# Patient Record
Sex: FEMALE | Race: WHITE | NOT HISPANIC OR LATINO | Employment: OTHER | URBAN - METROPOLITAN AREA
[De-identification: names, ages, dates, MRNs, and addresses within clinical notes are randomized per-mention and may not be internally consistent; named-entity substitution may affect disease eponyms.]

---

## 2023-10-09 ENCOUNTER — TELEPHONE (OUTPATIENT)
Dept: UROLOGY | Facility: AMBULATORY SURGERY CENTER | Age: 73
End: 2023-10-09

## 2023-10-09 NOTE — TELEPHONE ENCOUNTER
Complaint/Diagnosis:Recurrent UTI    Insurance:[facility] #[id number] [employer] [phone] Dayanara [place] spouse    History of cancer:bladder    Previous urologist:Dr.Leonard COLLINS [address] [address] d#[phone]    Outside Testing/where: [facility]    If yes,what kind:All/Any    Records requested/where:Pt will have transferred/faxed     Preferred location:[place]

## 2023-10-14 ENCOUNTER — APPOINTMENT (EMERGENCY)
Dept: CT IMAGING | Facility: HOSPITAL | Age: 73
End: 2023-10-14
Payer: MEDICARE

## 2023-10-14 ENCOUNTER — HOSPITAL ENCOUNTER (EMERGENCY)
Facility: HOSPITAL | Age: 73
Discharge: HOME | End: 2023-10-14
Attending: STUDENT IN AN ORGANIZED HEALTH CARE EDUCATION/TRAINING PROGRAM
Payer: MEDICARE

## 2023-10-14 ENCOUNTER — APPOINTMENT (EMERGENCY)
Dept: ULTRASOUND IMAGING | Facility: HOSPITAL | Age: 73
End: 2023-10-14
Payer: MEDICARE

## 2023-10-14 VITALS
SYSTOLIC BLOOD PRESSURE: 141 MMHG | HEART RATE: 71 BPM | BODY MASS INDEX: 34.31 KG/M2 | WEIGHT: 201 LBS | OXYGEN SATURATION: 98 % | HEIGHT: 64 IN | DIASTOLIC BLOOD PRESSURE: 87 MMHG | RESPIRATION RATE: 18 BRPM | TEMPERATURE: 97.6 F

## 2023-10-14 DIAGNOSIS — N39.0 UTI (URINARY TRACT INFECTION): Primary | ICD-10-CM

## 2023-10-14 DIAGNOSIS — R93.89 ENDOMETRIAL THICKENING ON ULTRASOUND: ICD-10-CM

## 2023-10-14 LAB
ALBUMIN SERPL BCP-MCNC: 4.4 G/DL (ref 3.5–5)
ALP SERPL-CCNC: 66 U/L (ref 34–104)
ALT SERPL W P-5'-P-CCNC: 15 U/L (ref 7–52)
ANION GAP SERPL CALCULATED.3IONS-SCNC: 8 MMOL/L
AST SERPL W P-5'-P-CCNC: 22 U/L (ref 13–39)
BACTERIA UR QL AUTO: ABNORMAL /HPF
BASOPHILS # BLD AUTO: 0.07 THOUSANDS/ÂΜL (ref 0–0.1)
BASOPHILS NFR BLD AUTO: 1 % (ref 0–1)
BILIRUB SERPL-MCNC: 0.83 MG/DL (ref 0.2–1)
BILIRUB UR QL STRIP: ABNORMAL
BUN SERPL-MCNC: 19 MG/DL (ref 5–25)
CALCIUM SERPL-MCNC: 9.2 MG/DL (ref 8.4–10.2)
CHLORIDE SERPL-SCNC: 102 MMOL/L (ref 96–108)
CLARITY UR: ABNORMAL
CO2 SERPL-SCNC: 27 MMOL/L (ref 21–32)
COLOR UR: YELLOW
CREAT SERPL-MCNC: 1.01 MG/DL (ref 0.6–1.3)
EOSINOPHIL # BLD AUTO: 0.11 THOUSAND/ÂΜL (ref 0–0.61)
EOSINOPHIL NFR BLD AUTO: 1 % (ref 0–6)
ERYTHROCYTE [DISTWIDTH] IN BLOOD BY AUTOMATED COUNT: 13.4 % (ref 11.6–15.1)
GFR SERPL CREATININE-BSD FRML MDRD: 56 ML/MIN/1.73SQ M
GLUCOSE SERPL-MCNC: 100 MG/DL (ref 65–140)
GLUCOSE UR STRIP-MCNC: NEGATIVE MG/DL
HCT VFR BLD AUTO: 43.8 % (ref 34.8–46.1)
HGB BLD-MCNC: 13.9 G/DL (ref 11.5–15.4)
HGB UR QL STRIP.AUTO: ABNORMAL
IMM GRANULOCYTES # BLD AUTO: 0.04 THOUSAND/UL (ref 0–0.2)
IMM GRANULOCYTES NFR BLD AUTO: 0 % (ref 0–2)
KETONES UR STRIP-MCNC: NEGATIVE MG/DL
LACTATE SERPL-SCNC: 1.5 MMOL/L (ref 0.5–2)
LEUKOCYTE ESTERASE UR QL STRIP: ABNORMAL
LYMPHOCYTES # BLD AUTO: 1.84 THOUSANDS/ÂΜL (ref 0.6–4.47)
LYMPHOCYTES NFR BLD AUTO: 21 % (ref 14–44)
MCH RBC QN AUTO: 32.3 PG (ref 26.8–34.3)
MCHC RBC AUTO-ENTMCNC: 31.7 G/DL (ref 31.4–37.4)
MCV RBC AUTO: 102 FL (ref 82–98)
MONOCYTES # BLD AUTO: 0.65 THOUSAND/ÂΜL (ref 0.17–1.22)
MONOCYTES NFR BLD AUTO: 7 % (ref 4–12)
NEUTROPHILS # BLD AUTO: 6.23 THOUSANDS/ÂΜL (ref 1.85–7.62)
NEUTS SEG NFR BLD AUTO: 70 % (ref 43–75)
NITRITE UR QL STRIP: NEGATIVE
NON-SQ EPI CELLS URNS QL MICRO: ABNORMAL /HPF
NRBC BLD AUTO-RTO: 0 /100 WBCS
PH UR STRIP.AUTO: 5.5 [PH]
PLATELET # BLD AUTO: 227 THOUSANDS/UL (ref 149–390)
PMV BLD AUTO: 8.7 FL (ref 8.9–12.7)
POTASSIUM SERPL-SCNC: 4.8 MMOL/L (ref 3.5–5.3)
PROT SERPL-MCNC: 7 G/DL (ref 6.4–8.4)
PROT UR STRIP-MCNC: ABNORMAL MG/DL
RBC # BLD AUTO: 4.3 MILLION/UL (ref 3.81–5.12)
RBC #/AREA URNS AUTO: ABNORMAL /HPF
SODIUM SERPL-SCNC: 137 MMOL/L (ref 135–147)
SP GR UR STRIP.AUTO: >=1.03 (ref 1–1.03)
UROBILINOGEN UR QL STRIP.AUTO: 0.2 E.U./DL
WBC # BLD AUTO: 8.94 THOUSAND/UL (ref 4.31–10.16)
WBC #/AREA URNS AUTO: ABNORMAL /HPF

## 2023-10-14 PROCEDURE — 99283 EMERGENCY DEPT VISIT LOW MDM: CPT

## 2023-10-14 PROCEDURE — 96374 THER/PROPH/DIAG INJ IV PUSH: CPT

## 2023-10-14 PROCEDURE — 87086 URINE CULTURE/COLONY COUNT: CPT | Performed by: PHYSICIAN ASSISTANT

## 2023-10-14 PROCEDURE — 83605 ASSAY OF LACTIC ACID: CPT | Performed by: PHYSICIAN ASSISTANT

## 2023-10-14 PROCEDURE — 74176 CT ABD & PELVIS W/O CONTRAST: CPT

## 2023-10-14 PROCEDURE — 76856 US EXAM PELVIC COMPLETE: CPT

## 2023-10-14 PROCEDURE — 85025 COMPLETE CBC W/AUTO DIFF WBC: CPT | Performed by: PHYSICIAN ASSISTANT

## 2023-10-14 PROCEDURE — 36415 COLL VENOUS BLD VENIPUNCTURE: CPT | Performed by: PHYSICIAN ASSISTANT

## 2023-10-14 PROCEDURE — 80053 COMPREHEN METABOLIC PANEL: CPT | Performed by: PHYSICIAN ASSISTANT

## 2023-10-14 PROCEDURE — 76830 TRANSVAGINAL US NON-OB: CPT

## 2023-10-14 PROCEDURE — 99285 EMERGENCY DEPT VISIT HI MDM: CPT | Performed by: PHYSICIAN ASSISTANT

## 2023-10-14 PROCEDURE — G1004 CDSM NDSC: HCPCS

## 2023-10-14 PROCEDURE — 81001 URINALYSIS AUTO W/SCOPE: CPT | Performed by: PHYSICIAN ASSISTANT

## 2023-10-14 RX ORDER — CIPROFLOXACIN 500 MG/1
500 TABLET, FILM COATED ORAL ONCE
Status: COMPLETED | OUTPATIENT
Start: 2023-10-14 | End: 2023-10-14

## 2023-10-14 RX ORDER — PHENAZOPYRIDINE HYDROCHLORIDE 100 MG/1
100 TABLET, FILM COATED ORAL ONCE
Status: COMPLETED | OUTPATIENT
Start: 2023-10-14 | End: 2023-10-14

## 2023-10-14 RX ORDER — CIPROFLOXACIN 500 MG/1
500 TABLET, FILM COATED ORAL 2 TIMES DAILY
Qty: 14 TABLET | Refills: 0 | Status: SHIPPED | OUTPATIENT
Start: 2023-10-14 | End: 2023-10-14 | Stop reason: SDUPTHER

## 2023-10-14 RX ORDER — CIPROFLOXACIN 500 MG/1
500 TABLET, FILM COATED ORAL 2 TIMES DAILY
Qty: 14 TABLET | Refills: 0 | Status: SHIPPED | OUTPATIENT
Start: 2023-10-14 | End: 2023-10-21

## 2023-10-14 RX ORDER — KETOROLAC TROMETHAMINE 30 MG/ML
15 INJECTION, SOLUTION INTRAMUSCULAR; INTRAVENOUS ONCE
Status: COMPLETED | OUTPATIENT
Start: 2023-10-14 | End: 2023-10-14

## 2023-10-14 RX ADMIN — PHENAZOPYRIDINE 100 MG: 100 TABLET ORAL at 12:27

## 2023-10-14 RX ADMIN — CIPROFLOXACIN HYDROCHLORIDE 500 MG: 500 TABLET, FILM COATED ORAL at 15:58

## 2023-10-14 RX ADMIN — KETOROLAC TROMETHAMINE 15 MG: 30 INJECTION, SOLUTION INTRAMUSCULAR at 13:40

## 2023-10-14 NOTE — DISCHARGE INSTRUCTIONS
These follow-up with gynecology for the ultrasound results which were concerning for endometrial thickening and possibly endometrial cancer. Please take new antibiotic as prescribed and follow-up with our urology department, a referral has been placed for you. Please return with any new or worsening symptoms  US pelvis complete w transvaginal   Final Result      Markedly irregular thickened endometrium likely representing endometrial carcinoma. Workstation performed: [id number]         CT renal stone study abdomen pelvis wo contrast   Final Result      No nephrolithiasis or hydronephroureter. Imaging findings concerning for acute cystitis. Apparent 4.3 cm low-attenuation lesion replacing the uterine body, which can represent endometrial or uterine neoplasm. Pelvic ultrasound is recommended for further assessment. Right basilar pulmonary groundglass attenuation, which can represent atelectasis or less likely, consolidation. Clinical correlation is recommended.          I personally discussed this study with Guillaume Baptiste on 10/14/2023 2:19 PM.                              Workstation performed: [id number]

## 2023-10-14 NOTE — ED PROVIDER NOTES
History  Chief Complaint   Patient presents with    Possible UTI     Patient reports UTI "forever". States she has been following with urology for over a year for recurrent UTIs. Discontinued antibiotics today. Sent to ED due to blood in urine. 72year old female presents to the ED for evaluation of UTI symptoms. Follows with Dr. Jorge See from [facility] due to recurrent UTI for the past year. States she typically on abx for about 10 days and symptoms clear and then in a couple weeks later symptoms return. Reports she is unhappy with care and has upcoming apt with Dr. Lisandro Holcomb in November. Patient also states she saw OBGYN yesterday for new patient visit. Currently on Keflex started on Tuesday without improvement of symptoms. This was prescribed by PCP who recommended stopping it today as she had had no improvement. She reports pressure, urge incontence, bilateral flank pain, dysuria. Reports microscopic hematuria but no grooss hematuria. Denies hx of kidney stones. No fevers or chills. Reports history of bladder cancer. None       Past Medical History:   Diagnosis Date    Recurrent UTI        History reviewed. No pertinent surgical history. History reviewed. No pertinent family history. I have reviewed and agree with the history as documented. E-Cigarette/Vaping    E-Cigarette Use Never User      E-Cigarette/Vaping Substances     Social History     Tobacco Use    Smoking status: Every Day     Packs/day: 1.00     Types: Cigarettes    Smokeless tobacco: Never   Vaping Use    Vaping Use: Never used   Substance Use Topics    Alcohol use: Not Currently       Review of Systems   Constitutional:  Negative for appetite change, fatigue and fever. Respiratory: Negative. Cardiovascular: Negative. Gastrointestinal:  Negative for abdominal pain. Genitourinary:  Positive for dysuria, frequency and urgency. Urge incontinence   Musculoskeletal:  Positive for back pain. Skin: Negative. Neurological: Negative. All other systems reviewed and are negative. Physical Exam  Physical Exam  Vitals and nursing note reviewed. Constitutional:       General: She is not in acute distress. Appearance: Normal appearance. She is not ill-appearing, toxic-appearing or diaphoretic. HENT:      Head: Normocephalic. Nose: Nose normal.   Eyes:      Conjunctiva/sclera: Conjunctivae normal.   Cardiovascular:      Rate and Rhythm: Normal rate and regular rhythm. Pulmonary:      Effort: Pulmonary effort is normal.      Breath sounds: Normal breath sounds. No stridor. No wheezing, rhonchi or rales. Abdominal:      General: Abdomen is flat. Bowel sounds are normal. There is no distension. Palpations: Abdomen is soft. Tenderness: There is no abdominal tenderness. There is no guarding. Musculoskeletal:         General: Normal range of motion. Skin:     General: Skin is warm and dry. Capillary Refill: Capillary refill takes less than 2 seconds. Findings: No erythema or rash. Neurological:      General: No focal deficit present. Mental Status: She is alert.    Psychiatric:         Mood and Affect: Mood normal.         Vital Signs  ED Triage Vitals [10/14/23 1157]   Temperature Pulse Respirations Blood Pressure SpO2   97.6 °F (36.4 °C) 71 18 141/87 98 %      Temp Source Heart Rate Source Patient Position - Orthostatic VS BP Location FiO2 (%)   Temporal Monitor Sitting Left arm --      Pain Score       4           Vitals:    10/14/23 1157   BP: 141/87   Pulse: 71   Patient Position - Orthostatic VS: Sitting         Visual Acuity      ED Medications  Medications   phenazopyridine (PYRIDIUM) tablet 100 mg (100 mg Oral Given 10/14/23 1227)   ketorolac (TORADOL) injection 15 mg (15 mg Intravenous Given 10/14/23 1340)   ciprofloxacin (CIPRO) tablet 500 mg (500 mg Oral Given 10/14/23 1558)       Diagnostic Studies  Results Reviewed       Procedure Component Value Units Date/Time Comprehensive metabolic panel [[id number]] Collected: 10/14/23 1227    Lab Status: Final result Specimen: Blood from Arm, Left Updated: 10/14/23 1310     Sodium 137 mmol/L      Potassium 4.8 mmol/L      Chloride 102 mmol/L      CO2 27 mmol/L      ANION GAP 8 mmol/L      BUN 19 mg/dL      Creatinine 1.01 mg/dL      Glucose 100 mg/dL      Calcium 9.2 mg/dL      AST 22 U/L      ALT 15 U/L      Alkaline Phosphatase 66 U/L      Total Protein 7.0 g/dL      Albumin 4.4 g/dL      Total Bilirubin 0.83 mg/dL      eGFR 56 ml/min/1.73sq m     Narrative:      [place] guidelines for Chronic Kidney Disease (CKD):     Stage 1 with normal or high GFR (GFR > 90 mL/min/1.73 square meters)    Stage 2 Mild CKD (GFR = 60-89 mL/min/1.73 square meters)    Stage 3A Moderate CKD (GFR = 45-59 mL/min/1.73 square meters)    Stage 3B Moderate CKD (GFR = 30-44 mL/min/1.73 square meters)    Stage 4 Severe CKD (GFR = 15-29 mL/min/1.73 square meters)    Stage 5 End Stage CKD (GFR <15 mL/min/1.73 square meters)  Note: GFR calculation is accurate only with a steady state creatinine    Lactic acid, plasma (w/reflex if result > 2.0) [[id number]]  (Normal) Collected: 10/14/23 1227    Lab Status: Final result Specimen: Blood from Arm, Left Updated: 10/14/23 1310     LACTIC ACID 1.5 mmol/L     Narrative:      Result may be elevated if tourniquet was used during collection. Urine Microscopic [[id number]]  (Abnormal) Collected: 10/14/23 1226    Lab Status: Final result Specimen: Urine, Clean Catch Updated: 10/14/23 1302     RBC, UA 4-10 /hpf      WBC, UA Innumerable /hpf      Epithelial Cells Moderate /hpf      Bacteria, UA Innumerable /hpf     Urine culture [[id number]] Collected: 10/14/23 1226    Lab Status:  In process Specimen: Urine, Clean Catch Updated: 10/14/23 1302    UA w Reflex to Microscopic w Reflex to Culture [[id number]]  (Abnormal) Collected: 10/14/23 1226    Lab Status: Final result Specimen: Urine, Clean Catch Updated: 10/14/23 1249     Color, UA Yellow     Clarity, UA Cloudy     Specific Gravity, UA >=1.030     pH, UA 5.5     Leukocytes, UA Large     Nitrite, UA Negative     Protein,  (2+) mg/dl      Glucose, UA Negative mg/dl      Ketones, UA Negative mg/dl      Urobilinogen, UA 0.2 E.U./dl      Bilirubin, UA Small     Occult Blood, UA Moderate    CBC and differential [[id number]]  (Abnormal) Collected: 10/14/23 1227    Lab Status: Final result Specimen: Blood from Arm, Left Updated: 10/14/23 1246     WBC 8.94 Thousand/uL      RBC 4.30 Million/uL      Hemoglobin 13.9 g/dL      Hematocrit 43.8 %       fL      MCH 32.3 pg      MCHC 31.7 g/dL      RDW 13.4 %      MPV 8.7 fL      Platelets 287 Thousands/uL      nRBC 0 /100 WBCs      Neutrophils Relative 70 %      Immat GRANS % 0 %      Lymphocytes Relative 21 %      Monocytes Relative 7 %      Eosinophils Relative 1 %      Basophils Relative 1 %      Neutrophils Absolute 6.23 Thousands/µL      Immature Grans Absolute 0.04 Thousand/uL      Lymphocytes Absolute 1.84 Thousands/µL      Monocytes Absolute 0.65 Thousand/µL      Eosinophils Absolute 0.11 Thousand/µL      Basophils Absolute 0.07 Thousands/µL                    US pelvis complete w transvaginal   Final Result by Lazara Le MD (10/14 1331)      Markedly irregular thickened endometrium likely representing endometrial carcinoma. Workstation performed: [id number]         CT renal stone study abdomen pelvis wo contrast   Final Result by Marlene Reynoso MD (10/14 1421)      No nephrolithiasis or hydronephroureter. Imaging findings concerning for acute cystitis. Apparent 4.3 cm low-attenuation lesion replacing the uterine body, which can represent endometrial or uterine neoplasm. Pelvic ultrasound is recommended for further assessment. Right basilar pulmonary groundglass attenuation, which can represent atelectasis or less likely, consolidation.  Clinical correlation is recommended. I personally discussed this study with Frankiliamindy Hardy on 10/14/2023 2:19 PM.                              Workstation performed: [id number]                    Procedures  Procedures         ED Course  ED Course as of 10/14/23 1847   Sat Oct 14, 2023   1259 WBC: 8.94   1259 Leukocytes, UA(!): Large   1259 Blood, UA(!): Moderate   1303 WBC, UA(!): Innumerable   1303 UA consistent with UTI   1318 Creatinine: 1.01   1319 LACTIC ACID: 1.5   1432 CT renal: No nephrolithiasis or hydronephroureter. Imaging findings concerning for acute cystitis. Apparent 4.3 cm low-attenuation lesion replacing the uterine body, which can represent endometrial or uterine neoplasm. Pelvic ultrasound is recommended for further assessment. Right basilar pulmonary groundglass attenuation, which can represent atelectasis or less likely, consolidation. Clinical correlation is recommended. 1435 I discussed results and findings with the patient thus far. Patient resting comfortably. Agreeable to pelvic ultrasound. [address]: Markedly irregular thickened endometrium likely representing endometrial carcinoma. 1555 In-depth discussion regarding results and findings with the patient and spouse. She was provided with copy of test results. She will follow-up with her gynecologist whom she saw yesterday. She will also follow-up with our urology department. Discussed return precautions and follow-up and patient verbalized understanding. She was clinically and hemodynamically stable for discharge                               SBIRT 22yo+      Flowsheet Row Most Recent Value   Initial Alcohol Screen: US AUDIT-C     1. How often do you have a drink containing alcohol? 0 Filed at: 10/14/2023 1202   2. How many drinks containing alcohol do you have on a typical day you are drinking? 0 Filed at: 10/14/2023 1202   3b. FEMALE Any Age, or MALE 65+:  How often do you have 4 or more drinks on one occassion? 0 Filed at: 10/14/2023 1202   Audit-C Score 0 Filed at: 10/14/2023 1202   COURTNEY: How many times in the past year have you. .. Used an illegal drug or used a prescription medication for non-medical reasons? Never Filed at: 10/14/2023 1202                      Medical Decision Making  82-year-old female presented to the emergency department for evaluation of UTI symptoms. Vitals and medical record reviewed. Patient at risk for the following but not limited to: UTI, pyelonephritis, renal stone. Patient was found to have UTI, CAT scan was consistent with cystitis also noted thickened endometrium and recommended dedicated ultrasound. Ultrasound concerning for irregularity in the endometrium likely for endometrial carcinoma. Discussed this with the patient. She will follow-up with her gynecologist.  Also have patient follow-up with urology. She was started on Cipro. We discussed tricked return precautions and patient verbalized understanding. She was clinically and hemodynamically stable for discharge    Amount and/or Complexity of Data Reviewed  Labs: ordered. Decision-making details documented in ED Course. Radiology: ordered. Risk  Prescription drug management. Disposition  Final diagnoses:   UTI (urinary tract infection)   Endometrial thickening on ultrasound     Time reflects when diagnosis was documented in both MDM as applicable and the Disposition within this note       Time User Action Codes Description Comment    10/14/2023  3:55 PM [email or web address] Purchase Add [N39.0] UTI (urinary tract infection)     10/14/2023  3:57 PM Sigmund Purchase Add [R93.89] Endometrial thickening on ultrasound           ED Disposition       ED Disposition   Discharge    Condition   Stable    Date/Time   Sat Oct 14, 2023 [address] discharge to home/self care.                    Follow-up Information       Follow up With Specialties Details Why [address], DO Internal Medicine [place] [id number]  [phone]              Discharge Medication List as of 10/14/2023  3:57 PM        START taking these medications    Details   ciprofloxacin (CIPRO) 500 mg tablet Take 1 tablet (500 mg total) by mouth 2 (two) times a day for 7 days, Starting Sat 10/14/2023, Until Sat 10/21/2023, Normal                 PDMP Review       None            ED Provider  Electronically Signed by             Dallin Cohn PA-C  10/14/23 9130

## 2023-10-15 LAB — BACTERIA UR CULT: NORMAL

## 2023-10-16 NOTE — TELEPHONE ENCOUNTER
Spoke with Dr. Sofie Justice and patient can be seen sooner then 11/1/23 due to being seen in the ER. She should be seen after her antibiotics are finished on 10/21/23. Please see if there is a sooner appointment with our providers in the office.

## 2023-10-16 NOTE — TELEPHONE ENCOUNTER
Npt called stating she presented [facility] ED 10/14/23 please review records/results for sooner than previously scheduled 11/01/23 appointment and contact her directly.

## 2023-10-23 RX ORDER — METOPROLOL SUCCINATE 25 MG/1
1 TABLET, EXTENDED RELEASE ORAL DAILY
COMMUNITY
Start: 2023-08-14

## 2023-10-23 RX ORDER — TELMISARTAN AND HYDROCHLORTHIAZIDE 80; 25 MG/1; MG/1
1 TABLET ORAL DAILY
COMMUNITY

## 2023-10-23 RX ORDER — OMEPRAZOLE 20 MG/1
20 CAPSULE, DELAYED RELEASE ORAL DAILY
COMMUNITY
Start: 2023-08-15

## 2023-10-23 RX ORDER — POTASSIUM CHLORIDE 750 MG/1
10 TABLET, EXTENDED RELEASE ORAL DAILY
COMMUNITY

## 2023-10-23 RX ORDER — SULFAMETHOXAZOLE AND TRIMETHOPRIM 800; 160 MG/1; MG/1
1 TABLET ORAL 2 TIMES DAILY
COMMUNITY

## 2023-10-23 RX ORDER — OMEPRAZOLE 20 MG/1
20 TABLET, DELAYED RELEASE ORAL DAILY
COMMUNITY

## 2023-10-23 RX ORDER — ROSUVASTATIN CALCIUM 5 MG/1
5 TABLET, COATED ORAL DAILY
COMMUNITY
Start: 2023-05-15

## 2023-10-23 RX ORDER — PRAMIPEXOLE DIHYDROCHLORIDE 0.5 MG/1
1 TABLET ORAL 2 TIMES DAILY
COMMUNITY
Start: 2023-08-21

## 2023-10-23 RX ORDER — SERTRALINE HYDROCHLORIDE 100 MG/1
100 TABLET, FILM COATED ORAL DAILY
COMMUNITY

## 2023-10-23 NOTE — PROGRESS NOTES
UROLOGY PROGRESS NOTE         NAME: Cayla Terrazas  AGE: 72 y.o. SEX: female  : 1950   MRN: [id number]    DATE: 10/25/2023  TIME: 10:34 AM    Assessment and Plan     Bladder catheterization    Date/Time: 10/25/2023 10:00 AM    Performed by: Aicha Puga MD  Authorized by: Aicha Puga MD    Comments:      Patient was placed in dorsal thumb position and prepped draped usual sterile fashion. Patient was straight cathed for a volume of 10 cc. Grossly clear.  Impression:   1. Urinary frequency  -     POCT urine dip auto non-scope    2. Urinary urgency    3. Dysuria    4. UTI (urinary tract infection)  -     Ambulatory Referral to Urology  -     POCT urine dip auto non-scope    5. History of bladder cancer         Plan: Our plan set patient up for cystoscopy possible bladder biopsy bilateral retrograde pyelograms. 4 weeks of Gemtesa to see if it settles her overactive bladder symptoms. Send her urine for culture and cytology and treat if positive. She understands and agrees  Also going to get a prescription for Pyridium for the burning. Chief Complaint   No chief complaint on file. History of Present Illness     HPI: Cayla Terrazas is a 72y.o. year old female who presents with evaluation of recurrent UTIs. Patient was last seen by [facility] urology  On 2023 Dr. Farrah Toro. According to his note she had underwent a cystoscopy revealing erythemic changes but no other abnormality. She was treated with a 10-day course of Cipro. Urine culture grew out Serratia sensitive to Cipro. She was then supposed to start low-dose antibiotic Bactrim DS 1/2 tablet daily but she forgot to take it according to 's note. He was to begin on 2023 a cephalosporin 200 mg twice a day for 10 days pending cultures and then taking the Bactrim DS half tablet daily possibly indefinitely or for 6 months to 1 year the plan was to recheck her urine in 3 months.     Patient was in the ER 10/14/2023 CT stone protocol that showed normal upper tracts no hydro no stones. The bladder was thick-walled suggestive of acute cystitis. She also had an abnormal CT showing thickening endometrium and the ultrasound color irregular likely representing a uterine carcinoma. Which of course will require gynecological follow-up. Urine culture 10/14/2023 no growth. Ever she had lots of pyuria. Please note in the ER she was complaining of pressure urge incontinence flank pain dysuria. ? There was a history of bladder cancer. Patient states she wants to transfer care because she is having lots of dysuria urgency frequency urge incontinence. He states Dr. Judith Hooper diagnosed her with bladder cancer in 2017 she received BCG. My concern was she had a recent cystoscopy at [facility] and it was noted in the report that she had erythema but a biopsy was not recommended.       The following portions of the patient's history were reviewed and updated as appropriate: allergies, current medications, past family history, past medical history, past social history, past surgical history and problem list.  Past Medical History:   Diagnosis Date    Acute UTI 2014    Arthritis 2016    Bladder cancer ([address])     COPD (chronic obstructive pulmonary disease) ([address]) 2016    Dependence on nicotine from cigarettes 2016    Depression 2016    Dupuytren's disease of palm 2018    Dyspnea on exertion     GERD (gastroesophageal reflux disease)     Heart murmur     Hyperlipidemia     Hypertension     Hypokalemia 2016    Left ventricular hypertrophy     Mixed stress and urge urinary incontinence     Non-rheumatic mitral regurgitation 2023    Osteoarthritis 2016    left knee    Osteopenia     Panlobular emphysema ([address])     Recurrent UTI     RLS (restless legs syndrome) 2016    Sleep apnea     Sleep apnea 2023     Past Surgical History:   Procedure Laterality Date    CATARACT EXTRACTION Bilateral     CYSTOSCOPY      ILEOSTOMY      REPLACEMENT TOTAL KNEE BILATERAL TONSILLECTOMY       shoulder  Review of Systems     Const: Denies chills, fever and weight loss. CV: Denies chest pain. Resp: Denies SOB. GI: Denies abdominal pain, nausea and vomiting. : Denies symptoms other than stated above. Musculo: Denies back pain. Objective   /90 (BP Location: Left arm, Patient Position: Sitting, Cuff Size: Standard)   Pulse 81   Temp (!) 97.2 °F (36.2 °C)   Ht 5' 4" (1.626 m)   Wt 92.1 kg (203 lb)   SpO2 94%   BMI 34.84 kg/m²     Physical Exam  Const: Appears healthy and well developed. No signs of acute distress present. Resp: Respirations are regular and unlabored. CV: Rate is regular. Rhythm is regular. Abdomen: Abdomen is soft, nontender, and nondistended. Kidneys are not palpable. : She had a normal external genitalia exam.  Psych: Patient's attitude is cooperative.  Mood is normal. Affect is normal.    Current Medications     Current Outpatient Medications:     aspirin (ECOTRIN LOW STRENGTH) 81 mg EC tablet, Take 81 mg by mouth daily, Disp: , Rfl:     celecoxib (CeleBREX) 200 mg capsule, Take 1 capsule by mouth daily, Disp: , Rfl:     Fluticasone-Umeclidin-Vilant (TRELEGY ELLIPTA IN), Inhale, Disp: , Rfl:     Fructooligosaccharides (FOS PO), Take 1,600 mg by mouth, Disp: , Rfl:     metoprolol succinate (TOPROL-XL) 25 mg 24 hr tablet, Take 1 tablet by mouth daily, Disp: , Rfl:     omeprazole (PriLOSEC OTC) 20 MG tablet, Take 20 mg by mouth daily, Disp: , Rfl:     potassium chloride (K-DUR,KLOR-CON) 10 mEq tablet, Take 10 mEq by mouth daily, Disp: , Rfl:     pramipexole (MIRAPEX) 0.5 mg tablet, Take 1 tablet by mouth 2 (two) times a day, Disp: , Rfl:     rosuvastatin (CRESTOR) 5 mg tablet, Take 5 mg by mouth daily, Disp: , Rfl:     sertraline (ZOLOFT) 100 mg tablet, Take 100 mg by mouth daily, Disp: , Rfl:     telmisartan-hydrochlorothiazide (MICARDIS HCT) 80-25 MG per tablet, Take 1 tablet by mouth daily, Disp: , Rfl:     Souleymane Ellipta 200-62.5-25 MCG/ACT AEPB inhaler, Inhale 1 puff daily, Disp: , Rfl:     cefpodoxime (VANTIN) 200 mg tablet, Take 200 mg by mouth, Disp: , Rfl:     ciprofloxacin (CIPRO) 500 mg tablet, TAKE 1 TABLET BY MOUTH TWICE A DAY FOR 7 DAYS (Patient not taking: Reported on 10/25/2023), Disp: , Rfl:     CRANBERRY PO, Take 1,600 mg by mouth (Patient not taking: Reported on 10/25/2023), Disp: , Rfl:     ketorolac (ACULAR) 0.5 % ophthalmic solution, Start 3 days preop INSTILL ONE DROP INTO OPERATIVE EYE(S) FOUR TIMES DAILY. CONTINUE POSTOP FOR 4 WEEKS (Patient not taking: Reported on 10/25/2023), Disp: , Rfl:     omeprazole (PriLOSEC) 20 mg delayed release capsule, Take 20 mg by mouth daily (Patient not taking: Reported on 10/25/2023), Disp: , Rfl:     phenazopyridine (PYRIDIUM) 200 mg tablet, TAKE ONE TABLET BY MOUTH THREE TIMES DAILY AS NEEDED FOR BLADDER SPASMS.  (Patient not taking: Reported on 10/25/2023), Disp: , Rfl:     sulfamethoxazole-trimethoprim (BACTRIM DS) 800-160 mg per tablet, Take 1 tablet by mouth 2 (two) times a day (Patient not taking: Reported on 10/25/2023), Disp: , Rfl:         Jaycee Chavarria MD

## 2023-10-23 NOTE — H&P (VIEW-ONLY)
UROLOGY PROGRESS NOTE         NAME: Mary Greco  AGE: 72 y.o. SEX: female  : 1950   MRN: [id number]    DATE: 10/25/2023  TIME: 10:34 AM    Assessment and Plan     Bladder catheterization    Date/Time: 10/25/2023 10:00 AM    Performed by: Benoit Delacruz MD  Authorized by: Benoit Delacruz MD    Comments:      Patient was placed in dorsal thumb position and prepped draped usual sterile fashion. Patient was straight cathed for a volume of 10 cc. Grossly clear.  Impression:   1. Urinary frequency  -     POCT urine dip auto non-scope    2. Urinary urgency    3. Dysuria    4. UTI (urinary tract infection)  -     Ambulatory Referral to Urology  -     POCT urine dip auto non-scope    5. History of bladder cancer         Plan: Our plan set patient up for cystoscopy possible bladder biopsy bilateral retrograde pyelograms. 4 weeks of Gemtesa to see if it settles her overactive bladder symptoms. Send her urine for culture and cytology and treat if positive. She understands and agrees  Also going to get a prescription for Pyridium for the burning. Chief Complaint   No chief complaint on file. History of Present Illness     HPI: Mary Greco is a 72y.o. year old female who presents with evaluation of recurrent UTIs. Patient was last seen by [facility] urology  On 2023 Dr. Michael Garrett. According to his note she had underwent a cystoscopy revealing erythemic changes but no other abnormality. She was treated with a 10-day course of Cipro. Urine culture grew out Serratia sensitive to Cipro. She was then supposed to start low-dose antibiotic Bactrim DS 1/2 tablet daily but she forgot to take it according to 's note. He was to begin on 2023 a cephalosporin 200 mg twice a day for 10 days pending cultures and then taking the Bactrim DS half tablet daily possibly indefinitely or for 6 months to 1 year the plan was to recheck her urine in 3 months.     Patient was in the ER 10/14/2023 CT stone protocol that showed normal upper tracts no hydro no stones. The bladder was thick-walled suggestive of acute cystitis. She also had an abnormal CT showing thickening endometrium and the ultrasound color irregular likely representing a uterine carcinoma. Which of course will require gynecological follow-up. Urine culture 10/14/2023 no growth. Ever she had lots of pyuria. Please note in the ER she was complaining of pressure urge incontinence flank pain dysuria. ? There was a history of bladder cancer. Patient states she wants to transfer care because she is having lots of dysuria urgency frequency urge incontinence. He states Dr. Tomi Walsh diagnosed her with bladder cancer in 2017 she received BCG. My concern was she had a recent cystoscopy at [facility] and it was noted in the report that she had erythema but a biopsy was not recommended.       The following portions of the patient's history were reviewed and updated as appropriate: allergies, current medications, past family history, past medical history, past social history, past surgical history and problem list.  Past Medical History:   Diagnosis Date    Acute UTI 2014    Arthritis 2016    Bladder cancer ([address])     COPD (chronic obstructive pulmonary disease) ([address]) 2016    Dependence on nicotine from cigarettes 2016    Depression 2016    Dupuytren's disease of palm 2018    Dyspnea on exertion     GERD (gastroesophageal reflux disease)     Heart murmur     Hyperlipidemia     Hypertension     Hypokalemia 2016    Left ventricular hypertrophy     Mixed stress and urge urinary incontinence     Non-rheumatic mitral regurgitation 2023    Osteoarthritis 2016    left knee    Osteopenia     Panlobular emphysema ([address])     Recurrent UTI     RLS (restless legs syndrome) 2016    Sleep apnea     Sleep apnea 2023     Past Surgical History:   Procedure Laterality Date    CATARACT EXTRACTION Bilateral     CYSTOSCOPY      ILEOSTOMY      REPLACEMENT TOTAL KNEE BILATERAL TONSILLECTOMY       shoulder  Review of Systems     Const: Denies chills, fever and weight loss. CV: Denies chest pain. Resp: Denies SOB. GI: Denies abdominal pain, nausea and vomiting. : Denies symptoms other than stated above. Musculo: Denies back pain. Objective   /90 (BP Location: Left arm, Patient Position: Sitting, Cuff Size: Standard)   Pulse 81   Temp (!) 97.2 °F (36.2 °C)   Ht 5' 4" (1.626 m)   Wt 92.1 kg (203 lb)   SpO2 94%   BMI 34.84 kg/m²     Physical Exam  Const: Appears healthy and well developed. No signs of acute distress present. Resp: Respirations are regular and unlabored. CV: Rate is regular. Rhythm is regular. Abdomen: Abdomen is soft, nontender, and nondistended. Kidneys are not palpable. : She had a normal external genitalia exam.  Psych: Patient's attitude is cooperative.  Mood is normal. Affect is normal.    Current Medications     Current Outpatient Medications:     aspirin (ECOTRIN LOW STRENGTH) 81 mg EC tablet, Take 81 mg by mouth daily, Disp: , Rfl:     celecoxib (CeleBREX) 200 mg capsule, Take 1 capsule by mouth daily, Disp: , Rfl:     Fluticasone-Umeclidin-Vilant (TRELEGY ELLIPTA IN), Inhale, Disp: , Rfl:     Fructooligosaccharides (FOS PO), Take 1,600 mg by mouth, Disp: , Rfl:     metoprolol succinate (TOPROL-XL) 25 mg 24 hr tablet, Take 1 tablet by mouth daily, Disp: , Rfl:     omeprazole (PriLOSEC OTC) 20 MG tablet, Take 20 mg by mouth daily, Disp: , Rfl:     potassium chloride (K-DUR,KLOR-CON) 10 mEq tablet, Take 10 mEq by mouth daily, Disp: , Rfl:     pramipexole (MIRAPEX) 0.5 mg tablet, Take 1 tablet by mouth 2 (two) times a day, Disp: , Rfl:     rosuvastatin (CRESTOR) 5 mg tablet, Take 5 mg by mouth daily, Disp: , Rfl:     sertraline (ZOLOFT) 100 mg tablet, Take 100 mg by mouth daily, Disp: , Rfl:     telmisartan-hydrochlorothiazide (MICARDIS HCT) 80-25 MG per tablet, Take 1 tablet by mouth daily, Disp: , Rfl:     Souleymane Ellipta 200-62.5-25 MCG/ACT AEPB inhaler, Inhale 1 puff daily, Disp: , Rfl:     cefpodoxime (VANTIN) 200 mg tablet, Take 200 mg by mouth, Disp: , Rfl:     ciprofloxacin (CIPRO) 500 mg tablet, TAKE 1 TABLET BY MOUTH TWICE A DAY FOR 7 DAYS (Patient not taking: Reported on 10/25/2023), Disp: , Rfl:     CRANBERRY PO, Take 1,600 mg by mouth (Patient not taking: Reported on 10/25/2023), Disp: , Rfl:     ketorolac (ACULAR) 0.5 % ophthalmic solution, Start 3 days preop INSTILL ONE DROP INTO OPERATIVE EYE(S) FOUR TIMES DAILY. CONTINUE POSTOP FOR 4 WEEKS (Patient not taking: Reported on 10/25/2023), Disp: , Rfl:     omeprazole (PriLOSEC) 20 mg delayed release capsule, Take 20 mg by mouth daily (Patient not taking: Reported on 10/25/2023), Disp: , Rfl:     phenazopyridine (PYRIDIUM) 200 mg tablet, TAKE ONE TABLET BY MOUTH THREE TIMES DAILY AS NEEDED FOR BLADDER SPASMS.  (Patient not taking: Reported on 10/25/2023), Disp: , Rfl:     sulfamethoxazole-trimethoprim (BACTRIM DS) 800-160 mg per tablet, Take 1 tablet by mouth 2 (two) times a day (Patient not taking: Reported on 10/25/2023), Disp: , Rfl:         Benoit Delacruz MD

## 2023-10-25 ENCOUNTER — PREP FOR PROCEDURE (OUTPATIENT)
Dept: UROLOGY | Facility: CLINIC | Age: 73
End: 2023-10-25

## 2023-10-25 ENCOUNTER — OFFICE VISIT (OUTPATIENT)
Dept: UROLOGY | Facility: CLINIC | Age: 73
End: 2023-10-25
Payer: MEDICARE

## 2023-10-25 VITALS
HEART RATE: 81 BPM | HEIGHT: 64 IN | DIASTOLIC BLOOD PRESSURE: 90 MMHG | BODY MASS INDEX: 34.66 KG/M2 | OXYGEN SATURATION: 94 % | TEMPERATURE: 97.2 F | WEIGHT: 203 LBS | SYSTOLIC BLOOD PRESSURE: 158 MMHG

## 2023-10-25 DIAGNOSIS — Z85.51 HISTORY OF BLADDER CANCER: ICD-10-CM

## 2023-10-25 DIAGNOSIS — R30.0 DYSURIA: ICD-10-CM

## 2023-10-25 DIAGNOSIS — Z01.818 ENCOUNTER FOR PREADMISSION TESTING: Primary | ICD-10-CM

## 2023-10-25 DIAGNOSIS — R35.0 URINARY FREQUENCY: Primary | ICD-10-CM

## 2023-10-25 DIAGNOSIS — C67.9 MALIGNANT NEOPLASM OF URINARY BLADDER, UNSPECIFIED SITE (HCC): ICD-10-CM

## 2023-10-25 DIAGNOSIS — R39.15 URINARY URGENCY: ICD-10-CM

## 2023-10-25 DIAGNOSIS — N39.0 UTI (URINARY TRACT INFECTION): ICD-10-CM

## 2023-10-25 LAB
SL AMB  POCT GLUCOSE, UA: ABNORMAL
SL AMB LEUKOCYTE ESTERASE,UA: ABNORMAL
SL AMB POCT BILIRUBIN,UA: ABNORMAL
SL AMB POCT BLOOD,UA: ABNORMAL
SL AMB POCT CLARITY,UA: CLEAR
SL AMB POCT COLOR,UA: YELLOW
SL AMB POCT KETONES,UA: ABNORMAL
SL AMB POCT NITRITE,UA: ABNORMAL
SL AMB POCT PH,UA: 6
SL AMB POCT SPECIFIC GRAVITY,UA: 1.02
SL AMB POCT URINE PROTEIN: ABNORMAL
SL AMB POCT UROBILINOGEN: 0.2

## 2023-10-25 PROCEDURE — 88112 CYTOPATH CELL ENHANCE TECH: CPT | Performed by: PATHOLOGY

## 2023-10-25 PROCEDURE — 81003 URINALYSIS AUTO W/O SCOPE: CPT | Performed by: UROLOGY

## 2023-10-25 PROCEDURE — 87086 URINE CULTURE/COLONY COUNT: CPT | Performed by: UROLOGY

## 2023-10-25 PROCEDURE — 99204 OFFICE O/P NEW MOD 45 MIN: CPT | Performed by: UROLOGY

## 2023-10-25 RX ORDER — CIPROFLOXACIN 500 MG/1
TABLET, FILM COATED ORAL
COMMUNITY
Start: 2023-10-14

## 2023-10-25 RX ORDER — PHENAZOPYRIDINE HYDROCHLORIDE 200 MG/1
TABLET, FILM COATED ORAL
COMMUNITY
Start: 2023-10-10

## 2023-10-25 RX ORDER — PHENAZOPYRIDINE HYDROCHLORIDE 200 MG/1
200 TABLET, FILM COATED ORAL
Qty: 30 TABLET | Refills: 0 | Status: SHIPPED | OUTPATIENT
Start: 2023-10-25

## 2023-10-26 LAB — BACTERIA UR CULT: NORMAL

## 2023-10-27 ENCOUNTER — TELEPHONE (OUTPATIENT)
Dept: UROLOGY | Facility: CLINIC | Age: 73
End: 2023-10-27

## 2023-10-27 PROCEDURE — 88112 CYTOPATH CELL ENHANCE TECH: CPT | Performed by: PATHOLOGY

## 2023-10-28 DIAGNOSIS — N39.0 URINARY TRACT INFECTION WITHOUT HEMATURIA, SITE UNSPECIFIED: Primary | ICD-10-CM

## 2023-10-28 RX ORDER — NITROFURANTOIN 25; 75 MG/1; MG/1
100 CAPSULE ORAL DAILY
Qty: 90 CAPSULE | Refills: 0 | Status: SHIPPED | OUTPATIENT
Start: 2023-10-28

## 2023-10-30 NOTE — TELEPHONE ENCOUNTER
VM left for pt and , asking to call back to let us know the message was received regarding needing to start macrobid.

## 2023-10-30 NOTE — TELEPHONE ENCOUNTER
Patient returning call    Patient informed of message from Dr. Kareen Manrique about needing to start macrobid and keeping appt to look in the bladder.

## 2023-11-03 ENCOUNTER — OFFICE VISIT (OUTPATIENT)
Dept: LAB | Facility: HOSPITAL | Age: 73
End: 2023-11-03
Payer: MEDICARE

## 2023-11-03 DIAGNOSIS — Z01.818 ENCOUNTER FOR PREADMISSION TESTING: ICD-10-CM

## 2023-11-03 LAB
ATRIAL RATE: 79 BPM
P AXIS: 40 DEGREES
PR INTERVAL: 170 MS
QRS AXIS: 38 DEGREES
QRSD INTERVAL: 96 MS
QT INTERVAL: 378 MS
QTC INTERVAL: 433 MS
T WAVE AXIS: 51 DEGREES
VENTRICULAR RATE: 79 BPM

## 2023-11-03 PROCEDURE — 93005 ELECTROCARDIOGRAM TRACING: CPT

## 2023-11-16 NOTE — PRE-PROCEDURE INSTRUCTIONS
Pre-Surgery Instructions:   Medication Instructions    aspirin (ECOTRIN LOW STRENGTH) 81 mg EC tablet Patient stopped it Wednesday    celecoxib (CeleBREX) 200 mg capsule Hold day of surgery. Fluticasone-Umeclidin-Vilant (TRELEGY ELLIPTA IN) Take day of surgery. metoprolol succinate (TOPROL-XL) 25 mg 24 hr tablet Take day of surgery. nitrofurantoin (MACROBID) 100 mg capsule Take day of surgery. omeprazole (PriLOSEC OTC) 20 MG tablet Take day of surgery. phenazopyridine (PYRIDIUM) 200 mg tablet Uses PRN- DO NOT take day of surgery    pramipexole (MIRAPEX) 0.5 mg tablet Take day of surgery. rosuvastatin (CRESTOR) 5 mg tablet Take night before surgery    sertraline (ZOLOFT) 100 mg tablet Take day of surgery. telmisartan-hydrochlorothiazide (MICARDIS HCT) 80-25 MG per tablet Hold day of surgery. Medication instructions for day surgery reviewed. Please use only a sip of water to take your instructed medications. Avoid all over the counter vitamins, supplements and NSAIDS for one week prior to surgery per anesthesia guidelines. Tylenol is ok to take as needed. You will receive a call one business day prior to surgery with an arrival time and hospital directions. If your surgery is scheduled on a Monday, the hospital will be calling you on the Friday prior to your surgery. If you have not heard from anyone by 8pm, please call the hospital supervisor through the hospital  at [phone]. [facility] [phone]). Do not eat or drink anything after midnight the night before your surgery, including candy, mints, lifesavers, or chewing gum. Do not drink alcohol 24hrs before your surgery. Try not to smoke at least 24hrs before your surgery. Follow the pre surgery showering instructions as listed in the [facility] Surgical Experience Booklet” or otherwise provided by your surgeon's office. Do not use a blade to shave the surgical area 1 week before surgery.  It is okay to use a clean electric clippers up to 24 hours before surgery. Do not apply any lotions, creams, including makeup, cologne, deodorant, or perfumes after showering on the day of your surgery. Do not use dry shampoo, hair spray, hair gel, or any type of hair products. No contact lenses, eye make-up, or artificial eyelashes. Remove nail polish, including gel polish, and any artificial, gel, or acrylic nails if possible. Remove all jewelry including rings and body piercing jewelry. Wear causal clothing that is easy to take on and off. Consider your type of surgery. Keep any valuables, jewelry, piercings at home. Please bring any specially ordered equipment (sling, braces) if indicated. Arrange for a responsible person to drive you to and from the hospital on the day of your surgery. Visitor Guidelines discussed. Call the surgeon's office with any new illnesses, exposures, or additional questions prior to surgery. Please reference your [facility] Surgical Experience Booklet” for additional information to prepare for your upcoming surgery.

## 2023-11-17 ENCOUNTER — APPOINTMENT (OUTPATIENT)
Dept: RADIOLOGY | Facility: HOSPITAL | Age: 73
End: 2023-11-17
Payer: MEDICARE

## 2023-11-17 ENCOUNTER — TELEPHONE (OUTPATIENT)
Dept: UROLOGY | Facility: CLINIC | Age: 73
End: 2023-11-17

## 2023-11-17 ENCOUNTER — ANESTHESIA EVENT (OUTPATIENT)
Dept: PERIOP | Facility: HOSPITAL | Age: 73
End: 2023-11-17
Payer: MEDICARE

## 2023-11-17 ENCOUNTER — HOSPITAL ENCOUNTER (OUTPATIENT)
Facility: HOSPITAL | Age: 73
Setting detail: OUTPATIENT SURGERY
Discharge: HOME | End: 2023-11-17
Attending: UROLOGY | Admitting: UROLOGY
Payer: MEDICARE

## 2023-11-17 ENCOUNTER — ANESTHESIA (OUTPATIENT)
Dept: PERIOP | Facility: HOSPITAL | Age: 73
End: 2023-11-17
Payer: MEDICARE

## 2023-11-17 VITALS
OXYGEN SATURATION: 92 % | TEMPERATURE: 97.8 F | RESPIRATION RATE: 23 BRPM | DIASTOLIC BLOOD PRESSURE: 75 MMHG | HEIGHT: 64 IN | BODY MASS INDEX: 34.15 KG/M2 | WEIGHT: 200 LBS | HEART RATE: 70 BPM | SYSTOLIC BLOOD PRESSURE: 153 MMHG

## 2023-11-17 DIAGNOSIS — Z85.51 HISTORY OF BLADDER CANCER: ICD-10-CM

## 2023-11-17 PROBLEM — Z99.89 CPAP (CONTINUOUS POSITIVE AIRWAY PRESSURE) DEPENDENCE: Status: ACTIVE | Noted: 2023-11-17

## 2023-11-17 PROBLEM — E78.5 HYPERLIPIDEMIA: Status: ACTIVE | Noted: 2023-11-17

## 2023-11-17 PROBLEM — I10 HYPERTENSION: Status: ACTIVE | Noted: 2023-11-17

## 2023-11-17 PROBLEM — K21.9 GERD (GASTROESOPHAGEAL REFLUX DISEASE): Status: ACTIVE | Noted: 2023-11-17

## 2023-11-17 PROCEDURE — C1769 GUIDE WIRE: HCPCS | Performed by: UROLOGY

## 2023-11-17 PROCEDURE — 74420 UROGRAPHY RTRGR +-KUB: CPT

## 2023-11-17 PROCEDURE — C1758 CATHETER, URETERAL: HCPCS | Performed by: UROLOGY

## 2023-11-17 PROCEDURE — 87086 URINE CULTURE/COLONY COUNT: CPT | Performed by: UROLOGY

## 2023-11-17 PROCEDURE — 99024 POSTOP FOLLOW-UP VISIT: CPT | Performed by: UROLOGY

## 2023-11-17 PROCEDURE — 52005 CYSTO W/URTRL CATHJ: CPT | Performed by: UROLOGY

## 2023-11-17 RX ORDER — SODIUM CHLORIDE, SODIUM LACTATE, POTASSIUM CHLORIDE, CALCIUM CHLORIDE 600; 310; 30; 20 MG/100ML; MG/100ML; MG/100ML; MG/100ML
125 INJECTION, SOLUTION INTRAVENOUS CONTINUOUS
Status: DISCONTINUED | OUTPATIENT
Start: 2023-11-17 | End: 2023-11-17 | Stop reason: HOSPADM

## 2023-11-17 RX ORDER — CEFAZOLIN SODIUM 2 G/50ML
2000 SOLUTION INTRAVENOUS ONCE
Status: COMPLETED | OUTPATIENT
Start: 2023-11-17 | End: 2023-11-17

## 2023-11-17 RX ORDER — FENTANYL CITRATE/PF 50 MCG/ML
25 SYRINGE (ML) INJECTION
Status: DISCONTINUED | OUTPATIENT
Start: 2023-11-17 | End: 2023-11-17 | Stop reason: HOSPADM

## 2023-11-17 RX ORDER — ALBUTEROL SULFATE 90 UG/1
AEROSOL, METERED RESPIRATORY (INHALATION) AS NEEDED
Status: DISCONTINUED | OUTPATIENT
Start: 2023-11-17 | End: 2023-11-17

## 2023-11-17 RX ORDER — SODIUM CHLORIDE, SODIUM LACTATE, POTASSIUM CHLORIDE, CALCIUM CHLORIDE 600; 310; 30; 20 MG/100ML; MG/100ML; MG/100ML; MG/100ML
INJECTION, SOLUTION INTRAVENOUS CONTINUOUS PRN
Status: DISCONTINUED | OUTPATIENT
Start: 2023-11-17 | End: 2023-11-17

## 2023-11-17 RX ORDER — EPHEDRINE SULFATE 50 MG/ML
INJECTION INTRAVENOUS AS NEEDED
Status: DISCONTINUED | OUTPATIENT
Start: 2023-11-17 | End: 2023-11-17

## 2023-11-17 RX ORDER — PROPOFOL 10 MG/ML
INJECTION, EMULSION INTRAVENOUS AS NEEDED
Status: DISCONTINUED | OUTPATIENT
Start: 2023-11-17 | End: 2023-11-17

## 2023-11-17 RX ORDER — ONDANSETRON 2 MG/ML
4 INJECTION INTRAMUSCULAR; INTRAVENOUS ONCE AS NEEDED
Status: DISCONTINUED | OUTPATIENT
Start: 2023-11-17 | End: 2023-11-17 | Stop reason: HOSPADM

## 2023-11-17 RX ORDER — GLYCOPYRROLATE 0.2 MG/ML
INJECTION INTRAMUSCULAR; INTRAVENOUS AS NEEDED
Status: DISCONTINUED | OUTPATIENT
Start: 2023-11-17 | End: 2023-11-17

## 2023-11-17 RX ORDER — MAGNESIUM HYDROXIDE 1200 MG/15ML
LIQUID ORAL AS NEEDED
Status: DISCONTINUED | OUTPATIENT
Start: 2023-11-17 | End: 2023-11-17 | Stop reason: HOSPADM

## 2023-11-17 RX ORDER — FENTANYL CITRATE 50 UG/ML
INJECTION, SOLUTION INTRAMUSCULAR; INTRAVENOUS AS NEEDED
Status: DISCONTINUED | OUTPATIENT
Start: 2023-11-17 | End: 2023-11-17

## 2023-11-17 RX ORDER — PHENAZOPYRIDINE HYDROCHLORIDE 100 MG/1
200 TABLET, FILM COATED ORAL ONCE
Status: COMPLETED | OUTPATIENT
Start: 2023-11-17 | End: 2023-11-17

## 2023-11-17 RX ORDER — DEXAMETHASONE SODIUM PHOSPHATE 10 MG/ML
INJECTION, SOLUTION INTRAMUSCULAR; INTRAVENOUS AS NEEDED
Status: DISCONTINUED | OUTPATIENT
Start: 2023-11-17 | End: 2023-11-17

## 2023-11-17 RX ORDER — ONDANSETRON 2 MG/ML
INJECTION INTRAMUSCULAR; INTRAVENOUS AS NEEDED
Status: DISCONTINUED | OUTPATIENT
Start: 2023-11-17 | End: 2023-11-17

## 2023-11-17 RX ADMIN — FENTANYL CITRATE 25 MCG: 0.05 INJECTION, SOLUTION INTRAMUSCULAR; INTRAVENOUS at 09:53

## 2023-11-17 RX ADMIN — DEXAMETHASONE SODIUM PHOSPHATE 8 MG: 10 INJECTION, SOLUTION INTRAMUSCULAR; INTRAVENOUS at 09:55

## 2023-11-17 RX ADMIN — EPHEDRINE SULFATE 5 MG: 50 INJECTION, SOLUTION INTRAVENOUS at 09:59

## 2023-11-17 RX ADMIN — FENTANYL CITRATE 25 MCG: 0.05 INJECTION, SOLUTION INTRAMUSCULAR; INTRAVENOUS at 09:43

## 2023-11-17 RX ADMIN — PHENAZOPYRIDINE 200 MG: 100 TABLET ORAL at 11:06

## 2023-11-17 RX ADMIN — SODIUM CHLORIDE, SODIUM LACTATE, POTASSIUM CHLORIDE, AND CALCIUM CHLORIDE: .6; .31; .03; .02 INJECTION, SOLUTION INTRAVENOUS at 09:35

## 2023-11-17 RX ADMIN — CEFAZOLIN SODIUM 2000 MG: 2 SOLUTION INTRAVENOUS at 09:43

## 2023-11-17 RX ADMIN — FENTANYL CITRATE 25 MCG: 0.05 INJECTION, SOLUTION INTRAMUSCULAR; INTRAVENOUS at 09:57

## 2023-11-17 RX ADMIN — PROPOFOL 160 MG: 10 INJECTION, EMULSION INTRAVENOUS at 09:48

## 2023-11-17 RX ADMIN — GLYCOPYRROLATE 0.2 MG: 0.2 INJECTION, SOLUTION INTRAMUSCULAR; INTRAVENOUS at 09:55

## 2023-11-17 RX ADMIN — ONDANSETRON 4 MG: 2 INJECTION INTRAMUSCULAR; INTRAVENOUS at 09:58

## 2023-11-17 RX ADMIN — FENTANYL CITRATE 25 MCG: 0.05 INJECTION, SOLUTION INTRAMUSCULAR; INTRAVENOUS at 10:02

## 2023-11-17 RX ADMIN — ALBUTEROL SULFATE 2 PUFF: 90 AEROSOL, METERED RESPIRATORY (INHALATION) at 09:46

## 2023-11-17 NOTE — ANESTHESIA POSTPROCEDURE EVALUATION
Post-Op Assessment Note    CV Status:  Stable    Pain management: adequate       Mental Status:  Alert and awake   Hydration Status:  Euvolemic   PONV Controlled:  Controlled   Airway Patency:  Patent     Post Op Vitals Reviewed: Yes    No anethesia notable event occurred.     Staff: CRNA               BP   113/79   Temp   97.8   Pulse  92   Resp   18   SpO2   98%

## 2023-11-17 NOTE — OP NOTE
OPERATIVE REPORT  PATIENT NAME: Theo Xavier    :  1950  MRN: [id number]  Pt Location: OW OR ROOM 02    SURGERY DATE: 2023    Surgeon(s) and Role:     * Yehuda Smiley MD - Primary    Preop Diagnosis:  History of bladder cancer [Z85.51]    Post-Op Diagnosis Codes:     * History of bladder cancer [Z85.51]    Procedure(s):  CYSTOSCOPY  bilateral retrograde pyelograms    Specimen(s):  ID Type Source Tests Collected by Time Destination   A :  Urine Urine, Straight Cath URINE CULTURE Yehuda Smiley MD 2023  9:57 AM        Estimated Blood Loss:   Minimal    Drains:  * No LDAs found *    Anesthesia Type:   General    Operative Indications:  History of bladder cancer [Z85.51]      Operative Findings:  Normal anterior and posterior urethra the bladder had some old scarring from her previous resection for bladder cancer at the dome. But there is no suspicious lesion present there was some mild trabeculation. She did have a bladder capacity of only 200 cc. Both retrograde pyelograms showed normal upper tracts. No filling defects no strictures no stones    Complications:   None    Procedure and Technique:  Patient was placed in dorsolithotomy position perineum and external tag were prepped draped you sterile fashion a urine culture was sent and then cystoscopy was carried out with a 30 and 70 degree lens with above finding noted. Retrograde pyelogram was carried out first on the right than the left using a tiger tail catheter with the above finding noted the bladder was emptied patient had procedure well   I was present for the entire procedure.     Patient Disposition:  hemodynamically stable        SIGNATURE: Yehuda Smiley MD  DATE: 2023  TIME: 10:05 AM

## 2023-11-17 NOTE — PROGRESS NOTES
Patient underwent a cystoscopy that showed no evidence of recurrent bladder cancer. Her urine looked clear culture was sent. Her upper tracts with bilateral retrogrades were normal.    The plan was to continue the 90-day course of Macrobid, she was complaining of preoperative burning so I recommended staying on Pyridium and watching her diet and take Prelief. We will see patient back in 4 months to reevaluate and would need a cystoscopy in 1 year for follow-up history of bladder cancer or sooner if she develops gross hematuria. She stopped taking the Gemtesa she said it helped her urgency frequency and did not request a refill.

## 2023-11-17 NOTE — DISCHARGE INSTR - AVS FIRST PAGE
Call the office fever greater than 102 increased bleeding or pain. Report to the ER for chest pain shortness of breath leg swelling or medical concerns. Recommend over-the-counter Prelief which helps neutralize acidic foods that cause burning in the bladder and urethral area. Also try to avoid spicy foods, caffeine products, and alcohol related products as well. Please take the Prelief twice a day. You can find this medication over-the-counter at a pharmacy or on [employer]. Follow-up with Dr. Jhoana Tucker to be arranged in about 4 months after you complete the 90 days of Macrobid. You could also take your Pyridium turns urine orange for some postop burning. May have some blood in the urine for the next day or 2 that should clear up.

## 2023-11-17 NOTE — ANESTHESIA PREPROCEDURE EVALUATION
Procedure:  CYSTOSCOPY with bladder biopsies and bilateral retrograde pyelograms (Bladder)  CYSTOSCOPY WITH RETROGRADE PYELOGRAM (Ureter)    Relevant Problems   CARDIO   (+) Hyperlipidemia   (+) Hypertension      GI/HEPATIC   (+) GERD (gastroesophageal reflux disease)      MUSCULOSKELETAL   (+) Arthritis      NEURO/PSYCH   (+) Depression      PULMONARY   (+) COPD (chronic obstructive pulmonary disease) (HCC)      Genitourinary   (+) Malignant neoplasm of urinary bladder, unspecified site (HCC)      Other   (+) History of bladder cancer     Normal sinus rhythm  Normal ECG  No previous ECGs available  Confirmed by Negro Tamez ([id number]) on 11/3/2023 1:30:00 PM     CAROTID DUPLEX (2020):  Right:   There is mild homogeneous plaquing noted throughout the right carotid artery. Doppler flow velocities are normal in the right carotid artery. There is no   evidence of a hemodynamically significant stenosis in the right internal   carotid artery. Flow in the right vertebral artery is antegrade. Left:   There is mild homogeneous plaquing noted throughout the left carotid artery. Doppler flow velocities are normal in the left carotid artery. There is no   evidence of a hemodynamically significant stenosis in the left internal   carotid artery. Physical Exam    Airway    Mallampati score:  I  TM Distance: <3 FB  Neck ROM: full     Dental   No notable dental hx     Cardiovascular  Rhythm: regular, Rate: normal    Pulmonary   Breath sounds clear to auscultation    Other Findings  post-pubertal. with the CRNA. Discussed and agreed on the Anesthesia Plan with the CRNA. Krishan Lacey

## 2023-11-17 NOTE — INTERVAL H&P NOTE
H&P reviewed. After examining the patient I find no changes in the patients condition since the H&P had been written.     Vitals:    11/17/23 0921   BP: 151/71   Pulse: 71   Resp: 18   Temp: 98.5 °F (36.9 °C)   SpO2: 95%

## 2023-11-17 NOTE — TELEPHONE ENCOUNTER
----- Message from Dewey Hernandez MD sent at 11/17/2023 10:21 AM EST -----  Follow-up in 4 months, for recurrent UTIs, overactive bladder, chronic dysuria

## 2023-11-18 LAB — BACTERIA UR CULT: NORMAL

## 2023-12-29 ENCOUNTER — NURSE TRIAGE (OUTPATIENT)
Age: 73
End: 2023-12-29

## 2023-12-29 DIAGNOSIS — R39.9 UTI SYMPTOMS: Primary | ICD-10-CM

## 2023-12-29 DIAGNOSIS — N39.0 URINARY TRACT INFECTION ASSOCIATED WITH CATHETERIZATION OF URINARY TRACT, UNSPECIFIED INDWELLING URINARY CATHETER TYPE, SEQUELA: Primary | ICD-10-CM

## 2023-12-29 DIAGNOSIS — T83.511S URINARY TRACT INFECTION ASSOCIATED WITH CATHETERIZATION OF URINARY TRACT, UNSPECIFIED INDWELLING URINARY CATHETER TYPE, SEQUELA: Primary | ICD-10-CM

## 2023-12-29 RX ORDER — CIPROFLOXACIN 500 MG/1
500 TABLET, FILM COATED ORAL EVERY 12 HOURS SCHEDULED
Qty: 14 TABLET | Refills: 0 | Status: SHIPPED | OUTPATIENT
Start: 2023-12-29 | End: 2024-01-05

## 2023-12-29 NOTE — TELEPHONE ENCOUNTER
"Pt of Dr. Saab in [place]. Last seen 11/17/23 had cystoscopy bilateral retorgrade pyelograms    Pt reports having incontinence, pressure and pain when she urinates for the past 1.5 weeks. Also some discharge. Denies fever or chills. Pt reports she reached out to GYN and they advised contact the office. Pt thinks she has an UTI. Advised patient I can place orders for urine testing. Pt reports she will go tot PCP office to get urine testing.   Reason for Disposition   Urinating more frequently than usual (i.e., frequency)    Answer Assessment - Initial Assessment Questions  1. SYMPTOM: \"What's the main symptom you're concerned about?\" (e.g., frequency, incontinence)      incontinence  2. ONSET: \"When did the  symptoms  start?\"      1.5 weeks ago  3. PAIN: \"Is there any pain?\" If Yes, ask: \"How bad is it?\" (Scale: 1-10; mild, moderate, severe)      mild  4. CAUSE: \"What do you think is causing the symptoms?\"      UTI  5. OTHER SYMPTOMS: \"Do you have any other symptoms?\" (e.g., fever, flank pain, blood in urine, pain with urination)      Pressure and discharge    Protocols used: Urinary Symptoms-ADULT-[place]    "

## 2023-12-29 NOTE — TELEPHONE ENCOUNTER
I phoned in a prescription for Cipro for a week 500 twice a day if the patient's symptoms do not improve tell her to call the office for an appointment

## 2024-03-01 ENCOUNTER — APPOINTMENT (INPATIENT)
Dept: ULTRASOUND IMAGING | Facility: HOSPITAL | Age: 74
DRG: 682 | End: 2024-03-01
Payer: MEDICARE

## 2024-03-01 ENCOUNTER — APPOINTMENT (EMERGENCY)
Dept: RADIOLOGY | Facility: HOSPITAL | Age: 74
DRG: 682 | End: 2024-03-01
Payer: MEDICARE

## 2024-03-01 ENCOUNTER — HOSPITAL ENCOUNTER (INPATIENT)
Facility: HOSPITAL | Age: 74
LOS: 5 days | Discharge: HOME | DRG: 682 | End: 2024-03-06
Attending: EMERGENCY MEDICINE | Admitting: FAMILY MEDICINE
Payer: MEDICARE

## 2024-03-01 DIAGNOSIS — J18.9 PNEUMONIA OF LEFT LOWER LOBE DUE TO INFECTIOUS ORGANISM: Primary | ICD-10-CM

## 2024-03-01 DIAGNOSIS — R09.02 HYPOXIA: ICD-10-CM

## 2024-03-01 DIAGNOSIS — J84.10 PULMONARY FIBROSIS (HCC): ICD-10-CM

## 2024-03-01 DIAGNOSIS — J44.1 COPD EXACERBATION (HCC): ICD-10-CM

## 2024-03-01 PROBLEM — J96.01 ACUTE RESPIRATORY FAILURE WITH HYPOXIA (HCC): Status: ACTIVE | Noted: 2024-03-01

## 2024-03-01 PROBLEM — N17.9 AKI (ACUTE KIDNEY INJURY) (HCC): Status: ACTIVE | Noted: 2024-03-01

## 2024-03-01 PROBLEM — Z85.51 HISTORY OF BLADDER CANCER: Status: RESOLVED | Noted: 2023-10-25 | Resolved: 2024-03-01

## 2024-03-01 LAB
2HR DELTA HS TROPONIN: -6 NG/L
ALBUMIN SERPL BCP-MCNC: 4.2 G/DL (ref 3.5–5)
ALP SERPL-CCNC: 58 U/L (ref 34–104)
ALT SERPL W P-5'-P-CCNC: 20 U/L (ref 7–52)
ANION GAP SERPL CALCULATED.3IONS-SCNC: 12 MMOL/L
AST SERPL W P-5'-P-CCNC: 28 U/L (ref 13–39)
ATRIAL RATE: 71 BPM
BACTERIA UR QL AUTO: ABNORMAL /HPF
BASE EX.OXY STD BLDV CALC-SCNC: 70.6 % (ref 60–80)
BASE EXCESS BLDV CALC-SCNC: 1.9 MMOL/L
BASOPHILS # BLD AUTO: 0.04 THOUSANDS/ÂΜL (ref 0–0.1)
BASOPHILS NFR BLD AUTO: 1 % (ref 0–1)
BILIRUB SERPL-MCNC: 0.55 MG/DL (ref 0.2–1)
BILIRUB UR QL STRIP: ABNORMAL
BNP SERPL-MCNC: 38 PG/ML (ref 0–100)
BUN SERPL-MCNC: 23 MG/DL (ref 5–25)
CALCIUM SERPL-MCNC: 9 MG/DL (ref 8.4–10.2)
CARDIAC TROPONIN I PNL SERPL HS: 21 NG/L
CARDIAC TROPONIN I PNL SERPL HS: 27 NG/L
CHLORIDE SERPL-SCNC: 97 MMOL/L (ref 96–108)
CLARITY UR: ABNORMAL
CO2 SERPL-SCNC: 27 MMOL/L (ref 21–32)
COLOR UR: ABNORMAL
CREAT SERPL-MCNC: 1.87 MG/DL (ref 0.6–1.3)
D DIMER PPP FEU-MCNC: 0.73 UG/ML FEU
EOSINOPHIL # BLD AUTO: 0.14 THOUSAND/ÂΜL (ref 0–0.61)
EOSINOPHIL NFR BLD AUTO: 2 % (ref 0–6)
ERYTHROCYTE [DISTWIDTH] IN BLOOD BY AUTOMATED COUNT: 13.4 % (ref 11.6–15.1)
FLUAV RNA RESP QL NAA+PROBE: NEGATIVE
FLUBV RNA RESP QL NAA+PROBE: NEGATIVE
GFR SERPL CREATININE-BSD FRML MDRD: 26 ML/MIN/1.73SQ M
GLUCOSE SERPL-MCNC: 109 MG/DL (ref 65–140)
GLUCOSE UR STRIP-MCNC: ABNORMAL MG/DL
HCO3 BLDV-SCNC: 28 MMOL/L (ref 24–30)
HCT VFR BLD AUTO: 45.5 % (ref 34.8–46.1)
HGB BLD-MCNC: 15 G/DL (ref 11.5–15.4)
HGB UR QL STRIP.AUTO: ABNORMAL
IMM GRANULOCYTES # BLD AUTO: 0.03 THOUSAND/UL (ref 0–0.2)
IMM GRANULOCYTES NFR BLD AUTO: 1 % (ref 0–2)
KETONES UR STRIP-MCNC: NEGATIVE MG/DL
LACTATE SERPL-SCNC: 1.1 MMOL/L (ref 0.5–2)
LEUKOCYTE ESTERASE UR QL STRIP: ABNORMAL
LYMPHOCYTES # BLD AUTO: 1.32 THOUSANDS/ÂΜL (ref 0.6–4.47)
LYMPHOCYTES NFR BLD AUTO: 21 % (ref 14–44)
MCH RBC QN AUTO: 30.9 PG (ref 26.8–34.3)
MCHC RBC AUTO-ENTMCNC: 33 G/DL (ref 31.4–37.4)
MCV RBC AUTO: 94 FL (ref 82–98)
MONOCYTES # BLD AUTO: 0.8 THOUSAND/ÂΜL (ref 0.17–1.22)
MONOCYTES NFR BLD AUTO: 13 % (ref 4–12)
NEUTROPHILS # BLD AUTO: 4.01 THOUSANDS/ÂΜL (ref 1.85–7.62)
NEUTS SEG NFR BLD AUTO: 62 % (ref 43–75)
NITRITE UR QL STRIP: POSITIVE
NON-SQ EPI CELLS URNS QL MICRO: ABNORMAL /HPF
NRBC BLD AUTO-RTO: 0 /100 WBCS
O2 CT BLDV-SCNC: 15.5 ML/DL
P AXIS: -21 DEGREES
PCO2 BLDV: 48.8 MM HG (ref 42–50)
PH BLDV: 7.38 [PH] (ref 7.3–7.4)
PH UR STRIP.AUTO: 5.5 [PH]
PLATELET # BLD AUTO: 161 THOUSANDS/UL (ref 149–390)
PMV BLD AUTO: 9 FL (ref 8.9–12.7)
PO2 BLDV: 40.5 MM HG (ref 35–45)
POTASSIUM SERPL-SCNC: 3.6 MMOL/L (ref 3.5–5.3)
PR INTERVAL: 152 MS
PROCALCITONIN SERPL-MCNC: 0.16 NG/ML
PROT SERPL-MCNC: 7.4 G/DL (ref 6.4–8.4)
PROT UR STRIP-MCNC: >=300 MG/DL
QRS AXIS: 1 DEGREES
QRSD INTERVAL: 88 MS
QT INTERVAL: 400 MS
QTC INTERVAL: 434 MS
RBC # BLD AUTO: 4.85 MILLION/UL (ref 3.81–5.12)
RBC #/AREA URNS AUTO: ABNORMAL /HPF
RSV RNA RESP QL NAA+PROBE: NEGATIVE
SARS-COV-2 RNA RESP QL NAA+PROBE: NEGATIVE
SODIUM SERPL-SCNC: 136 MMOL/L (ref 135–147)
SP GR UR STRIP.AUTO: 1.02 (ref 1–1.03)
T WAVE AXIS: 32 DEGREES
UROBILINOGEN UR QL STRIP.AUTO: 1 E.U./DL
VENTRICULAR RATE: 71 BPM
WBC # BLD AUTO: 6.34 THOUSAND/UL (ref 4.31–10.16)
WBC #/AREA URNS AUTO: ABNORMAL /HPF
WBC CLUMPS # UR AUTO: PRESENT /UL

## 2024-03-01 PROCEDURE — 80053 COMPREHEN METABOLIC PANEL: CPT | Performed by: EMERGENCY MEDICINE

## 2024-03-01 PROCEDURE — 99285 EMERGENCY DEPT VISIT HI MDM: CPT | Performed by: EMERGENCY MEDICINE

## 2024-03-01 PROCEDURE — 81001 URINALYSIS AUTO W/SCOPE: CPT | Performed by: FAMILY MEDICINE

## 2024-03-01 PROCEDURE — 93005 ELECTROCARDIOGRAM TRACING: CPT

## 2024-03-01 PROCEDURE — 76775 US EXAM ABDO BACK WALL LIM: CPT

## 2024-03-01 PROCEDURE — 0241U HB NFCT DS VIR RESP RNA 4 TRGT: CPT | Performed by: EMERGENCY MEDICINE

## 2024-03-01 PROCEDURE — 36415 COLL VENOUS BLD VENIPUNCTURE: CPT | Performed by: EMERGENCY MEDICINE

## 2024-03-01 PROCEDURE — 96375 TX/PRO/DX INJ NEW DRUG ADDON: CPT

## 2024-03-01 PROCEDURE — 87147 CULTURE TYPE IMMUNOLOGIC: CPT | Performed by: FAMILY MEDICINE

## 2024-03-01 PROCEDURE — 82805 BLOOD GASES W/O2 SATURATION: CPT | Performed by: EMERGENCY MEDICINE

## 2024-03-01 PROCEDURE — 87040 BLOOD CULTURE FOR BACTERIA: CPT | Performed by: EMERGENCY MEDICINE

## 2024-03-01 PROCEDURE — 71045 X-RAY EXAM CHEST 1 VIEW: CPT

## 2024-03-01 PROCEDURE — 94640 AIRWAY INHALATION TREATMENT: CPT

## 2024-03-01 PROCEDURE — 85379 FIBRIN DEGRADATION QUANT: CPT | Performed by: EMERGENCY MEDICINE

## 2024-03-01 PROCEDURE — 84484 ASSAY OF TROPONIN QUANT: CPT | Performed by: EMERGENCY MEDICINE

## 2024-03-01 PROCEDURE — 93010 ELECTROCARDIOGRAM REPORT: CPT | Performed by: INTERNAL MEDICINE

## 2024-03-01 PROCEDURE — 99223 1ST HOSP IP/OBS HIGH 75: CPT | Performed by: FAMILY MEDICINE

## 2024-03-01 PROCEDURE — 83605 ASSAY OF LACTIC ACID: CPT | Performed by: EMERGENCY MEDICINE

## 2024-03-01 PROCEDURE — 99285 EMERGENCY DEPT VISIT HI MDM: CPT

## 2024-03-01 PROCEDURE — 96365 THER/PROPH/DIAG IV INF INIT: CPT

## 2024-03-01 PROCEDURE — 87077 CULTURE AEROBIC IDENTIFY: CPT | Performed by: FAMILY MEDICINE

## 2024-03-01 PROCEDURE — 94760 N-INVAS EAR/PLS OXIMETRY 1: CPT

## 2024-03-01 PROCEDURE — 87086 URINE CULTURE/COLONY COUNT: CPT | Performed by: FAMILY MEDICINE

## 2024-03-01 PROCEDURE — 96367 TX/PROPH/DG ADDL SEQ IV INF: CPT

## 2024-03-01 PROCEDURE — 87449 NOS EACH ORGANISM AG IA: CPT | Performed by: FAMILY MEDICINE

## 2024-03-01 PROCEDURE — 83880 ASSAY OF NATRIURETIC PEPTIDE: CPT | Performed by: EMERGENCY MEDICINE

## 2024-03-01 PROCEDURE — 84484 ASSAY OF TROPONIN QUANT: CPT | Performed by: FAMILY MEDICINE

## 2024-03-01 PROCEDURE — 85025 COMPLETE CBC W/AUTO DIFF WBC: CPT | Performed by: EMERGENCY MEDICINE

## 2024-03-01 PROCEDURE — 84145 PROCALCITONIN (PCT): CPT | Performed by: FAMILY MEDICINE

## 2024-03-01 RX ORDER — ACETAMINOPHEN 325 MG/1
650 TABLET ORAL EVERY 6 HOURS PRN
Status: DISCONTINUED | OUTPATIENT
Start: 2024-03-01 | End: 2024-03-06 | Stop reason: HOSPADM

## 2024-03-01 RX ORDER — BUDESONIDE 0.5 MG/2ML
0.5 INHALANT ORAL
Status: DISCONTINUED | OUTPATIENT
Start: 2024-03-01 | End: 2024-03-06 | Stop reason: HOSPADM

## 2024-03-01 RX ORDER — CEFTRIAXONE 1 G/50ML
1000 INJECTION, SOLUTION INTRAVENOUS EVERY 24 HOURS
Status: DISCONTINUED | OUTPATIENT
Start: 2024-03-02 | End: 2024-03-05

## 2024-03-01 RX ORDER — METHYLPREDNISOLONE SODIUM SUCCINATE 40 MG/ML
40 INJECTION, POWDER, LYOPHILIZED, FOR SOLUTION INTRAMUSCULAR; INTRAVENOUS EVERY 12 HOURS SCHEDULED
Status: COMPLETED | OUTPATIENT
Start: 2024-03-02 | End: 2024-03-05

## 2024-03-01 RX ORDER — SERTRALINE HYDROCHLORIDE 100 MG/1
100 TABLET, FILM COATED ORAL DAILY
Status: DISCONTINUED | OUTPATIENT
Start: 2024-03-02 | End: 2024-03-06 | Stop reason: HOSPADM

## 2024-03-01 RX ORDER — IPRATROPIUM BROMIDE AND ALBUTEROL SULFATE 2.5; .5 MG/3ML; MG/3ML
3 SOLUTION RESPIRATORY (INHALATION) ONCE
Status: COMPLETED | OUTPATIENT
Start: 2024-03-01 | End: 2024-03-01

## 2024-03-01 RX ORDER — NITROFURANTOIN 25; 75 MG/1; MG/1
100 CAPSULE ORAL DAILY
Status: DISCONTINUED | OUTPATIENT
Start: 2024-03-01 | End: 2024-03-06 | Stop reason: HOSPADM

## 2024-03-01 RX ORDER — GUAIFENESIN 600 MG/1
600 TABLET, EXTENDED RELEASE ORAL 2 TIMES DAILY
Status: DISCONTINUED | OUTPATIENT
Start: 2024-03-01 | End: 2024-03-06 | Stop reason: HOSPADM

## 2024-03-01 RX ORDER — SODIUM CHLORIDE, SODIUM GLUCONATE, SODIUM ACETATE, POTASSIUM CHLORIDE, MAGNESIUM CHLORIDE, SODIUM PHOSPHATE, DIBASIC, AND POTASSIUM PHOSPHATE .53; .5; .37; .037; .03; .012; .00082 G/100ML; G/100ML; G/100ML; G/100ML; G/100ML; G/100ML; G/100ML
125 INJECTION, SOLUTION INTRAVENOUS CONTINUOUS
Status: DISCONTINUED | OUTPATIENT
Start: 2024-03-01 | End: 2024-03-03

## 2024-03-01 RX ORDER — CEFTRIAXONE 1 G/50ML
1000 INJECTION, SOLUTION INTRAVENOUS ONCE
Status: COMPLETED | OUTPATIENT
Start: 2024-03-01 | End: 2024-03-01

## 2024-03-01 RX ORDER — ALBUMIN (HUMAN) 12.5 G/50ML
50 SOLUTION INTRAVENOUS ONCE
Status: DISCONTINUED | OUTPATIENT
Start: 2024-03-01 | End: 2024-03-01

## 2024-03-01 RX ORDER — METHYLPREDNISOLONE SODIUM SUCCINATE 125 MG/2ML
80 INJECTION, POWDER, LYOPHILIZED, FOR SOLUTION INTRAMUSCULAR; INTRAVENOUS ONCE
Status: COMPLETED | OUTPATIENT
Start: 2024-03-01 | End: 2024-03-01

## 2024-03-01 RX ORDER — POLYETHYLENE GLYCOL 3350 17 G/17G
17 POWDER, FOR SOLUTION ORAL DAILY
Status: DISCONTINUED | OUTPATIENT
Start: 2024-03-02 | End: 2024-03-06 | Stop reason: HOSPADM

## 2024-03-01 RX ORDER — HEPARIN SODIUM 5000 [USP'U]/ML
5000 INJECTION, SOLUTION INTRAVENOUS; SUBCUTANEOUS EVERY 8 HOURS SCHEDULED
Status: DISCONTINUED | OUTPATIENT
Start: 2024-03-01 | End: 2024-03-06 | Stop reason: HOSPADM

## 2024-03-01 RX ORDER — PHENAZOPYRIDINE HYDROCHLORIDE 100 MG/1
100 TABLET, FILM COATED ORAL
Status: DISCONTINUED | OUTPATIENT
Start: 2024-03-01 | End: 2024-03-06 | Stop reason: HOSPADM

## 2024-03-01 RX ORDER — ONDANSETRON 2 MG/ML
4 INJECTION INTRAMUSCULAR; INTRAVENOUS EVERY 6 HOURS PRN
Status: DISCONTINUED | OUTPATIENT
Start: 2024-03-01 | End: 2024-03-06 | Stop reason: HOSPADM

## 2024-03-01 RX ORDER — ATORVASTATIN CALCIUM 40 MG/1
40 TABLET, FILM COATED ORAL
Status: DISCONTINUED | OUTPATIENT
Start: 2024-03-01 | End: 2024-03-06 | Stop reason: HOSPADM

## 2024-03-01 RX ORDER — NICOTINE 21 MG/24HR
1 PATCH, TRANSDERMAL 24 HOURS TRANSDERMAL DAILY
Status: DISCONTINUED | OUTPATIENT
Start: 2024-03-02 | End: 2024-03-06 | Stop reason: HOSPADM

## 2024-03-01 RX ORDER — PRAMIPEXOLE DIHYDROCHLORIDE 0.5 MG/1
0.5 TABLET ORAL 2 TIMES DAILY
Status: DISCONTINUED | OUTPATIENT
Start: 2024-03-01 | End: 2024-03-06 | Stop reason: HOSPADM

## 2024-03-01 RX ORDER — POTASSIUM CHLORIDE 750 MG/1
10 TABLET, EXTENDED RELEASE ORAL DAILY
Status: DISCONTINUED | OUTPATIENT
Start: 2024-03-02 | End: 2024-03-06 | Stop reason: HOSPADM

## 2024-03-01 RX ORDER — DOCUSATE SODIUM 100 MG/1
100 CAPSULE, LIQUID FILLED ORAL 2 TIMES DAILY
Status: DISCONTINUED | OUTPATIENT
Start: 2024-03-01 | End: 2024-03-06 | Stop reason: HOSPADM

## 2024-03-01 RX ORDER — PANTOPRAZOLE SODIUM 20 MG/1
20 TABLET, DELAYED RELEASE ORAL
Status: DISCONTINUED | OUTPATIENT
Start: 2024-03-02 | End: 2024-03-06 | Stop reason: HOSPADM

## 2024-03-01 RX ORDER — SODIUM CHLORIDE FOR INHALATION 0.9 %
3 VIAL, NEBULIZER (ML) INHALATION
Status: DISCONTINUED | OUTPATIENT
Start: 2024-03-01 | End: 2024-03-02

## 2024-03-01 RX ORDER — LEVALBUTEROL INHALATION SOLUTION 1.25 MG/3ML
1.25 SOLUTION RESPIRATORY (INHALATION)
Status: DISCONTINUED | OUTPATIENT
Start: 2024-03-01 | End: 2024-03-03

## 2024-03-01 RX ORDER — METOPROLOL SUCCINATE 25 MG/1
25 TABLET, EXTENDED RELEASE ORAL DAILY
Status: DISCONTINUED | OUTPATIENT
Start: 2024-03-02 | End: 2024-03-06 | Stop reason: HOSPADM

## 2024-03-01 RX ADMIN — CEFTRIAXONE 1000 MG: 1 INJECTION, SOLUTION INTRAVENOUS at 14:58

## 2024-03-01 RX ADMIN — PHENAZOPYRIDINE 100 MG: 100 TABLET ORAL at 16:38

## 2024-03-01 RX ADMIN — IPRATROPIUM BROMIDE 0.5 MG: 0.5 SOLUTION RESPIRATORY (INHALATION) at 21:26

## 2024-03-01 RX ADMIN — HEPARIN SODIUM 5000 UNITS: 5000 INJECTION INTRAVENOUS; SUBCUTANEOUS at 16:38

## 2024-03-01 RX ADMIN — LEVALBUTEROL HYDROCHLORIDE 1.25 MG: 1.25 SOLUTION RESPIRATORY (INHALATION) at 21:25

## 2024-03-01 RX ADMIN — BUDESONIDE 0.5 MG: 0.5 INHALANT ORAL at 21:25

## 2024-03-01 RX ADMIN — IPRATROPIUM BROMIDE AND ALBUTEROL SULFATE 3 ML: 2.5; .5 SOLUTION RESPIRATORY (INHALATION) at 14:53

## 2024-03-01 RX ADMIN — PRAMIPEXOLE DIHYDROCHLORIDE 0.5 MG: 0.5 TABLET ORAL at 17:51

## 2024-03-01 RX ADMIN — SODIUM CHLORIDE, SODIUM GLUCONATE, SODIUM ACETATE, POTASSIUM CHLORIDE, MAGNESIUM CHLORIDE, SODIUM PHOSPHATE, DIBASIC, AND POTASSIUM PHOSPHATE 125 ML/HR: .53; .5; .37; .037; .03; .012; .00082 INJECTION, SOLUTION INTRAVENOUS at 17:51

## 2024-03-01 RX ADMIN — HEPARIN SODIUM 5000 UNITS: 5000 INJECTION INTRAVENOUS; SUBCUTANEOUS at 23:08

## 2024-03-01 RX ADMIN — DOCUSATE SODIUM 100 MG: 100 CAPSULE, LIQUID FILLED ORAL at 17:51

## 2024-03-01 RX ADMIN — NITROFURANTOIN (MONOHYDRATE/MACROCRYSTALS) 100 MG: 75; 25 CAPSULE ORAL at 16:38

## 2024-03-01 RX ADMIN — GUAIFENESIN 600 MG: 600 TABLET ORAL at 17:51

## 2024-03-01 RX ADMIN — METHYLPREDNISOLONE SODIUM SUCCINATE 80 MG: 125 INJECTION, POWDER, FOR SOLUTION INTRAMUSCULAR; INTRAVENOUS at 14:54

## 2024-03-01 RX ADMIN — ATORVASTATIN CALCIUM 40 MG: 40 TABLET, FILM COATED ORAL at 16:38

## 2024-03-01 RX ADMIN — SODIUM CHLORIDE, SODIUM LACTATE, POTASSIUM CHLORIDE, AND CALCIUM CHLORIDE 1000 ML: .6; .31; .03; .02 INJECTION, SOLUTION INTRAVENOUS at 15:42

## 2024-03-01 NOTE — H&P
[facility]  [facility]  Name: Moriah Phillips 74 y.o. female I MRN: [id number]  Unit/Bed#: ED 01 I Date of Admission: 3/1/2024   Date of Service: 3/1/2024 I Hospital Day: 0      Assessment/Plan   PRESTON (acute kidney injury) (HCC)  Assessment & Plan  Creatinine is 1.87  Baseline creatinine ranges between 0.9-1.0  Patient does have history of bladder cancer  Avoid nephrotoxic medication, hypotension  Continue IV hydration, retention protocol, check ultrasound of kidney bladder    * Acute respiratory failure with hypoxia (HCC)  Assessment & Plan  Patient is saturating 86% on room air, now requiring 2 L of oxygen secondary to left lower lobe pneumonia/COPD exacerbation secondary to pneumonia  Continue supplemental oxygen, respiratory protocol, continue treatment for pneumonia and COPD  COVID/flu panel negative    Community acquired pneumonia of left lower lobe of lung  Assessment & Plan  Imaging shows right lower lobe pneumonia  Continue IV antibiotic, IV hydration, antiemetic, antipyretics  Respiratory protocol, follow blood culture, urine Legionella, pneumonia, procalcitonin  COVID/flu panel negative    CPAP (continuous positive airway pressure) dependence  Assessment & Plan  Continue CPAP.    Chronic obstructive pulmonary disease with acute lower respiratory infection (HCC)  Assessment & Plan  Aggravated secondary to pneumonia  Continue treatment for pneumonia, inhaled nebulizer, IV steroid, respiratory protocol    Malignant neoplasm of urinary bladder, unspecified site (HCC)  Assessment & Plan  Recently evaluated by [facility] urology, status post cystoscopy-did not reveal any evidence of recurrent bladder cancer.  Per urology, patient placed on Macrobid for recurrent UTI, continue           VTE Pharmacologic Prophylaxis: VTE Score: 7 High Risk (Score >/= 5) - Pharmacological DVT Prophylaxis Ordered: heparin. Sequential Compression Devices Ordered.  Code Status: Level 1 - Full Code per  patient  Discussion with family: Updated  () at bedside.    Anticipated Length of Stay: Patient will be admitted on an inpatient basis with an anticipated length of stay of greater than 2 midnights secondary to monitor above conditions.      Chief Complaint: Cough, shortness of breath, sore throat    History of Present Illness:  Moriah Phillips is a 74 y.o. female with a PMH of bladder cancer, COPD, sleep apnea who presents with cough, shortness of breath, sore throat with going on for last 3 days.  Denies any nausea, vomiting.  Reports anorexia and nausea.  Also complaining of dry mouth..    Review of Systems:  Review of Systems   Constitutional:  Positive for activity change, appetite change and fatigue. Negative for chills, diaphoresis, fever and unexpected weight change.   Respiratory:  Positive for cough, shortness of breath and wheezing. Negative for apnea.    Cardiovascular:  Negative for chest pain, palpitations and leg swelling.   Gastrointestinal:  Positive for nausea. Negative for abdominal distention, abdominal pain, blood in stool and vomiting.   Genitourinary:  Negative for difficulty urinating, dyspareunia, dysuria and enuresis.   Musculoskeletal:  Negative for arthralgias, back pain and gait problem.   Skin:  Negative for color change, pallor and rash.   Neurological:  Negative for dizziness, facial asymmetry and headaches.   Psychiatric/Behavioral:  Negative for agitation, behavioral problems and confusion.    All other systems reviewed and are negative.      Past Medical and Surgical History:   Past Medical History:   Diagnosis Date    Acute UTI 2014    Arthritis 2016    Bladder cancer (HCC)     COPD (chronic obstructive pulmonary disease) ([facility]) 2016    CPAP (continuous positive airway pressure) dependence     Dependence on nicotine from cigarettes 2016    Depression 2016    Dupuytren's disease of palm 2018    Dyspnea on exertion     GERD (gastroesophageal reflux disease)      Heart murmur     Hyperlipidemia     Hypertension     Hypokalemia 2016    Left ventricular hypertrophy     Mixed stress and urge urinary incontinence     Non-rheumatic mitral regurgitation 2023    Osteoarthritis 2016    left knee    Osteopenia     Panlobular emphysema (HCC)     Recurrent UTI     RLS (restless legs syndrome) 2016    Sleep apnea     Sleep apnea 2023       Past Surgical History:   Procedure Laterality Date    CATARACT EXTRACTION Bilateral     CYSTOSCOPY      ILEOSTOMY      MI CYSTOURETHROSCOPY N/A 11/17/2023    Procedure: CYSTOSCOPY  bilateral retrograde pyelograms;  Surgeon: Ryan Saab MD;  Location:  MAIN OR;  Service: Urology    REPLACEMENT TOTAL KNEE BILATERAL      TONSILLECTOMY         Meds/Allergies:  Prior to Admission medications    Medication Sig Start Date End Date Taking? Authorizing Provider   aspirin (ECOTRIN LOW STRENGTH) 81 mg EC tablet Take 81 mg by mouth daily    Historical Provider, MD   celecoxib (CeleBREX) 200 mg capsule Take 1 capsule by mouth daily 9/5/23   Historical Provider, MD   Fluticasone-Umeclidin-Vilant (TRELEGY ELLIPTA IN) Inhale    Historical Provider, MD   Fructooligosaccharides (FOS PO) Take 1,600 mg by mouth    Historical Provider, MD   metoprolol succinate (TOPROL-XL) 25 mg 24 hr tablet Take 1 tablet by mouth daily 8/14/23   Historical Provider, MD   nitrofurantoin (MACROBID) 100 mg capsule Take 1 capsule (100 mg total) by mouth in the morning 10/28/23   Ryan Saab MD   omeprazole (PriLOSEC OTC) 20 MG tablet Take 20 mg by mouth daily    Historical Provider, MD   phenazopyridine (PYRIDIUM) 200 mg tablet TAKE ONE TABLET BY MOUTH THREE TIMES DAILY AS NEEDED FOR BLADDER SPASMS. 10/10/23   Historical Provider, MD   potassium chloride (K-DUR,KLOR-CON) 10 mEq tablet Take 10 mEq by mouth daily    Historical Provider, MD   pramipexole (MIRAPEX) 0.5 mg tablet Take 1 tablet by mouth 2 (two) times a day 8/21/23   Historical Provider, MD   rosuvastatin (CRESTOR) 5 mg  tablet Take 5 mg by mouth daily 5/15/23   Historical Provider, MD   sertraline (ZOLOFT) 100 mg tablet Take 100 mg by mouth daily    Historical Provider, MD   telmisartan-hydrochlorothiazide (MICARDIS HCT) 80-25 MG per tablet Take 1 tablet by mouth daily    Historical Provider, MD [place]er Ellipta 200-62.5-25 MCG/ACT AEPB inhaler Inhale 1 puff daily 6/26/23   Historical Provider, MD   ketorolac (ACULAR) 0.5 % ophthalmic solution Start 3 days preop INSTILL ONE DROP INTO OPERATIVE EYE(S) FOUR TIMES DAILY. CONTINUE POSTOP FOR 4 WEEKS  Patient not taking: Reported on 10/25/2023 7/7/23 3/1/24  Historical Provider, MD   omeprazole (PriLOSEC) 20 mg delayed release capsule Take 20 mg by mouth daily  Patient not taking: Reported on 10/25/2023 8/15/23 3/1/24  Historical Provider, MD FINK have reviewed home medications with patient personally.    Allergies:   Allergies   Allergen Reactions    Penicillins Hives    Morphine GI Intolerance     n, v       Social History:  Marital Status: /Civil Union   Occupation: Unknown  Patient Pre-hospital Living Situation: Home  Patient Pre-hospital Level of Mobility: walks  Patient Pre-hospital Diet Restrictions: Cardiac diet  Substance Use History:   Social History     Substance and Sexual Activity   Alcohol Use Yes    Alcohol/week: 2.0 standard drinks of alcohol    Types: 2 Standard drinks or equivalent per week    Comment: Occasionally     Social History     Tobacco Use   Smoking Status Every Day    Current packs/day: 1.00    Average packs/day: 1 pack/day for 50.0 years (50.0 ttl pk-yrs)    Types: Cigarettes   Smokeless Tobacco Never     Social History     Substance and Sexual Activity   Drug Use Not Currently       Family History:  History reviewed. No pertinent family history.    Physical Exam:     Vitals:   Blood Pressure: 94/50 (03/01/24 1619)  Pulse: 62 (03/01/24 1619)  Temperature: 97.5 °F (36.4 °C) (03/01/24 1420)  Temp Source: Temporal (03/01/24 1420)  Respirations: 20  (03/01/24 1615)  SpO2: 90 % (03/01/24 1619)    Physical Exam  Vitals and nursing note reviewed.   Constitutional:       Appearance: Normal appearance. She is not ill-appearing or diaphoretic.   HENT:      Mouth/Throat:      Mouth: Mucous membranes are moist.      Pharynx: No oropharyngeal exudate or posterior oropharyngeal erythema.   Eyes:      General: No scleral icterus.        Left eye: No discharge.      Extraocular Movements: Extraocular movements intact.      Conjunctiva/sclera: Conjunctivae normal.      Pupils: Pupils are equal, round, and reactive to light.   Cardiovascular:      Rate and Rhythm: Normal rate.      Heart sounds:      No friction rub. No gallop.   Pulmonary:      Breath sounds: Wheezing and rhonchi present.      Comments: Tachypneic, increased breath sounds.  Chest:      Chest wall: No tenderness.   Abdominal:      General: Abdomen is flat. Bowel sounds are normal. There is no distension.      Palpations: There is no mass.      Tenderness: There is no abdominal tenderness.      Hernia: No hernia is present.   Musculoskeletal:      Right lower leg: No edema.      Left lower leg: No edema.   Skin:     General: Skin is warm.      Coloration: Skin is not jaundiced or pale.      Findings: No bruising.   Neurological:      General: No focal deficit present.      Mental Status: She is alert and oriented to person, place, and time.      Cranial Nerves: No cranial nerve deficit.      Sensory: No sensory deficit.      Motor: No weakness.      Coordination: Coordination normal.          Additional Data:     Lab Results:  Results from last 7 days   Lab Units 03/01/24  1446   WBC Thousand/uL 6.34   HEMOGLOBIN g/dL 15.0   HEMATOCRIT % 45.5   PLATELETS Thousands/uL 161   NEUTROS PCT % 62   LYMPHS PCT % 21   MONOS PCT % 13*   EOS PCT % 2     Results from last 7 days   Lab Units 03/01/24  1446   SODIUM mmol/L 136   POTASSIUM mmol/L 3.6   CHLORIDE mmol/L 97   CO2 mmol/L 27   BUN mg/dL 23   CREATININE mg/dL  1.87*   ANION GAP mmol/L 12   CALCIUM mg/dL 9.0   ALBUMIN g/dL 4.2   TOTAL BILIRUBIN mg/dL 0.55   ALK PHOS U/L 58   ALT U/L 20   AST U/L 28   GLUCOSE RANDOM mg/dL 109                 Results from last 7 days   Lab Units 03/01/24  1446   LACTIC ACID mmol/L 1.1       Lines/Drains:  Invasive Devices       Peripheral Intravenous Line  Duration             Peripheral IV 03/01/24 Proximal;Right;Ventral (anterior) Forearm <1 day                        Imaging: Reviewed radiology reports from this admission including: chest xray  XR chest 1 view portable   Final Result by Tone Dove MD (03/01 4202)      Left lower lobe atelectasis versus infiltrate.      Pneumonia is not excluded.      Follow-up in 4 to 6 weeks time is advised.      The study was marked in EPIC for immediate notification.            Workstation performed: [id number]         US kidney and bladder with pvr    (Results Pending)       EKG and Other Studies Reviewed on Admission:   EKG: Normal sinus rhythm on monitor.    ** Please Note: This note has been constructed using a voice recognition system. **

## 2024-03-01 NOTE — ED PROVIDER NOTES
History  Chief Complaint   Patient presents with    Shortness of Breath     Patient c/o sob, cough and fatigue that has been worsening       History provided by:  Medical records and patient  Shortness of Breath  Severity:  Moderate  Onset quality:  Gradual  Duration:  3 days  Timing:  Constant  Progression:  Unchanged  Chronicity:  New  Context comment:  Patient with 3 days of cough, shortness of breath, sore throat, dry mouth, anorexia and nausea  Relieved by:  Nothing  Worsened by:  Nothing  Ineffective treatments:  Inhaler  Associated symptoms: cough and sore throat    Associated symptoms: no abdominal pain, no chest pain, no ear pain, no fever, no headaches, no rash and no vomiting    Risk factors: tobacco use        Prior to Admission Medications   Prescriptions Last Dose Informant Patient Reported? Taking?   Fluticasone-Umeclidin-Vilant (TRELEGY ELLIPTA IN) 2/29/2024  Yes Yes   Sig: Inhale   Fructooligosaccharides (FOS PO) Not Taking  Yes No   Sig: Take 1,600 mg by mouth   Patient not taking: Reported on 3/1/2024   Trelegy Ellipta 200-62.5-25 MCG/ACT AEPB inhaler 2/29/2024  Yes Yes   Sig: Inhale 1 puff daily   aspirin (ECOTRIN LOW STRENGTH) 81 mg EC tablet 3/1/2024  Yes Yes   Sig: Take 81 mg by mouth daily   celecoxib (CeleBREX) 200 mg capsule 3/1/2024  Yes Yes   Sig: Take 1 capsule by mouth daily   metoprolol succinate (TOPROL-XL) 25 mg 24 hr tablet 3/1/2024  Yes Yes   Sig: Take 1 tablet by mouth daily   nitrofurantoin (MACROBID) 100 mg capsule Not Taking  No No   Sig: Take 1 capsule (100 mg total) by mouth in the morning   Patient not taking: Reported on 3/1/2024   omeprazole (PriLOSEC OTC) 20 MG tablet 3/1/2024  Yes Yes   Sig: Take 20 mg by mouth daily   phenazopyridine (PYRIDIUM) 200 mg tablet 3/1/2024  Yes Yes   Sig: TAKE ONE TABLET BY MOUTH THREE TIMES DAILY AS NEEDED FOR BLADDER SPASMS.   potassium chloride (K-DUR,KLOR-CON) 10 mEq tablet 3/1/2024  Yes Yes   Sig: Take 10 mEq by mouth daily   pramipexole  (MIRAPEX) 0.5 mg tablet 3/1/2024  Yes Yes   Sig: Take 1 tablet by mouth 2 (two) times a day   rosuvastatin (CRESTOR) 5 mg tablet 3/1/2024  Yes Yes   Sig: Take 5 mg by mouth daily   sertraline (ZOLOFT) 100 mg tablet 3/1/2024  Yes Yes   Sig: Take 100 mg by mouth daily   telmisartan-hydrochlorothiazide (MICARDIS HCT) 80-25 MG per tablet 3/1/2024  Yes Yes   Sig: Take 1 tablet by mouth daily      Facility-Administered Medications: None       Past Medical History:   Diagnosis Date    Acute UTI 2014    Arthritis 2016    Bladder cancer (HCC)     COPD (chronic obstructive pulmonary disease) (HCC) 2016    CPAP (continuous positive airway pressure) dependence     Dependence on nicotine from cigarettes 2016    Depression 2016    Dupuytren's disease of palm 2018    Dyspnea on exertion     GERD (gastroesophageal reflux disease)     Heart murmur     Hyperlipidemia     Hypertension     Hypokalemia 2016    Left ventricular hypertrophy     Mixed stress and urge urinary incontinence     Non-rheumatic mitral regurgitation 2023    Osteoarthritis 2016    left knee    Osteopenia     Panlobular emphysema (HCC)     Recurrent UTI     RLS (restless legs syndrome) 2016    Sleep apnea     Sleep apnea 2023       Past Surgical History:   Procedure Laterality Date    CATARACT EXTRACTION Bilateral     CYSTOSCOPY      ILEOSTOMY      MO CYSTOURETHROSCOPY N/A 11/17/2023    Procedure: CYSTOSCOPY  bilateral retrograde pyelograms;  Surgeon: Ryan Saab MD;  Location: [facility] OR;  Service: Urology    REPLACEMENT TOTAL KNEE BILATERAL      TONSILLECTOMY         History reviewed. No pertinent family history.  I have reviewed and agree with the history as documented.    E-Cigarette/Vaping    E-Cigarette Use Never User      E-Cigarette/Vaping Substances    Nicotine No     THC No     CBD No     Flavoring No     Other No     Unknown No      Social History     Tobacco Use    Smoking status: Every Day     Current packs/day: 1.00     Average packs/day: 1 pack/day  for 50.0 years (50.0 ttl pk-yrs)     Types: Cigarettes    Smokeless tobacco: Never   Vaping Use    Vaping status: Never Used   Substance Use Topics    Alcohol use: Yes     Alcohol/week: 2.0 standard drinks of alcohol     Types: 2 Standard drinks or equivalent per week     Comment: Occasionally    Drug use: Not Currently       Review of Systems   Constitutional:  Positive for appetite change and fatigue. Negative for chills and fever.   HENT:  Positive for sore throat and trouble swallowing. Negative for ear discharge, ear pain and rhinorrhea.         Severe dry mouth   Eyes:  Negative for pain and visual disturbance.   Respiratory:  Positive for cough and shortness of breath.    Cardiovascular:  Negative for chest pain and palpitations.   Gastrointestinal:  Negative for abdominal pain, diarrhea, nausea and vomiting.   Endocrine: Negative for polydipsia, polyphagia and polyuria.   Genitourinary:  Negative for difficulty urinating, dysuria, flank pain and hematuria.   Musculoskeletal:  Negative for arthralgias and back pain.   Skin:  Negative for color change and rash.   Allergic/Immunologic: Negative for immunocompromised state.   Neurological:  Negative for dizziness, seizures, syncope, weakness and headaches.   Psychiatric/Behavioral:  Negative for confusion and self-injury. The patient is not nervous/anxious.    All other systems reviewed and are negative.      Physical Exam  Physical Exam  Vitals and nursing note reviewed.   Constitutional:       General: She is not in acute distress.     Appearance: Normal appearance. She is well-developed. She is not ill-appearing, toxic-appearing or diaphoretic.   HENT:      Head: Normocephalic and atraumatic.      Nose: Nose normal. No congestion or rhinorrhea.      Mouth/Throat:      Pharynx: Oropharynx is clear. No pharyngeal swelling, oropharyngeal exudate or posterior oropharyngeal erythema.      Comments: Dry mucous membranes, posterior oropharyngeal cobblestoning  Eyes:       General:         Right eye: No discharge.         Left eye: No discharge.      Extraocular Movements: Extraocular movements intact.      Pupils: Pupils are equal, round, and reactive to light.   Cardiovascular:      Rate and Rhythm: Normal rate and regular rhythm.      Pulses: Normal pulses.      Heart sounds: Normal heart sounds. No murmur heard.     No gallop.   Pulmonary:      Effort: Pulmonary effort is normal. No respiratory distress.      Breath sounds: No stridor. Examination of the right-lower field reveals rales. Examination of the left-lower field reveals rales. Rales present. No wheezing or rhonchi.   Chest:      Chest wall: No tenderness.   Abdominal:      General: Bowel sounds are normal. There is no distension.      Palpations: Abdomen is soft. There is no mass.      Tenderness: There is no abdominal tenderness. There is no right CVA tenderness, left CVA tenderness, guarding or rebound.      Hernia: No hernia is present.   Musculoskeletal:         General: Normal range of motion.      Cervical back: Normal range of motion and neck supple.   Skin:     General: Skin is warm and dry.      Capillary Refill: Capillary refill takes less than 2 seconds.   Neurological:      General: No focal deficit present.      Mental Status: She is alert and oriented to person, place, and time.      Cranial Nerves: No cranial nerve deficit.      Sensory: No sensory deficit.      Motor: No weakness.      Coordination: Coordination normal.      Gait: Gait normal.      Deep Tendon Reflexes: Reflexes normal.   Psychiatric:         Mood and Affect: Mood normal.         Behavior: Behavior normal.         Thought Content: Thought content normal.         Judgment: Judgment normal.         Vital Signs  ED Triage Vitals   Temperature Pulse Respirations Blood Pressure SpO2   03/01/24 1420 03/01/24 1420 03/01/24 1420 03/01/24 1420 03/01/24 1420   97.5 °F (36.4 °C) 72 20 97/52 (!) 86 %      Temp Source Heart Rate Source Patient  Position - Orthostatic VS BP Location FiO2 (%)   03/01/24 1420 03/01/24 1420 03/01/24 1420 03/01/24 1420 --   Temporal Monitor Sitting Left arm       Pain Score       03/01/24 2038       No Pain           Vitals:    03/01/24 1930 03/01/24 2000 03/01/24 2036 03/01/24 2036   BP: 108/58 112/66 127/60 127/60   Pulse: 58 59 59 59   Patient Position - Orthostatic VS: Lying Lying           Visual Acuity      ED Medications  Medications   aspirin (ECOTRIN LOW STRENGTH) EC tablet 81 mg (has no administration in time range)   metoprolol succinate (TOPROL-XL) 24 hr tablet 25 mg (has no administration in time range)   nitrofurantoin (MACROBID) extended-release capsule 100 mg (100 mg Oral Given 3/1/24 1638)   pantoprazole (PROTONIX) EC tablet 20 mg (has no administration in time range)   phenazopyridine (PYRIDIUM) tablet 100 mg (100 mg Oral Given 3/1/24 1638)   potassium chloride (Klor-Con M10) CR tablet 10 mEq (has no administration in time range)   pramipexole (MIRAPEX) tablet 0.5 mg (0.5 mg Oral Given 3/1/24 1751)   atorvastatin (LIPITOR) tablet 40 mg (40 mg Oral Given 3/1/24 1638)   sertraline (ZOLOFT) tablet 100 mg (has no administration in time range)   multi-electrolyte (PLASMALYTE-A/ISOLYTE-S PH 7.4) IV solution (125 mL/hr Intravenous New Bag 3/1/24 1751)   acetaminophen (TYLENOL) tablet 650 mg (has no administration in time range)   docusate sodium (COLACE) capsule 100 mg (100 mg Oral Given 3/1/24 1751)   polyethylene glycol (MIRALAX) packet 17 g (has no administration in time range)   ondansetron (ZOFRAN) injection 4 mg (has no administration in time range)   nicotine (NICODERM CQ) 14 mg/24hr TD 24 hr patch 1 patch (has no administration in time range)   guaiFENesin (MUCINEX) 12 hr tablet 600 mg (600 mg Oral Given 3/1/24 1751)   heparin (porcine) subcutaneous injection 5,000 Units (5,000 Units Subcutaneous Given 3/1/24 4753)   cefTRIAXone (ROCEPHIN) IVPB (premix in dextrose) 1,000 mg 50 mL (has no administration in  time range)   levalbuterol (XOPENEX) inhalation solution 1.25 mg (has no administration in time range)     And   sodium chloride 0.9 % inhalation solution 3 mL (has no administration in time range)   ipratropium (ATROVENT) 0.02 % inhalation solution 0.5 mg (has no administration in time range)   budesonide (PULMICORT) inhalation solution 0.5 mg (has no administration in time range)   methylPREDNISolone sodium succinate (Solu-MEDROL) injection 40 mg (has no administration in time range)   lactated ringers bolus 1,000 mL (0 mL Intravenous Stopped 3/1/24 1751)   ipratropium-albuterol (DUO-NEB) 0.5-2.5 mg/3 mL inhalation solution 3 mL (3 mL Nebulization Given 3/1/24 1453)   methylPREDNISolone sodium succinate (Solu-MEDROL) injection 80 mg (80 mg Intravenous Given 3/1/24 1454)   cefTRIAXone (ROCEPHIN) IVPB (premix in dextrose) 1,000 mg 50 mL (0 mg Intravenous Stopped 3/1/24 1542)       Diagnostic Studies  Results Reviewed       Procedure Component Value Units Date/Time    UA w Reflex to Microscopic w Reflex to Culture [[id number]]  (Abnormal) Collected: 03/01/24 2035    Lab Status: Final result Specimen: Urine, Clean Catch Updated: 03/01/24 2047     Color, UA Orange     Clarity, UA Turbid     Specific Gravity, UA 1.020     pH, UA 5.5     Leukocytes, UA Moderate     Nitrite, UA Positive     Protein, UA >=300 mg/dl      Glucose,  (1/10%) mg/dl      Ketones, UA Negative mg/dl      Urobilinogen, UA 1.0 E.U./dl      Bilirubin, UA Small     Occult Blood, UA Large    Strep Pneumoniae, Urine [[id number]] Collected: 03/01/24 2034    Lab Status: In process Specimen: Urine, Clean Catch Updated: 03/01/24 2040    Legionella antigen, Urine [[id number]] Collected: 03/01/24 2034    Lab Status: In process Specimen: Urine, Clean Catch Updated: 03/01/24 2040    Procalcitonin [[id number]]  (Normal) Collected: 03/01/24 1756    Lab Status: Final result Specimen: Blood from Arm, Right Updated: 03/01/24 1829     Procalcitonin 0.16 ng/ml      HS Troponin I 2hr [[id number]]  (Normal) Collected: 03/01/24 1756    Lab Status: Final result Specimen: Blood from Arm, Right Updated: 03/01/24 1826     hs TnI 2hr 21 ng/L      Delta 2hr hsTnI -6 ng/L     Sputum culture and Gram stain [[id number]]     Lab Status: No result Specimen: Expectorated Sputum     FLU/RSV/COVID - if FLU/RSV clinically relevant [[id number]]  (Normal) Collected: 03/01/24 1446    Lab Status: Final result Specimen: Nares from Nose Updated: 03/01/24 1538     SARS-CoV-2 Negative     INFLUENZA A PCR Negative     INFLUENZA B PCR Negative     RSV PCR Negative    Narrative:      FOR PEDIATRIC PATIENTS - copy/paste COVID Guidelines URL to browser: [email or web address]    SARS-CoV-2 assay is a Nucleic Acid Amplification assay intended for the  qualitative detection of nucleic acid from SARS-CoV-2 in nasopharyngeal  swabs. Results are for the presumptive identification of SARS-CoV-2 RNA.    Positive results are indicative of infection with SARS-CoV-2, the virus  causing COVID-19, but do not rule out bacterial infection or co-infection  with other viruses. Laboratories within the [place] and its  territories are required to report all positive results to the appropriate  public health authorities. Negative results do not preclude SARS-CoV-2  infection and should not be used as the sole basis for treatment or other  patient management decisions. Negative results must be combined with  clinical observations, patient history, and epidemiological information.  This test has not been FDA cleared or approved.    This test has been authorized by FDA under an Emergency Use Authorization  (EUA). This test is only authorized for the duration of time the  declaration that circumstances exist justifying the authorization of the  emergency use of an in vitro diagnostic tests for detection of SARS-CoV-2  virus and/or diagnosis of COVID-19 infection under section  564(b)(1) of  the Act, 21 U.S.C. 360bbb-3(b)(1), unless the authorization is terminated  or revoked sooner. The test has been validated but independent review by FDA  and CLIA is pending.    Test performed using [employer] GeneXpert: This RT-PCR assay targets N2,  a region unique to SARS-CoV-2. A conserved region in the E-gene was chosen  for pan-Sarbecovirus detection which includes SARS-CoV-2.    According to CMS-2020-01-R, this platform meets the definition of high-throughput technology.    D-Dimer [[id number]]  (Abnormal) Collected: 03/01/24 1446    Lab Status: Final result Specimen: Blood from Arm, Right Updated: 03/01/24 1532     D-Dimer, Quant 0.73 ug/ml FEU     Narrative:      In the evaluation for possible pulmonary embolism, in the appropriate (Well's Score of 4 or less) patient, the age adjusted d-dimer cutoff for this patient can be calculated as:    Age x 0.01 (in ug/mL) for Age-adjusted D-dimer exclusion threshold for a patient over 50 years.    Lactic acid, plasma (w/reflex if result > 2.0) [[id number]]  (Normal) Collected: 03/01/24 1446    Lab Status: Final result Specimen: Blood from Arm, Right Updated: 03/01/24 1523     LACTIC ACID 1.1 mmol/L     Narrative:      Result may be elevated if tourniquet was used during collection.    HS Troponin 0hr (reflex protocol) [[id number]]  (Normal) Collected: 03/01/24 1446    Lab Status: Final result Specimen: Blood from Arm, Right Updated: 03/01/24 1522     hs TnI 0hr 27 ng/L     B-Type Natriuretic Peptide(BNP) [[id number]]  (Normal) Collected: 03/01/24 1446    Lab Status: Final result Specimen: Blood from Arm, Right Updated: 03/01/24 1522     BNP 38 pg/mL     Comprehensive metabolic panel [[id number]]  (Abnormal) Collected: 03/01/24 1446    Lab Status: Final result Specimen: Blood from Arm, Right Updated: 03/01/24 1521     Sodium 136 mmol/L      Potassium 3.6 mmol/L      Chloride 97 mmol/L      CO2 27 mmol/L      ANION GAP 12 mmol/L      BUN 23 mg/dL      Creatinine  1.87 mg/dL      Glucose 109 mg/dL      Calcium 9.0 mg/dL      AST 28 U/L      ALT 20 U/L      Alkaline Phosphatase 58 U/L      Total Protein 7.4 g/dL      Albumin 4.2 g/dL      Total Bilirubin 0.55 mg/dL      eGFR 26 ml/min/1.73sq m     Narrative:      National Kidney Disease Foundation guidelines for Chronic Kidney Disease (CKD):     Stage 1 with normal or high GFR (GFR > 90 mL/min/1.73 square meters)    Stage 2 Mild CKD (GFR = 60-89 mL/min/1.73 square meters)    Stage 3A Moderate CKD (GFR = 45-59 mL/min/1.73 square meters)    Stage 3B Moderate CKD (GFR = 30-44 mL/min/1.73 square meters)    Stage 4 Severe CKD (GFR = 15-29 mL/min/1.73 square meters)    Stage 5 End Stage CKD (GFR <15 mL/min/1.73 square meters)  Note: GFR calculation is accurate only with a steady state creatinine    Blood gas, venous [[id number]] Collected: 03/01/24 1446    Lab Status: Final result Specimen: Blood from Arm, Right Updated: 03/01/24 1458     pH, Filipe 7.376     pCO2, Filipe 48.8 mm Hg      pO2, Filipe 40.5 mm Hg      HCO3, Filipe 28.0 mmol/L      Base Excess, Filipe 1.9 mmol/L      O2 Content, Filipe 15.5 ml/dL      O2 HGB, VENOUS 70.6 %     CBC and differential [[id number]]  (Abnormal) Collected: 03/01/24 1446    Lab Status: Final result Specimen: Blood from Arm, Right Updated: 03/01/24 1455     WBC 6.34 Thousand/uL      RBC 4.85 Million/uL      Hemoglobin 15.0 g/dL      Hematocrit 45.5 %      MCV 94 fL      MCH 30.9 pg      MCHC 33.0 g/dL      RDW 13.4 %      MPV 9.0 fL      Platelets 161 Thousands/uL      nRBC 0 /100 WBCs      Neutrophils Relative 62 %      Immat GRANS % 1 %      Lymphocytes Relative 21 %      Monocytes Relative 13 %      Eosinophils Relative 2 %      Basophils Relative 1 %      Neutrophils Absolute 4.01 Thousands/µL      Immature Grans Absolute 0.03 Thousand/uL      Lymphocytes Absolute 1.32 Thousands/µL      Monocytes Absolute 0.80 Thousand/µL      Eosinophils Absolute 0.14 Thousand/µL      Basophils Absolute 0.04 Thousands/µL      Blood culture #1 [[id number]] Collected: 03/01/24 1443    Lab Status: In process Specimen: Blood from Arm, Left Updated: 03/01/24 1454    Blood culture #2 [[id number]] Collected: 03/01/24 1446    Lab Status: In process Specimen: Blood from Arm, Right Updated: 03/01/24 1454                   XR chest 1 view portable   Final Result by Tone Dove MD (03/01 7674)      Left lower lobe atelectasis versus infiltrate.      Pneumonia is not excluded.      Follow-up in 4 to 6 weeks time is advised.      The study was marked in EPIC for immediate notification.            Workstation performed: [id number]         US kidney and bladder    (Results Pending)              Procedures  CriticalCare Time    Date/Time: 3/1/2024 2:40 PM    Performed by: Asim Banerjee MD  Authorized by: Asim Banerjee MD    Critical care provider statement:     Critical care time (minutes):  25    Critical care time was exclusive of:  Separately billable procedures and treating other patients    Critical care was necessary to treat or prevent imminent or life-threatening deterioration of the following conditions:  Respiratory failure    Critical care was time spent personally by me on the following activities:  Interpretation of cardiac output measurements, ordering and performing treatments and interventions, ordering and review of laboratory studies, ordering and review of radiographic studies, re-evaluation of patient's condition, review of old charts, examination of patient, evaluation of patient's response to treatment, discussions with consultants, development of treatment plan with patient or surrogate and obtaining history from patient or surrogate    I assumed direction of critical care for this patient from another provider in my specialty: no             ED Course                               SBIRT 20yo+      Flowsheet Row Most Recent Value   Initial Alcohol Screen: US AUDIT-C     1. How often do you have a drink containing  alcohol? 2 Filed at: 03/01/2024 1459   2. How many drinks containing alcohol do you have on a typical day you are drinking?  0 Filed at: 03/01/2024 1459   3a. Male UNDER 65: How often do you have five or more drinks on one occasion? 0 Filed at: 03/01/2024 1459   3b. FEMALE Any Age, or MALE 65+: How often do you have 4 or more drinks on one occassion? 0 Filed at: 03/01/2024 1459   Audit-C Score 2 Filed at: 03/01/2024 1459   COURTNEY: How many times in the past year have you...    Used an illegal drug or used a prescription medication for non-medical reasons? Never Filed at: 03/01/2024 1459                      Medical Decision Making  1404: Patient appears well, vital signs reviewed.  Patient has history of COPD but without need for oxygen use.  Patient is noted to have hypoxia in triage.  88% on room air.  She is responding to nasal cannula oxygen.  She is in respiratory distress.  Patient has also been having a viral syndrome for the past 3 days.  Benign abdominal exam, but reports anorexia and some nausea vomiting.  Plan to complete basic labs including cardiac enzymes, BNP, VBG, D-dimer, lactic acid, check EKG and chest x-ray.  Given infectious syndrome, bibasilar Rales, hypoxia, empirically started on antibiotics.  I will give DuoNeb and Solu-Medrol, reevaluate.  Patient appears dehydrated on exam, plan to rehydrate with IV fluids and reevaluate.    1500: Chest x-ray and labs reviewed.  Discussed with hospitalist for admission.    Amount and/or Complexity of Data Reviewed  Labs: ordered.  Radiology: ordered.     Details: Chest x-ray left lower lobe pneumonia  ECG/medicine tests: ordered and independent interpretation performed.     Details: Normal sinus rhythm 71 bpm, nonspecific ST abnormalities lead II, lead III and V2    Risk  Prescription drug management.  Decision regarding hospitalization.             Disposition  Final diagnoses:   Hypoxia   COPD exacerbation (HCC)   Pneumonia of left lower lobe due to  infectious organism     Time reflects when diagnosis was documented in both MDM as applicable and the Disposition within this note       Time User Action Codes Description Comment    3/1/2024  3:35 PM Asim Banerjee Add [R09.02] Hypoxia     3/1/2024  3:35 PM Asim Banerjee Add [J44.1] COPD exacerbation (HCC)     3/1/2024  3:35 PM Asim Banerjee Add [J18.9] Pneumonia of left lower lobe due to infectious organism     3/1/2024  3:35 PM Asim Banerjee Modify [R09.02] Hypoxia     3/1/2024  3:35 PM Asim Banerjee Modify [J18.9] Pneumonia of left lower lobe due to infectious organism           ED Disposition       ED Disposition   Admit    Condition   Stable    Date/Time   Fri Mar 1, 2024 8075    Comment                  Follow-up Information    None         Current Discharge Medication List        CONTINUE these medications which have NOT CHANGED    Details   aspirin (ECOTRIN LOW STRENGTH) 81 mg EC tablet Take 81 mg by mouth daily      celecoxib (CeleBREX) 200 mg capsule Take 1 capsule by mouth daily      !! Fluticasone-Umeclidin-Vilant (TRELEGY ELLIPTA IN) Inhale      metoprolol succinate (TOPROL-XL) 25 mg 24 hr tablet Take 1 tablet by mouth daily      omeprazole (PriLOSEC OTC) 20 MG tablet Take 20 mg by mouth daily      phenazopyridine (PYRIDIUM) 200 mg tablet TAKE ONE TABLET BY MOUTH THREE TIMES DAILY AS NEEDED FOR BLADDER SPASMS.      potassium chloride (K-DUR,KLOR-CON) 10 mEq tablet Take 10 mEq by mouth daily      pramipexole (MIRAPEX) 0.5 mg tablet Take 1 tablet by mouth 2 (two) times a day      rosuvastatin (CRESTOR) 5 mg tablet Take 5 mg by mouth daily      sertraline (ZOLOFT) 100 mg tablet Take 100 mg by mouth daily      telmisartan-hydrochlorothiazide (MICARDIS HCT) 80-25 MG per tablet Take 1 tablet by mouth daily      !! Trelegy Ellipta 200-62.5-25 MCG/ACT AEPB inhaler Inhale 1 puff daily      Fructooligosaccharides (FOS PO) Take 1,600 mg by mouth      nitrofurantoin (MACROBID) 100 mg capsule Take 1  capsule (100 mg total) by mouth in the morning  Qty: 90 capsule, Refills: 0    Associated Diagnoses: Urinary tract infection without hematuria, site unspecified       !! - Potential duplicate medications found. Please discuss with provider.          No discharge procedures on file.    PDMP Review         Value Time User    PDMP Reviewed  Yes 3/1/2024  4:08 PM Kalen Henderson MD            ED Provider  Electronically Signed by             Asim Banerjee MD  03/01/24 2059

## 2024-03-01 NOTE — ASSESSMENT & PLAN NOTE
Imaging shows right lower lobe pneumonia  Continue IV antibiotic, IV hydration, antiemetic, antipyretics  Respiratory protocol, follow blood culture, urine Legionella, pneumonia, procalcitonin  COVID/flu panel negative

## 2024-03-01 NOTE — ASSESSMENT & PLAN NOTE
Creatinine is 1.87  Baseline creatinine ranges between 0.9-1.0  Patient does have history of bladder cancer  Avoid nephrotoxic medication, hypotension  Continue IV hydration, retention protocol, check ultrasound of kidney bladder

## 2024-03-01 NOTE — ASSESSMENT & PLAN NOTE
Aggravated secondary to pneumonia  Continue treatment for pneumonia, inhaled nebulizer, IV steroid, respiratory protocol

## 2024-03-01 NOTE — ASSESSMENT & PLAN NOTE
Patient is saturating 86% on room air, now requiring 2 L of oxygen secondary to left lower lobe pneumonia/COPD exacerbation secondary to pneumonia  Continue supplemental oxygen, respiratory protocol, continue treatment for pneumonia and COPD  COVID/flu panel negative

## 2024-03-01 NOTE — ASSESSMENT & PLAN NOTE
Recently evaluated by  [facility], status post cystoscopy-did not reveal any evidence of recurrent bladder cancer.  Per urology, patient placed on Macrobid for recurrent UTI, continue

## 2024-03-02 LAB
ANION GAP SERPL CALCULATED.3IONS-SCNC: 13 MMOL/L
BUN SERPL-MCNC: 25 MG/DL (ref 5–25)
CALCIUM SERPL-MCNC: 8.6 MG/DL (ref 8.4–10.2)
CHLORIDE SERPL-SCNC: 100 MMOL/L (ref 96–108)
CO2 SERPL-SCNC: 27 MMOL/L (ref 21–32)
CREAT SERPL-MCNC: 1.25 MG/DL (ref 0.6–1.3)
GFR SERPL CREATININE-BSD FRML MDRD: 43 ML/MIN/1.73SQ M
GLUCOSE SERPL-MCNC: 135 MG/DL (ref 65–140)
L PNEUMO1 AG UR QL IA.RAPID: NEGATIVE
POTASSIUM SERPL-SCNC: 3.6 MMOL/L (ref 3.5–5.3)
PROCALCITONIN SERPL-MCNC: 0.08 NG/ML
S PNEUM AG UR QL: NEGATIVE
SODIUM SERPL-SCNC: 140 MMOL/L (ref 135–147)

## 2024-03-02 PROCEDURE — 94664 DEMO&/EVAL PT USE INHALER: CPT

## 2024-03-02 PROCEDURE — 80048 BASIC METABOLIC PNL TOTAL CA: CPT | Performed by: FAMILY MEDICINE

## 2024-03-02 PROCEDURE — 94760 N-INVAS EAR/PLS OXIMETRY 1: CPT

## 2024-03-02 PROCEDURE — 99232 SBSQ HOSP IP/OBS MODERATE 35: CPT | Performed by: FAMILY MEDICINE

## 2024-03-02 PROCEDURE — 94640 AIRWAY INHALATION TREATMENT: CPT

## 2024-03-02 PROCEDURE — 84145 PROCALCITONIN (PCT): CPT | Performed by: FAMILY MEDICINE

## 2024-03-02 RX ADMIN — POTASSIUM CHLORIDE 10 MEQ: 750 TABLET, EXTENDED RELEASE ORAL at 08:30

## 2024-03-02 RX ADMIN — PHENAZOPYRIDINE 100 MG: 100 TABLET ORAL at 18:05

## 2024-03-02 RX ADMIN — NITROFURANTOIN (MONOHYDRATE/MACROCRYSTALS) 100 MG: 75; 25 CAPSULE ORAL at 08:30

## 2024-03-02 RX ADMIN — SERTRALINE 100 MG: 100 TABLET, FILM COATED ORAL at 08:30

## 2024-03-02 RX ADMIN — METOPROLOL SUCCINATE 25 MG: 25 TABLET, EXTENDED RELEASE ORAL at 08:30

## 2024-03-02 RX ADMIN — GUAIFENESIN 600 MG: 600 TABLET ORAL at 08:30

## 2024-03-02 RX ADMIN — PHENAZOPYRIDINE 100 MG: 100 TABLET ORAL at 12:39

## 2024-03-02 RX ADMIN — PRAMIPEXOLE DIHYDROCHLORIDE 0.5 MG: 0.5 TABLET ORAL at 08:30

## 2024-03-02 RX ADMIN — ATORVASTATIN CALCIUM 40 MG: 40 TABLET, FILM COATED ORAL at 18:06

## 2024-03-02 RX ADMIN — LEVALBUTEROL HYDROCHLORIDE 1.25 MG: 1.25 SOLUTION RESPIRATORY (INHALATION) at 13:22

## 2024-03-02 RX ADMIN — PRAMIPEXOLE DIHYDROCHLORIDE 0.5 MG: 0.5 TABLET ORAL at 18:05

## 2024-03-02 RX ADMIN — PHENAZOPYRIDINE 100 MG: 100 TABLET ORAL at 08:30

## 2024-03-02 RX ADMIN — ASPIRIN 81 MG: 81 TABLET, COATED ORAL at 08:30

## 2024-03-02 RX ADMIN — LEVALBUTEROL HYDROCHLORIDE 1.25 MG: 1.25 SOLUTION RESPIRATORY (INHALATION) at 07:47

## 2024-03-02 RX ADMIN — SODIUM CHLORIDE, SODIUM GLUCONATE, SODIUM ACETATE, POTASSIUM CHLORIDE, MAGNESIUM CHLORIDE, SODIUM PHOSPHATE, DIBASIC, AND POTASSIUM PHOSPHATE 125 ML/HR: .53; .5; .37; .037; .03; .012; .00082 INJECTION, SOLUTION INTRAVENOUS at 12:43

## 2024-03-02 RX ADMIN — LEVALBUTEROL HYDROCHLORIDE 1.25 MG: 1.25 SOLUTION RESPIRATORY (INHALATION) at 19:27

## 2024-03-02 RX ADMIN — IPRATROPIUM BROMIDE 0.5 MG: 0.5 SOLUTION RESPIRATORY (INHALATION) at 19:27

## 2024-03-02 RX ADMIN — BUDESONIDE 0.5 MG: 0.5 INHALANT ORAL at 07:47

## 2024-03-02 RX ADMIN — IPRATROPIUM BROMIDE 0.5 MG: 0.5 SOLUTION RESPIRATORY (INHALATION) at 13:22

## 2024-03-02 RX ADMIN — IPRATROPIUM BROMIDE 0.5 MG: 0.5 SOLUTION RESPIRATORY (INHALATION) at 07:47

## 2024-03-02 RX ADMIN — HEPARIN SODIUM 5000 UNITS: 5000 INJECTION INTRAVENOUS; SUBCUTANEOUS at 14:06

## 2024-03-02 RX ADMIN — METHYLPREDNISOLONE SODIUM SUCCINATE 40 MG: 40 INJECTION, POWDER, FOR SOLUTION INTRAMUSCULAR; INTRAVENOUS at 21:18

## 2024-03-02 RX ADMIN — HEPARIN SODIUM 5000 UNITS: 5000 INJECTION INTRAVENOUS; SUBCUTANEOUS at 08:29

## 2024-03-02 RX ADMIN — DOCUSATE SODIUM 100 MG: 100 CAPSULE, LIQUID FILLED ORAL at 08:30

## 2024-03-02 RX ADMIN — GUAIFENESIN 600 MG: 600 TABLET ORAL at 18:05

## 2024-03-02 RX ADMIN — PANTOPRAZOLE SODIUM 20 MG: 20 TABLET, DELAYED RELEASE ORAL at 05:05

## 2024-03-02 RX ADMIN — SODIUM CHLORIDE, SODIUM GLUCONATE, SODIUM ACETATE, POTASSIUM CHLORIDE, MAGNESIUM CHLORIDE, SODIUM PHOSPHATE, DIBASIC, AND POTASSIUM PHOSPHATE 125 ML/HR: .53; .5; .37; .037; .03; .012; .00082 INJECTION, SOLUTION INTRAVENOUS at 04:30

## 2024-03-02 RX ADMIN — POLYETHYLENE GLYCOL 3350 17 G: 17 POWDER, FOR SOLUTION ORAL at 08:30

## 2024-03-02 RX ADMIN — CEFTRIAXONE 1000 MG: 1 INJECTION, SOLUTION INTRAVENOUS at 05:46

## 2024-03-02 RX ADMIN — NICOTINE 1 PATCH: 14 PATCH, EXTENDED RELEASE TRANSDERMAL at 08:29

## 2024-03-02 RX ADMIN — SODIUM CHLORIDE, SODIUM GLUCONATE, SODIUM ACETATE, POTASSIUM CHLORIDE, MAGNESIUM CHLORIDE, SODIUM PHOSPHATE, DIBASIC, AND POTASSIUM PHOSPHATE 125 ML/HR: .53; .5; .37; .037; .03; .012; .00082 INJECTION, SOLUTION INTRAVENOUS at 21:47

## 2024-03-02 RX ADMIN — BUDESONIDE 0.5 MG: 0.5 INHALANT ORAL at 19:27

## 2024-03-02 RX ADMIN — METHYLPREDNISOLONE SODIUM SUCCINATE 40 MG: 40 INJECTION, POWDER, FOR SOLUTION INTRAMUSCULAR; INTRAVENOUS at 08:29

## 2024-03-02 NOTE — ASSESSMENT & PLAN NOTE
Patient is saturating 86% on room air, now requiring 2 L of oxygen secondary to left lower lobe -currently using 3 L of oxygen  -pneumonia/COPD exacerbation secondary to pneumonia  Continue supplemental oxygen, respiratory protocol, continue treatment for pneumonia and COPD  COVID/flu panel negative

## 2024-03-02 NOTE — RESPIRATORY THERAPY NOTE
Patient refusing CPAP for sleep reporting  she does not tolerate a full mask due to anxiety.  She uses nasal pillows at home.  She was invited to have family bring her machine and mask to use during her hospital stay

## 2024-03-02 NOTE — RESPIRATORY THERAPY NOTE
RT Protocol Note  Moriah Palmersert 74 y.o. female MRN: [id number]  Unit/Bed#: -01 Encounter: [id number]    Assessment    Principal Problem:    Acute respiratory failure with hypoxia (HCC)  Active Problems:    Malignant neoplasm of urinary bladder, unspecified site (HCC)    Chronic obstructive pulmonary disease with acute lower respiratory infection (HCC)    CPAP (continuous positive airway pressure) dependence    Community acquired pneumonia of left lower lobe of lung    PRESTON (acute kidney injury) ([facility])      Home Pulmonary Medications:    Home Devices/Therapy: BiPAP/CPAP    Past Medical History:   Diagnosis Date    Acute UTI 2014    Arthritis 2016    Bladder cancer (HCC)     COPD (chronic obstructive pulmonary disease) ([facility]) 2016    CPAP (continuous positive airway pressure) dependence     Dependence on nicotine from cigarettes 2016    Depression 2016    Dupuytren's disease of palm 2018    Dyspnea on exertion     GERD (gastroesophageal reflux disease)     Heart murmur     Hyperlipidemia     Hypertension     Hypokalemia 2016    Left ventricular hypertrophy     Mixed stress and urge urinary incontinence     Non-rheumatic mitral regurgitation 2023    Osteoarthritis 2016    left knee    Osteopenia     Panlobular emphysema ([facility])     Recurrent UTI     RLS (restless legs syndrome) 2016    Sleep apnea     Sleep apnea 2023     Social History     Socioeconomic History    Marital status: /Civil Union     Spouse name: None    Number of children: None    Years of education: None    Highest education level: None   Occupational History    None   Tobacco Use    Smoking status: Every Day     Current packs/day: 1.00     Average packs/day: 1 pack/day for 50.0 years (50.0 ttl pk-yrs)     Types: Cigarettes    Smokeless tobacco: Never   Vaping Use    Vaping status: Never Used   Substance and Sexual Activity    Alcohol use: Yes     Alcohol/week: 2.0 standard drinks of alcohol     Types: 2 Standard drinks or equivalent per week  "    Comment: Occasionally    Drug use: Not Currently    Sexual activity: None   Other Topics Concern    None   Social History Narrative    None     Social Determinants of Health     Financial Resource Strain: Not on file   Food Insecurity: Not on file   Transportation Needs: Not on file   Physical Activity: Not on file   Stress: Not on file   Social Connections: Not on file   Intimate Partner Violence: Not on file   Housing Stability: Not on file       Subjective         Objective    Physical Exam:   Assessment Type: During-treatment  General Appearance: Alert, Awake  Respiratory Pattern: Normal  Chest Assessment: Chest expansion symmetrical  Bilateral Breath Sounds: Diminished, Expiratory wheezes  Cough: Non-productive  O2 Device: 3LPM NC    Vitals:  Blood pressure 136/72, pulse 61, temperature 97.7 °F (36.5 °C), resp. rate 15, height 5' 4\" (1.626 m), weight 90.4 kg (199 lb 6.4 oz), SpO2 91%.          Imaging and other studies: I have personally reviewed pertinent reports.      O2 Device: 3LPM NC     Plan    Respiratory Plan: Mild Distress pathway        Resp Comments: Pt stable on 3LPM NC, doing well with udn tx. Pt educated on smoking cessation and COPD.   "

## 2024-03-02 NOTE — PROGRESS NOTES
[facility]  Progress Note  Name: Moriah Phillips I  MRN: [id number]  Unit/Bed#: -01 I Date of Admission: 3/1/2024   Date of Service: 3/2/2024 I Hospital Day: 1    Assessment/Plan   PRESTON (acute kidney injury) (HCC)  Assessment & Plan  Creatinine is 1.87  Baseline creatinine ranges between 0.9-1.0  Patient does have history of bladder cancer  Avoid nephrotoxic medication, hypotension  Continue IV hydration, retention protocol, check ultrasound of kidney bladder    * Acute respiratory failure with hypoxia (HCC)  Assessment & Plan  Patient is saturating 86% on room air, now requiring 2 L of oxygen secondary to left lower lobe -currently using 3 L of oxygen  -pneumonia/COPD exacerbation secondary to pneumonia  Continue supplemental oxygen, respiratory protocol, continue treatment for pneumonia and COPD  COVID/flu panel negative    Community acquired pneumonia of left lower lobe of lung  Assessment & Plan  Imaging shows right lower lobe pneumonia  Continue IV antibiotic, IV hydration, antiemetic, antipyretics  Respiratory protocol, follow blood culture, urine Legionella, pneumonia, procalcitonin  COVID/flu panel negative    CPAP (continuous positive airway pressure) dependence  Assessment & Plan  Continue CPAP-patient refused to use CPAP.    Chronic obstructive pulmonary disease with acute lower respiratory infection (HCC)  Assessment & Plan  Aggravated secondary to pneumonia  Continue treatment for pneumonia, inhaled nebulizer, IV steroid, respiratory protocol    Malignant neoplasm of urinary bladder, unspecified site ([facility])  Assessment & Plan  Recently evaluated by [facility] urology, status post cystoscopy-did not reveal any evidence of recurrent bladder cancer.  Per urology, patient placed on Macrobid for recurrent UTI, continue  Ultrasound of kidney bladder reviewed             VTE Pharmacologic Prophylaxis: VTE Score: 7 High Risk (Score >/= 5) - Pharmacological DVT Prophylaxis  Ordered: heparin. Sequential Compression Devices Ordered.    Mobility:   Basic Mobility Inpatient Raw Score: 24  JH-HLM Goal: 8: Walk 250 feet or more  JH-HLM Achieved: 8: Walk 250 feet ot more  HLM Goal achieved. Continue to encourage appropriate mobility.    Patient Centered Rounds: I performed bedside rounds with nursing staff today.   Discussions with Specialists or Other Care Team Provider: none    Education and Discussions with Family / Patient: Updated  () at bedside.      Current Length of Stay: 1 day(s)  Current Patient Status: Inpatient   Certification Statement: The patient will continue to require additional inpatient hospital stay due to monitor above conditions  Discharge Plan: Anticipate discharge in 24-48 hrs to home.    Code Status: Level 1 - Full Code    Subjective:   Seen and evaluated during the event.  Reporting, she does not feel any taste.  But breathing is much improved.    Objective:     Vitals:   Temp (24hrs), Av.5 °F (36.4 °C), Min:97.3 °F (36.3 °C), Max:97.7 °F (36.5 °C)    Temp:  [97.3 °F (36.3 °C)-97.7 °F (36.5 °C)] 97.7 °F (36.5 °C)  HR:  [56-72] 61  Resp:  [15-22] 19  BP: ()/(50-75) 136/72  SpO2:  [86 %-93 %] 91 %  Body mass index is 34.23 kg/m².     Input and Output Summary (last 24 hours):     Intake/Output Summary (Last 24 hours) at 3/2/2024 0970  Last data filed at 3/2/2024 0947  Gross per 24 hour   Intake 2501.25 ml   Output 1000 ml   Net 1501.25 ml       Physical Exam:   Physical Exam  Vitals reviewed.   Constitutional:       Appearance: Normal appearance. She is not ill-appearing or diaphoretic.   Eyes:      General: No scleral icterus.        Left eye: No discharge.      Extraocular Movements: Extraocular movements intact.      Conjunctiva/sclera: Conjunctivae normal.      Pupils: Pupils are equal, round, and reactive to light.   Cardiovascular:      Rate and Rhythm: Normal rate.      Heart sounds: No murmur heard.     No friction rub. No gallop.    Pulmonary:      Effort: Pulmonary effort is normal. No respiratory distress.      Breath sounds: No stridor. Wheezing and rhonchi present.   Abdominal:      General: Abdomen is flat. Bowel sounds are normal. There is no distension.      Palpations: There is no mass.      Tenderness: There is no abdominal tenderness.      Hernia: No hernia is present.   Musculoskeletal:      Right lower leg: No edema.      Left lower leg: No edema.   Neurological:      General: No focal deficit present.      Mental Status: She is alert and oriented to person, place, and time.      Cranial Nerves: No cranial nerve deficit.      Sensory: No sensory deficit.      Motor: No weakness.      Coordination: Coordination normal.         Additional Data:     Labs:  Results from last 7 days   Lab Units 03/01/24  1446   WBC Thousand/uL 6.34   HEMOGLOBIN g/dL 15.0   HEMATOCRIT % 45.5   PLATELETS Thousands/uL 161   NEUTROS PCT % 62   LYMPHS PCT % 21   MONOS PCT % 13*   EOS PCT % 2     Results from last 7 days   Lab Units 03/01/24  1446   SODIUM mmol/L 136   POTASSIUM mmol/L 3.6   CHLORIDE mmol/L 97   CO2 mmol/L 27   BUN mg/dL 23   CREATININE mg/dL 1.87*   ANION GAP mmol/L 12   CALCIUM mg/dL 9.0   ALBUMIN g/dL 4.2   TOTAL BILIRUBIN mg/dL 0.55   ALK PHOS U/L 58   ALT U/L 20   AST U/L 28   GLUCOSE RANDOM mg/dL 109                 Results from last 7 days   Lab Units 03/02/24  0504 03/01/24  1756 03/01/24  1446   LACTIC ACID mmol/L  --   --  1.1   PROCALCITONIN ng/ml 0.08 0.16  --        Lines/Drains:  Invasive Devices       Peripheral Intravenous Line  Duration             Peripheral IV 03/01/24 Proximal;Right;Ventral (anterior) Forearm <1 day                          Imaging: No pertinent imaging reviewed.    Recent Cultures (last 7 days):   Results from last 7 days   Lab Units 03/01/24  1446 03/01/24  1443   BLOOD CULTURE  Received in Microbiology Lab. Culture in Progress. Received in Microbiology Lab. Culture in Progress.       Last 24 Hours  Medication List:   Current Facility-Administered Medications   Medication Dose Route Frequency Provider Last Rate    acetaminophen  650 mg Oral Q6H PRN Kalen Henderson MD      aspirin  81 mg Oral Daily Kalen Henderson MD      atorvastatin  40 mg Oral Daily With Dinner Kalen Henderson MD      budesonide  0.5 mg Nebulization Q12H Kalen Henderson MD      cefTRIAXone  1,000 mg Intravenous Q24H Kalen Henderson MD 1,000 mg (03/02/24 0546)    docusate sodium  100 mg Oral BID Kalen Henderson MD      guaiFENesin  600 mg Oral BID Kalen Henderson MD      heparin (porcine)  5,000 Units Subcutaneous Q8H [facility] Kalen Henderson MD      ipratropium  0.5 mg Nebulization TID Kalen Henderson MD      levalbuterol  1.25 mg Nebulization TID Kalen Henderson MD      methylPREDNISolone sodium succinate  40 mg Intravenous Q12H [facility] Kalen Henderson MD      metoprolol succinate  25 mg Oral Daily Kalen Henderson MD      multi-electrolyte  125 mL/hr Intravenous Continuous Kalen Henderson  mL/hr (03/02/24 0430)    nicotine  1 patch Transdermal Daily Kalen Henderson MD      nitrofurantoin  100 mg Oral Daily Kalen Henderson MD      ondansetron  4 mg Intravenous Q6H PRN Kalen Henderson MD      pantoprazole  20 mg Oral Early Morning Kalen Henderson MD      phenazopyridine  100 mg Oral TID With Meals Kalen Henderson MD      polyethylene glycol  17 g Oral Daily Kalen Henderson MD      potassium chloride  10 mEq Oral Daily Kalen Henderson MD      pramipexole  0.5 mg Oral BID Kalen Henderson MD      sertraline  100 mg Oral Daily Kalen Henderson MD          Today, Patient Was Seen By: Kalen Henderson MD    **Please Note: This note may have been constructed using a voice recognition system.**

## 2024-03-02 NOTE — ASSESSMENT & PLAN NOTE
Recently evaluated by  [facility], status post cystoscopy-did not reveal any evidence of recurrent bladder cancer.  Per urology, patient placed on Macrobid for recurrent UTI, continue  Ultrasound of kidney bladder reviewed

## 2024-03-02 NOTE — PLAN OF CARE
Problem: Potential for Falls  Goal: Patient will remain free of falls  Description: INTERVENTIONS:  - Educate patient/family on patient safety including physical limitations  - Instruct patient to call for assistance with activity   - Consult OT/PT to assist with strengthening/mobility   - Keep Call bell within reach  - Keep bed low and locked with side rails adjusted as appropriate  - Keep care items and personal belongings within reach  - Initiate and maintain comfort rounds  - Make Fall Risk Sign visible to staff  - Offer Toileting every 2 Hours, in advance of need  - Initiate/Maintain alarm  - Obtain necessary fall risk management equipment  - Apply yellow socks and bracelet for high fall risk patients  - Consider moving patient to room near nurses station  Outcome: Progressing     Problem: PAIN - ADULT  Goal: Verbalizes/displays adequate comfort level or baseline comfort level  Description: Interventions:  - Encourage patient to monitor pain and request assistance  - Assess pain using appropriate pain scale  - Administer analgesics based on type and severity of pain and evaluate response  - Implement non-pharmacological measures as appropriate and evaluate response  - Consider cultural and social influences on pain and pain management  - Notify physician/advanced practitioner if interventions unsuccessful or patient reports new pain  Outcome: Progressing     Problem: INFECTION - ADULT  Goal: Absence or prevention of progression during hospitalization  Description: INTERVENTIONS:  - Assess and monitor for signs and symptoms of infection  - Monitor lab/diagnostic results  - Monitor all insertion sites, i.e. indwelling lines, tubes, and drains  - Monitor endotracheal if appropriate and nasal secretions for changes in amount and color  - [place] appropriate cooling/warming therapies per order  - Administer medications as ordered  - Instruct and encourage patient and family to use good hand hygiene technique  -  Identify and instruct in appropriate isolation precautions for identified infection/condition  Outcome: Progressing  Goal: Absence of fever/infection during neutropenic period  Description: INTERVENTIONS:  - Monitor WBC    Outcome: Progressing     Problem: SAFETY ADULT  Goal: Patient will remain free of falls  Description: INTERVENTIONS:  - Educate patient/family on patient safety including physical limitations  - Instruct patient to call for assistance with activity   - Consult OT/PT to assist with strengthening/mobility   - Keep Call bell within reach  - Keep bed low and locked with side rails adjusted as appropriate  - Keep care items and personal belongings within reach  - Initiate and maintain comfort rounds  - Make Fall Risk Sign visible to staff  - Offer Toileting every 2 Hours, in advance of need  - Initiate/Maintain alarm  - Obtain necessary fall risk management equipment  - Apply yellow socks and bracelet for high fall risk patients  - Consider moving patient to room near nurses station  Outcome: Progressing  Goal: Maintain or return to baseline ADL function  Description: INTERVENTIONS:  -  Assess patient's ability to carry out ADLs; assess patient's baseline for ADL function and identify physical deficits which impact ability to perform ADLs (bathing, care of mouth/teeth, toileting, grooming, dressing, etc.)  - Assess/evaluate cause of self-care deficits   - Assess range of motion  - Assess patient's mobility; develop plan if impaired  - Assess patient's need for assistive devices and provide as appropriate  - Encourage maximum independence but intervene and supervise when necessary  - Involve family in performance of ADLs  - Assess for home care needs following discharge   - Consider OT consult to assist with ADL evaluation and planning for discharge  - Provide patient education as appropriate  Outcome: Progressing  Goal: Maintains/Returns to pre admission functional level  Description: INTERVENTIONS:  -  Perform AM-PAC 6 Click Basic Mobility/ Daily Activity assessment daily.  - Set and communicate daily mobility goal to care team and patient/family/caregiver.   - Collaborate with rehabilitation services on mobility goals if consulted  - Perform Range of Motion 3 times a day.  - Reposition patient every 2 hours.  - Dangle patient 3 times a day  - Stand patient 3 times a day  - Ambulate patient 3 times a day  - Out of bed to chair 3 times a day   - Out of bed for meals 3 times a day  - Out of bed for toileting  - Record patient progress and toleration of activity level   Outcome: Progressing     Problem: DISCHARGE PLANNING  Goal: Discharge to home or other facility with appropriate resources  Description: INTERVENTIONS:  - Identify barriers to discharge w/patient and caregiver  - Arrange for needed discharge resources and transportation as appropriate  - Identify discharge learning needs (meds, wound care, etc.)  - Arrange for interpretive services to assist at discharge as needed  - Refer to Case Management Department for coordinating discharge planning if the patient needs post-hospital services based on physician/advanced practitioner order or complex needs related to functional status, cognitive ability, or social support system  Outcome: Progressing     Problem: Knowledge Deficit  Goal: Patient/family/caregiver demonstrates understanding of disease process, treatment plan, medications, and discharge instructions  Description: Complete learning assessment and assess knowledge base.  Interventions:  - Provide teaching at level of understanding  - Provide teaching via preferred learning methods  Outcome: Progressing     Problem: RESPIRATORY - ADULT  Goal: Achieves optimal ventilation and oxygenation  Description: INTERVENTIONS:  - Assess for changes in respiratory status  - Assess for changes in mentation and behavior  - Position to facilitate oxygenation and minimize respiratory effort  - Oxygen administered by  appropriate delivery if ordered  - Initiate smoking cessation education as indicated  - Encourage broncho-pulmonary hygiene including cough, deep breathe, Incentive Spirometry  - Assess the need for suctioning and aspirate as needed  - Assess and instruct to report SOB or any respiratory difficulty  - Respiratory Therapy support as indicated  Outcome: Progressing

## 2024-03-02 NOTE — CASE MANAGEMENT
Case Management Assessment & Discharge Planning Note    Patient name Moriah Phillips  Location /-01 MRN [id number]  : 1950 Date 3/2/2024       Current Admission Date: 3/1/2024  Current Admission Diagnosis:Acute respiratory failure with hypoxia (HCC)   Patient Active Problem List    Diagnosis Date Noted    Acute respiratory failure with hypoxia (HCC) 2024    Community acquired pneumonia of left lower lobe of lung 2024    PRESTON (acute kidney injury) (HCC) 2024    GERD (gastroesophageal reflux disease) 2023    Hypertension 2023    Hyperlipidemia 2023    CPAP (continuous positive airway pressure) dependence 2023    Urinary urgency 10/25/2023    Dysuria 10/25/2023    Malignant neoplasm of urinary bladder, unspecified site (HCC) 10/25/2023    Chronic obstructive pulmonary disease with acute lower respiratory infection ([facility])     Arthritis 2016    RLS (restless legs syndrome) 2016    Depression       LOS (days): 1  Geometric Mean LOS (GMLOS) (days): 4.4  Days to GMLOS:3.5     OBJECTIVE:    Risk of Unplanned Readmission Score: 11.01         Current admission status: Inpatient       Preferred Pharmacy:   [address] [address]  Phone: [phone] Fax: [phone]    Primary Care Provider: Devin Singh DO    Primary Insurance: MEDICARE  Secondary Insurance:  FOR LIFE    ASSESSMENT:  Active Health Care Proxies       ToñitolilianaDevin Health Care Representative - Spouse   Primary Phone: [phone] (Mobile)                 Advance Directives  Does patient have a Health Care POA?: Yes  Does patient have Advance Directives?: Yes  Advance Directives: Living will, Power of  for health care  Primary Contact: Spouse Devin,      Patient Information  Admitted from:: Home  Mental Status: Alert  During Assessment patient was accompanied by: Spouse  Assessment  information provided by:: Patient, Spouse  Primary Caregiver: Self  Support Systems: Spouse/significant other, Self, Daughter  What city do you live in?: [place], [place]  Home entry access options. Select all that apply.: Stairs  Number of steps to enter home.: 1  Type of Current Residence: Bi-level  Upon entering residence, is there a bedroom on the main floor (no further steps)?: No  A bedroom is located on the following floor levels of residence (select all that apply):: 2nd Floor (7 steps)  Upon entering residence, is there a bathroom on the main floor (no further steps)?: No  Indicate which floors of current residence have a bathroom (select all the apply):: 2nd Floor  Number of steps to 2nd floor from main floor: 7  Living Arrangements: Lives w/ Spouse/significant other  Is patient a ?: No    Activities of Daily Living Prior to Admission  Functional Status: Independent  Completes ADLs independently?: Yes  Ambulates independently?: Yes  Does patient use assisted devices?: No  Does patient currently own DME?: Yes  What DME does the patient currently own?: CPAP  Does patient have a history of Outpatient Therapy (PT/OT)?: No  Does the patient have a history of Short-Term Rehab?: No  Does patient have a history of HHC?: No  Does patient currently have HHC?: No    Patient Information Continued  Income Source: Pension/MCC  Does patient have prescription coverage?: Yes  Does patient receive dialysis treatments?: No  Does patient have a history of substance abuse?: No  Does patient have a history of Mental Health Diagnosis?: No    Means of Transportation  Means of Transport to Appts:: Drives Self      Social Determinants of Health (SDOH)      Flowsheet Row Most Recent Value   Housing Stability    In the last 12 months, was there a time when you were not able to pay the mortgage or rent on time? N   In the last 12 months, how many places have you lived? 1   In the last 12 months, was there a time when you  did not have a steady place to sleep or slept in a shelter (including now)? N   Transportation Needs    In the past 12 months, has lack of transportation kept you from meetings, work, or from getting things needed for daily living? No   Food Insecurity    Within the past 12 months, you worried that your food would run out before you got the money to buy more. Never true   Within the past 12 months, the food you bought just didn't last and you didn't have money to get more. Never true   Utilities    In the past 12 months has the electric, gas, oil, or water company threatened to shut off services in your home? No            DISCHARGE DETAILS:    Met with patient and spouse at bedside.  Prior to admission, patient independent with ADL's, driving.  Spouse supportive, also drives.  No prior hx of HH or SNF.  Has CPAP only.   Patient anticipates dc home with no new CM needs.  Did inquire about qualifying for oxygen.      DCP: Follow for any home oxygen needs.  Likely dc home with support of spouse.      Discharge planning discussed with:: Patient, spouse Devin RAGLAND contacted family/caregiver?: Yes  Were Treatment Team discharge recommendations reviewed with patient/caregiver?: Yes  Did patient/caregiver verbalize understanding of patient care needs?: Yes  Were patient/caregiver advised of the risks associated with not following Treatment Team discharge recommendations?: Yes    Contacts  Patient Contacts: Devin  Relationship to Patient:: Family  Contact Method: In Person  Reason/Outcome: Continuity of Care, Emergency Contact, Discharge Planning    Requested Home Health Care         Is the patient interested in HHC at discharge?: No     Transport at Discharge : Family

## 2024-03-02 NOTE — PLAN OF CARE
Problem: Potential for Falls  Goal: Patient will remain free of falls  Description: INTERVENTIONS:  - Educate patient/family on patient safety including physical limitations  - Instruct patient to call for assistance with activity   - Consult OT/PT to assist with strengthening/mobility   - Keep Call bell within reach  - Keep bed low and locked with side rails adjusted as appropriate  - Keep care items and personal belongings within reach  - Initiate and maintain comfort rounds  - Make Fall Risk Sign visible to staff    - Apply yellow socks and bracelet for high fall risk patients  - Consider moving patient to room near nurses station  Outcome: Progressing     Problem: PAIN - ADULT  Goal: Verbalizes/displays adequate comfort level or baseline comfort level  Description: Interventions:  - Encourage patient to monitor pain and request assistance  - Assess pain using appropriate pain scale  - Administer analgesics based on type and severity of pain and evaluate response  - Implement non-pharmacological measures as appropriate and evaluate response  - Consider cultural and social influences on pain and pain management  - Notify physician/advanced practitioner if interventions unsuccessful or patient reports new pain  Outcome: Progressing     Problem: INFECTION - ADULT  Goal: Absence or prevention of progression during hospitalization  Description: INTERVENTIONS:  - Assess and monitor for signs and symptoms of infection  - Monitor lab/diagnostic results  - Monitor all insertion sites, i.e. indwelling lines, tubes, and drains  - Monitor endotracheal if appropriate and nasal secretions for changes in amount and color  - [place] appropriate cooling/warming therapies per order  - Administer medications as ordered  - Instruct and encourage patient and family to use good hand hygiene technique  - Identify and instruct in appropriate isolation precautions for identified infection/condition  Outcome: Progressing     Problem:  SAFETY ADULT  Goal: Patient will remain free of falls  Description: INTERVENTIONS:  - Educate patient/family on patient safety including physical limitations  - Instruct patient to call for assistance with activity   - Consult OT/PT to assist with strengthening/mobility   - Keep Call bell within reach  - Keep bed low and locked with side rails adjusted as appropriate  - Keep care items and personal belongings within reach  - Initiate and maintain comfort rounds  - Make Fall Risk Sign visible to staff    - Apply yellow socks and bracelet for high fall risk patients  - Consider moving patient to room near nurses station  Outcome: Progressing  Goal: Maintain or return to baseline ADL function  Description: INTERVENTIONS:  -  Assess patient's ability to carry out ADLs; assess patient's baseline for ADL function and identify physical deficits which impact ability to perform ADLs (bathing, care of mouth/teeth, toileting, grooming, dressing, etc.)  - Assess/evaluate cause of self-care deficits   - Assess range of motion  - Assess patient's mobility; develop plan if impaired  - Assess patient's need for assistive devices and provide as appropriate  - Encourage maximum independence but intervene and supervise when necessary  - Involve family in performance of ADLs  - Assess for home care needs following discharge   - Consider OT consult to assist with ADL evaluation and planning for discharge  - Provide patient education as appropriate  Outcome: Progressing  Goal: Maintains/Returns to pre admission functional level  Description: INTERVENTIONS:  - Perform AM-PAC 6 Click Basic Mobility/ Daily Activity assessment daily.  - Set and communicate daily mobility goal to care team and patient/family/caregiver.   - Collaborate with rehabilitation services on mobility goals if consulted    - Out of bed for toileting  - Record patient progress and toleration of activity level   Outcome: Progressing     Problem: DISCHARGE PLANNING  Goal:  Discharge to home or other facility with appropriate resources  Description: INTERVENTIONS:  - Identify barriers to discharge w/patient and caregiver  - Arrange for needed discharge resources and transportation as appropriate  - Identify discharge learning needs (meds, wound care, etc.)  - Arrange for interpretive services to assist at discharge as needed  - Refer to Case Management Department for coordinating discharge planning if the patient needs post-hospital services based on physician/advanced practitioner order or complex needs related to functional status, cognitive ability, or social support system  Outcome: Progressing     Problem: Knowledge Deficit  Goal: Patient/family/caregiver demonstrates understanding of disease process, treatment plan, medications, and discharge instructions  Description: Complete learning assessment and assess knowledge base.  Interventions:  - Provide teaching at level of understanding  - Provide teaching via preferred learning methods  Outcome: Progressing     Problem: RESPIRATORY - ADULT  Goal: Achieves optimal ventilation and oxygenation  Description: INTERVENTIONS:  - Assess for changes in respiratory status  - Assess for changes in mentation and behavior  - Position to facilitate oxygenation and minimize respiratory effort  - Oxygen administered by appropriate delivery if ordered  - Initiate smoking cessation education as indicated  - Encourage broncho-pulmonary hygiene including cough, deep breathe, Incentive Spirometry  - Assess the need for suctioning and aspirate as needed  - Assess and instruct to report SOB or any respiratory difficulty  - Respiratory Therapy support as indicated  Outcome: Progressing

## 2024-03-03 ENCOUNTER — APPOINTMENT (INPATIENT)
Dept: CT IMAGING | Facility: HOSPITAL | Age: 74
DRG: 682 | End: 2024-03-03
Payer: MEDICARE

## 2024-03-03 PROBLEM — N30.00 ACUTE CYSTITIS: Status: ACTIVE | Noted: 2024-03-03

## 2024-03-03 PROCEDURE — 94760 N-INVAS EAR/PLS OXIMETRY 1: CPT

## 2024-03-03 PROCEDURE — 71275 CT ANGIOGRAPHY CHEST: CPT

## 2024-03-03 PROCEDURE — 94664 DEMO&/EVAL PT USE INHALER: CPT

## 2024-03-03 PROCEDURE — 99232 SBSQ HOSP IP/OBS MODERATE 35: CPT | Performed by: FAMILY MEDICINE

## 2024-03-03 PROCEDURE — 94640 AIRWAY INHALATION TREATMENT: CPT

## 2024-03-03 RX ORDER — LEVALBUTEROL INHALATION SOLUTION 1.25 MG/3ML
1.25 SOLUTION RESPIRATORY (INHALATION)
Status: DISCONTINUED | OUTPATIENT
Start: 2024-03-04 | End: 2024-03-06

## 2024-03-03 RX ORDER — LEVALBUTEROL INHALATION SOLUTION 1.25 MG/3ML
1.25 SOLUTION RESPIRATORY (INHALATION) EVERY 4 HOURS
Status: DISCONTINUED | OUTPATIENT
Start: 2024-03-03 | End: 2024-03-03

## 2024-03-03 RX ADMIN — BUDESONIDE 0.5 MG: 0.5 INHALANT ORAL at 19:33

## 2024-03-03 RX ADMIN — PRAMIPEXOLE DIHYDROCHLORIDE 0.5 MG: 0.5 TABLET ORAL at 17:20

## 2024-03-03 RX ADMIN — LEVALBUTEROL HYDROCHLORIDE 1.25 MG: 1.25 SOLUTION RESPIRATORY (INHALATION) at 19:34

## 2024-03-03 RX ADMIN — PHENAZOPYRIDINE 100 MG: 100 TABLET ORAL at 11:41

## 2024-03-03 RX ADMIN — IPRATROPIUM BROMIDE 0.5 MG: 0.5 SOLUTION RESPIRATORY (INHALATION) at 13:00

## 2024-03-03 RX ADMIN — PHENAZOPYRIDINE 100 MG: 100 TABLET ORAL at 15:53

## 2024-03-03 RX ADMIN — SODIUM CHLORIDE, SODIUM GLUCONATE, SODIUM ACETATE, POTASSIUM CHLORIDE, MAGNESIUM CHLORIDE, SODIUM PHOSPHATE, DIBASIC, AND POTASSIUM PHOSPHATE 125 ML/HR: .53; .5; .37; .037; .03; .012; .00082 INJECTION, SOLUTION INTRAVENOUS at 05:17

## 2024-03-03 RX ADMIN — PRAMIPEXOLE DIHYDROCHLORIDE 0.5 MG: 0.5 TABLET ORAL at 08:45

## 2024-03-03 RX ADMIN — CEFTRIAXONE 1000 MG: 1 INJECTION, SOLUTION INTRAVENOUS at 06:09

## 2024-03-03 RX ADMIN — DOCUSATE SODIUM 100 MG: 100 CAPSULE, LIQUID FILLED ORAL at 17:20

## 2024-03-03 RX ADMIN — HEPARIN SODIUM 5000 UNITS: 5000 INJECTION INTRAVENOUS; SUBCUTANEOUS at 15:52

## 2024-03-03 RX ADMIN — BUDESONIDE 0.5 MG: 0.5 INHALANT ORAL at 07:39

## 2024-03-03 RX ADMIN — POLYETHYLENE GLYCOL 3350 17 G: 17 POWDER, FOR SOLUTION ORAL at 08:45

## 2024-03-03 RX ADMIN — LEVALBUTEROL HYDROCHLORIDE 1.25 MG: 1.25 SOLUTION RESPIRATORY (INHALATION) at 13:00

## 2024-03-03 RX ADMIN — ATORVASTATIN CALCIUM 40 MG: 40 TABLET, FILM COATED ORAL at 15:53

## 2024-03-03 RX ADMIN — METHYLPREDNISOLONE SODIUM SUCCINATE 40 MG: 40 INJECTION, POWDER, FOR SOLUTION INTRAMUSCULAR; INTRAVENOUS at 20:47

## 2024-03-03 RX ADMIN — ASPIRIN 81 MG: 81 TABLET, COATED ORAL at 08:45

## 2024-03-03 RX ADMIN — IPRATROPIUM BROMIDE 0.5 MG: 0.5 SOLUTION RESPIRATORY (INHALATION) at 19:34

## 2024-03-03 RX ADMIN — METOPROLOL SUCCINATE 25 MG: 25 TABLET, EXTENDED RELEASE ORAL at 08:45

## 2024-03-03 RX ADMIN — GUAIFENESIN 600 MG: 600 TABLET ORAL at 17:19

## 2024-03-03 RX ADMIN — PANTOPRAZOLE SODIUM 20 MG: 20 TABLET, DELAYED RELEASE ORAL at 05:15

## 2024-03-03 RX ADMIN — POTASSIUM CHLORIDE 10 MEQ: 750 TABLET, EXTENDED RELEASE ORAL at 08:45

## 2024-03-03 RX ADMIN — DOCUSATE SODIUM 100 MG: 100 CAPSULE, LIQUID FILLED ORAL at 08:45

## 2024-03-03 RX ADMIN — HEPARIN SODIUM 5000 UNITS: 5000 INJECTION INTRAVENOUS; SUBCUTANEOUS at 08:45

## 2024-03-03 RX ADMIN — NICOTINE 1 PATCH: 14 PATCH, EXTENDED RELEASE TRANSDERMAL at 08:47

## 2024-03-03 RX ADMIN — PHENAZOPYRIDINE 100 MG: 100 TABLET ORAL at 08:50

## 2024-03-03 RX ADMIN — NITROFURANTOIN (MONOHYDRATE/MACROCRYSTALS) 100 MG: 75; 25 CAPSULE ORAL at 08:45

## 2024-03-03 RX ADMIN — SERTRALINE 100 MG: 100 TABLET, FILM COATED ORAL at 08:45

## 2024-03-03 RX ADMIN — LEVALBUTEROL HYDROCHLORIDE 1.25 MG: 1.25 SOLUTION RESPIRATORY (INHALATION) at 07:39

## 2024-03-03 RX ADMIN — IPRATROPIUM BROMIDE 0.5 MG: 0.5 SOLUTION RESPIRATORY (INHALATION) at 07:39

## 2024-03-03 RX ADMIN — METHYLPREDNISOLONE SODIUM SUCCINATE 40 MG: 40 INJECTION, POWDER, FOR SOLUTION INTRAMUSCULAR; INTRAVENOUS at 08:45

## 2024-03-03 RX ADMIN — GUAIFENESIN 600 MG: 600 TABLET ORAL at 08:45

## 2024-03-03 RX ADMIN — IOHEXOL 85 ML: 350 INJECTION, SOLUTION INTRAVENOUS at 09:45

## 2024-03-03 NOTE — ASSESSMENT & PLAN NOTE
Recently evaluated by  [facility], status post cystoscopy-did not reveal any evidence of recurrent bladder cancer.  Per urology, patient placed on Macrobid for recurrent UTI, continue  Ultrasound of kidney bladder reviewed

## 2024-03-03 NOTE — ASSESSMENT & PLAN NOTE
Aggravated secondary to pneumonia  Continue treatment for pneumonia, inhaled nebulizer, IV steroid, respiratory protocol  Check CTA PE

## 2024-03-03 NOTE — PROGRESS NOTES
[facility]  Progress Note  Name: Moriah Phillips I  MRN: [id number]  Unit/Bed#: -01 I Date of Admission: 3/1/2024   Date of Service: 3/3/2024 I Hospital Day: 2    Assessment/Plan   PRESTON (acute kidney injury) (HCC)  Assessment & Plan  Creatinine is 1.87  Baseline creatinine ranges between 0.9-1.0  Resolved    * Acute respiratory failure with hypoxia (HCC)  Assessment & Plan  Patient is saturating 86% on room air, now requiring 2 L of oxygen secondary to left lower lobe -currently using 3 L of oxygen  -pneumonia/COPD exacerbation secondary to pneumonia  Continue supplemental oxygen, respiratory protocol, continue treatment for pneumonia and COPD  COVID/flu panel negative  Check CT PE at this time since patient is still using 3 L of oxygen.    Acute cystitis  Assessment & Plan  Continue IV antibiotic    Community acquired pneumonia of left lower lobe of lung  Assessment & Plan  Imaging shows right lower lobe pneumonia  Continue IV antibiotic, IV hydration, antiemetic, antipyretics  Respiratory protocol, follow blood culture, urine Legionella, pneumonia, procalcitonin  COVID/flu panel negative    CPAP (continuous positive airway pressure) dependence  Assessment & Plan  Continue CPAP-patient refused to use CPAP.    Chronic obstructive pulmonary disease with acute lower respiratory infection (HCC)  Assessment & Plan  Aggravated secondary to pneumonia  Continue treatment for pneumonia, inhaled nebulizer, IV steroid, respiratory protocol  Check CTA PE    Malignant neoplasm of urinary bladder, unspecified site (HCC)  Assessment & Plan  Recently evaluated by [facility] urology, status post cystoscopy-did not reveal any evidence of recurrent bladder cancer.  Per urology, patient placed on Macrobid for recurrent UTI, continue  Ultrasound of kidney bladder reviewed             VTE Pharmacologic Prophylaxis: VTE Score: 7 High Risk (Score >/= 5) - Pharmacological DVT Prophylaxis Ordered: heparin.  Sequential Compression Devices Ordered.    Mobility:   Basic Mobility Inpatient Raw Score: 24  JH-HLM Goal: 8: Walk 250 feet or more  JH-HLM Achieved: 8: Walk 250 feet ot more  HLM Goal achieved. Continue to encourage appropriate mobility.    Patient Centered Rounds: I performed bedside rounds with nursing staff today.   Discussions with Specialists or Other Care Team Provider: None    Education and Discussions with Family / Patient: Updated  (daughter) at bedside.      Current Length of Stay: 2 day(s)  Current Patient Status: Inpatient   Certification Statement: The patient will continue to require additional inpatient hospital stay due to monitor above conditions  Discharge Plan: Anticipate discharge in 24-48 hrs to home.    Code Status: Level 1 - Full Code    Subjective:   Seen and evaluated during the run.  Still wheezing.    Objective:     Vitals:   Temp (24hrs), Av.8 °F (36.6 °C), Min:97.5 °F (36.4 °C), Max:98.2 °F (36.8 °C)    Temp:  [97.5 °F (36.4 °C)-98.2 °F (36.8 °C)] 97.5 °F (36.4 °C)  HR:  [60-79] 79  Resp:  [18] 18  BP: (119-127)/(55-79) 127/79  SpO2:  [90 %-94 %] 92 %  Body mass index is 34.23 kg/m².     Input and Output Summary (last 24 hours):     Intake/Output Summary (Last 24 hours) at 3/3/2024 0929  Last data filed at 3/3/2024 0857  Gross per 24 hour   Intake 2737.92 ml   Output 2200 ml   Net 537.92 ml       Physical Exam:   Physical Exam  Vitals and nursing note reviewed.   Constitutional:       Appearance: Normal appearance. She is not ill-appearing or diaphoretic.   Eyes:      General: No scleral icterus.        Left eye: No discharge.      Extraocular Movements: Extraocular movements intact.      Conjunctiva/sclera: Conjunctivae normal.      Pupils: Pupils are equal, round, and reactive to light.   Cardiovascular:      Rate and Rhythm: Normal rate.      Heart sounds: No murmur heard.     No friction rub. No gallop.   Pulmonary:      Effort: Pulmonary effort is normal.       Breath sounds: Wheezing present. No rhonchi or rales.   Abdominal:      General: Abdomen is flat. Bowel sounds are normal. There is no distension.      Palpations: There is no mass.      Tenderness: There is no abdominal tenderness.      Hernia: No hernia is present.   Musculoskeletal:      Right lower leg: No edema.      Left lower leg: No edema.   Skin:     General: Skin is warm.   Neurological:      General: No focal deficit present.      Mental Status: She is alert and oriented to person, place, and time.      Cranial Nerves: No cranial nerve deficit.      Sensory: No sensory deficit.      Motor: No weakness.      Coordination: Coordination normal.         Additional Data:     Labs:  Results from last 7 days   Lab Units 03/01/24  1446   WBC Thousand/uL 6.34   HEMOGLOBIN g/dL 15.0   HEMATOCRIT % 45.5   PLATELETS Thousands/uL 161   NEUTROS PCT % 62   LYMPHS PCT % 21   MONOS PCT % 13*   EOS PCT % 2     Results from last 7 days   Lab Units 03/02/24  0504 03/01/24  1446   SODIUM mmol/L 140 136   POTASSIUM mmol/L 3.6 3.6   CHLORIDE mmol/L 100 97   CO2 mmol/L 27 27   BUN mg/dL 25 23   CREATININE mg/dL 1.25 1.87*   ANION GAP mmol/L 13 12   CALCIUM mg/dL 8.6 9.0   ALBUMIN g/dL  --  4.2   TOTAL BILIRUBIN mg/dL  --  0.55   ALK PHOS U/L  --  58   ALT U/L  --  20   AST U/L  --  28   GLUCOSE RANDOM mg/dL 135 109                 Results from last 7 days   Lab Units 03/02/24  0504 03/01/24  1756 03/01/24  1446   LACTIC ACID mmol/L  --   --  1.1   PROCALCITONIN ng/ml 0.08 0.16  --        Lines/Drains:  Invasive Devices       Peripheral Intravenous Line  Duration             Peripheral IV 03/01/24 Proximal;Right;Ventral (anterior) Forearm 1 day              Drain  Duration             External Urinary Catheter <1 day                          Imaging: Reviewed radiology reports from this admission including: chest CT scan    Recent Cultures (last 7 days):   Results from last 7 days   Lab Units 03/01/24  2034 03/01/24  1446  03/01/24  1443   BLOOD CULTURE   --  No Growth at 24 hrs. No Growth at 24 hrs.   LEGIONELLA URINARY ANTIGEN  Negative  --   --        Last 24 Hours Medication List:   Current Facility-Administered Medications   Medication Dose Route Frequency Provider Last Rate    acetaminophen  650 mg Oral Q6H PRN Kalen Henderson MD      aspirin  81 mg Oral Daily Kalen Henderson MD      atorvastatin  40 mg Oral Daily With Dinner Kalen Henderson MD      budesonide  0.5 mg Nebulization Q12H Kalen Henderson MD      cefTRIAXone  1,000 mg Intravenous Q24H Kalen Henderson MD 1,000 mg (03/03/24 0609)    docusate sodium  100 mg Oral BID Kalen Henderson MD      guaiFENesin  600 mg Oral BID Kalen Henderson MD      heparin (porcine)  5,000 Units Subcutaneous Q8H [facility] Kalen Henderson MD      ipratropium  0.5 mg Nebulization TID Kalen Henderson MD      levalbuterol  1.25 mg Nebulization TID Kalen Henderson MD      methylPREDNISolone sodium succinate  40 mg Intravenous Q12H [facility] Kalen Henderson MD      metoprolol succinate  25 mg Oral Daily Kalen Henderson MD      nicotine  1 patch Transdermal Daily Kalen Henderson MD      nitrofurantoin  100 mg Oral Daily Kalen Henderson MD      ondansetron  4 mg Intravenous Q6H PRN Kalen Henderson MD      pantoprazole  20 mg Oral Early Morning Kalen Henderson MD      phenazopyridine  100 mg Oral TID With Meals Kalen Henderson MD      polyethylene glycol  17 g Oral Daily Kalen Henderson MD      potassium chloride  10 mEq Oral Daily Kalen Henderson MD      pramipexole  0.5 mg Oral BID Kalen Henderson MD      sertraline  100 mg Oral Daily Kalen Henderson MD          Today, Patient Was Seen By: Kalen Henderson MD    **Please Note: This note may have been constructed using a voice recognition system.**

## 2024-03-03 NOTE — ASSESSMENT & PLAN NOTE
Patient is saturating 86% on room air, now requiring 2 L of oxygen secondary to left lower lobe -currently using 3 L of oxygen  -pneumonia/COPD exacerbation secondary to pneumonia  Continue supplemental oxygen, respiratory protocol, continue treatment for pneumonia and COPD  COVID/flu panel negative  Check CT PE at this time since patient is still using 3 L of oxygen.

## 2024-03-03 NOTE — PLAN OF CARE
Problem: Potential for Falls  Goal: Patient will remain free of falls  Description: INTERVENTIONS:  - Educate patient/family on patient safety including physical limitations  - Instruct patient to call for assistance with activity   - Consult OT/PT to assist with strengthening/mobility   - Keep Call bell within reach  - Keep bed low and locked with side rails adjusted as appropriate  - Keep care items and personal belongings within reach  - Initiate and maintain comfort rounds  - Make Fall Risk Sign visible to staff    - Apply yellow socks and bracelet for high fall risk patients  - Consider moving patient to room near nurses station  Outcome: Progressing     Problem: PAIN - ADULT  Goal: Verbalizes/displays adequate comfort level or baseline comfort level  Description: Interventions:  - Encourage patient to monitor pain and request assistance  - Assess pain using appropriate pain scale  - Administer analgesics based on type and severity of pain and evaluate response  - Implement non-pharmacological measures as appropriate and evaluate response  - Consider cultural and social influences on pain and pain management  - Notify physician/advanced practitioner if interventions unsuccessful or patient reports new pain  Outcome: Progressing     Problem: INFECTION - ADULT  Goal: Absence or prevention of progression during hospitalization  Description: INTERVENTIONS:  - Assess and monitor for signs and symptoms of infection  - Monitor lab/diagnostic results  - Monitor all insertion sites, i.e. indwelling lines, tubes, and drains  - Monitor endotracheal if appropriate and nasal secretions for changes in amount and color  - [place] appropriate cooling/warming therapies per order  - Administer medications as ordered  - Instruct and encourage patient and family to use good hand hygiene technique  - Identify and instruct in appropriate isolation precautions for identified infection/condition  Outcome: Progressing     Problem:  SAFETY ADULT  Goal: Patient will remain free of falls  Description: INTERVENTIONS:  - Educate patient/family on patient safety including physical limitations  - Instruct patient to call for assistance with activity   - Consult OT/PT to assist with strengthening/mobility   - Keep Call bell within reach  - Keep bed low and locked with side rails adjusted as appropriate  - Keep care items and personal belongings within reach  - Initiate and maintain comfort rounds  - Make Fall Risk Sign visible to staff    - Apply yellow socks and bracelet for high fall risk patients  - Consider moving patient to room near nurses station  Outcome: Progressing  Goal: Maintain or return to baseline ADL function  Description: INTERVENTIONS:  -  Assess patient's ability to carry out ADLs; assess patient's baseline for ADL function and identify physical deficits which impact ability to perform ADLs (bathing, care of mouth/teeth, toileting, grooming, dressing, etc.)  - Assess/evaluate cause of self-care deficits   - Assess range of motion  - Assess patient's mobility; develop plan if impaired  - Assess patient's need for assistive devices and provide as appropriate  - Encourage maximum independence but intervene and supervise when necessary  - Involve family in performance of ADLs  - Assess for home care needs following discharge   - Consider OT consult to assist with ADL evaluation and planning for discharge  - Provide patient education as appropriate  Outcome: Progressing  Goal: Maintains/Returns to pre admission functional level  Description: INTERVENTIONS:  - Perform AM-PAC 6 Click Basic Mobility/ Daily Activity assessment daily.  - Set and communicate daily mobility goal to care team and patient/family/caregiver.   - Collaborate with rehabilitation services on mobility goals if consulted    - Out of bed for toileting  - Record patient progress and toleration of activity level   Outcome: Progressing     Problem: DISCHARGE PLANNING  Goal:  Discharge to home or other facility with appropriate resources  Description: INTERVENTIONS:  - Identify barriers to discharge w/patient and caregiver  - Arrange for needed discharge resources and transportation as appropriate  - Identify discharge learning needs (meds, wound care, etc.)  - Arrange for interpretive services to assist at discharge as needed  - Refer to Case Management Department for coordinating discharge planning if the patient needs post-hospital services based on physician/advanced practitioner order or complex needs related to functional status, cognitive ability, or social support system  Outcome: Progressing     Problem: Knowledge Deficit  Goal: Patient/family/caregiver demonstrates understanding of disease process, treatment plan, medications, and discharge instructions  Description: Complete learning assessment and assess knowledge base.  Interventions:  - Provide teaching at level of understanding  - Provide teaching via preferred learning methods  Outcome: Progressing     Problem: RESPIRATORY - ADULT  Goal: Achieves optimal ventilation and oxygenation  Description: INTERVENTIONS:  - Assess for changes in respiratory status  - Assess for changes in mentation and behavior  - Position to facilitate oxygenation and minimize respiratory effort  - Oxygen administered by appropriate delivery if ordered  - Initiate smoking cessation education as indicated  - Encourage broncho-pulmonary hygiene including cough, deep breathe, Incentive Spirometry  - Assess the need for suctioning and aspirate as needed  - Assess and instruct to report SOB or any respiratory difficulty  - Respiratory Therapy support as indicated  Outcome: Progressing

## 2024-03-04 LAB
ANION GAP SERPL CALCULATED.3IONS-SCNC: 7 MMOL/L
BACTERIA UR CULT: ABNORMAL
BACTERIA UR CULT: ABNORMAL
BUN SERPL-MCNC: 20 MG/DL (ref 5–25)
CALCIUM SERPL-MCNC: 9.3 MG/DL (ref 8.4–10.2)
CHLORIDE SERPL-SCNC: 101 MMOL/L (ref 96–108)
CO2 SERPL-SCNC: 32 MMOL/L (ref 21–32)
CREAT SERPL-MCNC: 0.79 MG/DL (ref 0.6–1.3)
GFR SERPL CREATININE-BSD FRML MDRD: 75 ML/MIN/1.73SQ M
GLUCOSE SERPL-MCNC: 141 MG/DL (ref 65–140)
POTASSIUM SERPL-SCNC: 3.8 MMOL/L (ref 3.5–5.3)
SODIUM SERPL-SCNC: 140 MMOL/L (ref 135–147)

## 2024-03-04 PROCEDURE — 99232 SBSQ HOSP IP/OBS MODERATE 35: CPT | Performed by: FAMILY MEDICINE

## 2024-03-04 PROCEDURE — 94640 AIRWAY INHALATION TREATMENT: CPT

## 2024-03-04 PROCEDURE — 94760 N-INVAS EAR/PLS OXIMETRY 1: CPT

## 2024-03-04 PROCEDURE — 80048 BASIC METABOLIC PNL TOTAL CA: CPT | Performed by: FAMILY MEDICINE

## 2024-03-04 RX ADMIN — NITROFURANTOIN (MONOHYDRATE/MACROCRYSTALS) 100 MG: 75; 25 CAPSULE ORAL at 08:31

## 2024-03-04 RX ADMIN — METHYLPREDNISOLONE SODIUM SUCCINATE 40 MG: 40 INJECTION, POWDER, FOR SOLUTION INTRAMUSCULAR; INTRAVENOUS at 20:06

## 2024-03-04 RX ADMIN — PANTOPRAZOLE SODIUM 20 MG: 20 TABLET, DELAYED RELEASE ORAL at 05:56

## 2024-03-04 RX ADMIN — HEPARIN SODIUM 5000 UNITS: 5000 INJECTION INTRAVENOUS; SUBCUTANEOUS at 17:13

## 2024-03-04 RX ADMIN — PRAMIPEXOLE DIHYDROCHLORIDE 0.5 MG: 0.5 TABLET ORAL at 08:31

## 2024-03-04 RX ADMIN — GUAIFENESIN 600 MG: 600 TABLET ORAL at 17:13

## 2024-03-04 RX ADMIN — LEVALBUTEROL HYDROCHLORIDE 1.25 MG: 1.25 SOLUTION RESPIRATORY (INHALATION) at 13:39

## 2024-03-04 RX ADMIN — IPRATROPIUM BROMIDE 0.5 MG: 0.5 SOLUTION RESPIRATORY (INHALATION) at 13:39

## 2024-03-04 RX ADMIN — METHYLPREDNISOLONE SODIUM SUCCINATE 40 MG: 40 INJECTION, POWDER, FOR SOLUTION INTRAMUSCULAR; INTRAVENOUS at 08:32

## 2024-03-04 RX ADMIN — HEPARIN SODIUM 5000 UNITS: 5000 INJECTION INTRAVENOUS; SUBCUTANEOUS at 08:32

## 2024-03-04 RX ADMIN — PRAMIPEXOLE DIHYDROCHLORIDE 0.5 MG: 0.5 TABLET ORAL at 17:13

## 2024-03-04 RX ADMIN — POLYETHYLENE GLYCOL 3350 17 G: 17 POWDER, FOR SOLUTION ORAL at 08:32

## 2024-03-04 RX ADMIN — PHENAZOPYRIDINE 100 MG: 100 TABLET ORAL at 08:31

## 2024-03-04 RX ADMIN — POTASSIUM CHLORIDE 10 MEQ: 750 TABLET, EXTENDED RELEASE ORAL at 08:31

## 2024-03-04 RX ADMIN — ATORVASTATIN CALCIUM 40 MG: 40 TABLET, FILM COATED ORAL at 17:13

## 2024-03-04 RX ADMIN — CEFTRIAXONE 1000 MG: 1 INJECTION, SOLUTION INTRAVENOUS at 06:22

## 2024-03-04 RX ADMIN — PHENAZOPYRIDINE 100 MG: 100 TABLET ORAL at 17:13

## 2024-03-04 RX ADMIN — LEVALBUTEROL HYDROCHLORIDE 1.25 MG: 1.25 SOLUTION RESPIRATORY (INHALATION) at 07:38

## 2024-03-04 RX ADMIN — NICOTINE 1 PATCH: 14 PATCH, EXTENDED RELEASE TRANSDERMAL at 08:33

## 2024-03-04 RX ADMIN — SERTRALINE 100 MG: 100 TABLET, FILM COATED ORAL at 08:31

## 2024-03-04 RX ADMIN — IPRATROPIUM BROMIDE 0.5 MG: 0.5 SOLUTION RESPIRATORY (INHALATION) at 20:14

## 2024-03-04 RX ADMIN — HEPARIN SODIUM 5000 UNITS: 5000 INJECTION INTRAVENOUS; SUBCUTANEOUS at 01:08

## 2024-03-04 RX ADMIN — BUDESONIDE 0.5 MG: 0.5 INHALANT ORAL at 20:14

## 2024-03-04 RX ADMIN — DOCUSATE SODIUM 100 MG: 100 CAPSULE, LIQUID FILLED ORAL at 08:31

## 2024-03-04 RX ADMIN — PHENAZOPYRIDINE 100 MG: 100 TABLET ORAL at 13:05

## 2024-03-04 RX ADMIN — DOCUSATE SODIUM 100 MG: 100 CAPSULE, LIQUID FILLED ORAL at 17:13

## 2024-03-04 RX ADMIN — LEVALBUTEROL HYDROCHLORIDE 1.25 MG: 1.25 SOLUTION RESPIRATORY (INHALATION) at 20:14

## 2024-03-04 RX ADMIN — ASPIRIN 81 MG: 81 TABLET, COATED ORAL at 08:31

## 2024-03-04 RX ADMIN — GUAIFENESIN 600 MG: 600 TABLET ORAL at 08:31

## 2024-03-04 RX ADMIN — IPRATROPIUM BROMIDE 0.5 MG: 0.5 SOLUTION RESPIRATORY (INHALATION) at 07:38

## 2024-03-04 RX ADMIN — METOPROLOL SUCCINATE 25 MG: 25 TABLET, EXTENDED RELEASE ORAL at 08:31

## 2024-03-04 RX ADMIN — BUDESONIDE 0.5 MG: 0.5 INHALANT ORAL at 07:38

## 2024-03-04 NOTE — PROGRESS NOTES
[facility]  Progress Note  Name: Moriah Phillips I  MRN: [id number]  Unit/Bed#: -01 I Date of Admission: 3/1/2024   Date of Service: 3/4/2024 I Hospital Day: 3    Assessment/Plan   PRESTON (acute kidney injury) (HCC)  Assessment & Plan  Creatinine is 1.87  Baseline creatinine ranges between 0.9-1.0  Resolved    * Acute respiratory failure with hypoxia (HCC)  Assessment & Plan  Patient is saturating 86% on room air, now requiring 2 L of oxygen secondary to left lower lobe -currently using 3 L of oxygen  -pneumonia/COPD exacerbation secondary to pneumonia  Continue supplemental oxygen, respiratory protocol, continue treatment for pneumonia and COPD  COVID/flu panel negative  CT PE negative for PE at this time since patient is still using 3 L of oxygen.  Does have pulmonary fibrosis still.  Patient desaturated to 70% with ambulation, check echocardiogram  Pending pulmonary consult    Acute cystitis  Assessment & Plan  Continue IV antibiotic    Community acquired pneumonia of left lower lobe of lung  Assessment & Plan  Imaging shows right lower lobe pneumonia  Continue IV antibiotic, IV hydration, antiemetic, antipyretics  Respiratory protocol, follow blood culture, urine Legionella, pneumonia, procalcitonin  COVID/flu panel negative    CPAP (continuous positive airway pressure) dependence  Assessment & Plan  Continue CPAP-patient refused to use CPAP.    Chronic obstructive pulmonary disease with acute lower respiratory infection (HCC)  Assessment & Plan  Aggravated secondary to pneumonia  Continue treatment for pneumonia, inhaled nebulizer, IV steroid, respiratory protocol  Check CTA PE negative, per imaging patient does have fibrosis.  Patient does desaturate 70% with ambulation check echocardiogram, pending pulmonary consult    Malignant neoplasm of urinary bladder, unspecified site (HCC)  Assessment & Plan  Recently evaluated by [facility] urology, status post cystoscopy-did not reveal  any evidence of recurrent bladder cancer.  Per urology, patient placed on Macrobid for recurrent UTI, continue  Ultrasound of kidney bladder reviewed               VTE Pharmacologic Prophylaxis: VTE Score: 7 High Risk (Score >/= 5) - Pharmacological DVT Prophylaxis Ordered: heparin. Sequential Compression Devices Ordered.    Mobility:   Basic Mobility Inpatient Raw Score: 24  JH-HLM Goal: 8: Walk 250 feet or more  JH-HLM Achieved: 8: Walk 250 feet ot more  HLM Goal achieved. Continue to encourage appropriate mobility.    Patient Centered Rounds: I performed bedside rounds with nursing staff today.   Discussions with Specialists or Other Care Team Provider: Pending pulmonary    Education and Discussions with Family / Patient: Updated  ( and daughter) at bedside.    Current Length of Stay: 3 day(s)  Current Patient Status: Inpatient   Certification Statement: The patient will continue to require additional inpatient hospital stay due to monitor above conditions  Discharge Plan: Anticipate discharge in 24-48 hrs to home.    Code Status: Level 1 - Full Code    Subjective:   Seen and evaluated during the run.  Patient is still feeling weak and short of breath.  Per nurse, patient does desaturate to 70% with ambulation.    Objective:     Vitals:   Temp (24hrs), Av.5 °F (36.4 °C), Min:97.4 °F (36.3 °C), Max:97.7 °F (36.5 °C)    Temp:  [97.4 °F (36.3 °C)-97.7 °F (36.5 °C)] 97.5 °F (36.4 °C)  Resp:  [16-18] 18  BP: (144-150)/(73-89) 150/89  SpO2:  [81 %-92 %] 90 %  Body mass index is 34.23 kg/m².     Input and Output Summary (last 24 hours):     Intake/Output Summary (Last 24 hours) at 3/4/2024 1406  Last data filed at 3/3/2024 2301  Gross per 24 hour   Intake 240 ml   Output 700 ml   Net -460 ml       Physical Exam:   Physical Exam  Vitals and nursing note reviewed.   Constitutional:       Appearance: Normal appearance. She is not ill-appearing or diaphoretic.   Eyes:      General: No scleral  icterus.        Left eye: No discharge.      Extraocular Movements: Extraocular movements intact.      Conjunctiva/sclera: Conjunctivae normal.      Pupils: Pupils are equal, round, and reactive to light.   Cardiovascular:      Rate and Rhythm: Normal rate.      Heart sounds: No murmur heard.     No friction rub. No gallop.   Pulmonary:      Effort: Pulmonary effort is normal.      Breath sounds: Wheezing present. No rhonchi or rales.   Abdominal:      General: Abdomen is flat. Bowel sounds are normal. There is no distension.      Palpations: There is no mass.      Tenderness: There is no abdominal tenderness.      Hernia: No hernia is present.   Musculoskeletal:      Right lower leg: No edema.      Left lower leg: No edema.   Skin:     General: Skin is warm.   Neurological:      General: No focal deficit present.      Mental Status: She is alert and oriented to person, place, and time.      Cranial Nerves: No cranial nerve deficit.      Sensory: No sensory deficit.      Motor: No weakness.      Coordination: Coordination normal.         Additional Data:     Labs:  Results from last 7 days   Lab Units 03/01/24  1446   WBC Thousand/uL 6.34   HEMOGLOBIN g/dL 15.0   HEMATOCRIT % 45.5   PLATELETS Thousands/uL 161   NEUTROS PCT % 62   LYMPHS PCT % 21   MONOS PCT % 13*   EOS PCT % 2     Results from last 7 days   Lab Units 03/04/24  0453 03/02/24  0504 03/01/24  1446   SODIUM mmol/L 140   < > 136   POTASSIUM mmol/L 3.8   < > 3.6   CHLORIDE mmol/L 101   < > 97   CO2 mmol/L 32   < > 27   BUN mg/dL 20   < > 23   CREATININE mg/dL 0.79   < > 1.87*   ANION GAP mmol/L 7   < > 12   CALCIUM mg/dL 9.3   < > 9.0   ALBUMIN g/dL  --   --  4.2   TOTAL BILIRUBIN mg/dL  --   --  0.55   ALK PHOS U/L  --   --  58   ALT U/L  --   --  20   AST U/L  --   --  28   GLUCOSE RANDOM mg/dL 141*   < > 109    < > = values in this interval not displayed.                 Results from last 7 days   Lab Units 03/02/24  0504 03/01/24  1756 03/01/24  1446    LACTIC ACID mmol/L  --   --  1.1   PROCALCITONIN ng/ml 0.08 0.16  --        Lines/Drains:  Invasive Devices       Peripheral Intravenous Line  Duration             Peripheral IV 03/01/24 Proximal;Right;Ventral (anterior) Forearm 2 days              Drain  Duration             External Urinary Catheter 1 day                          Imaging: No pertinent imaging reviewed.    Recent Cultures (last 7 days):   Results from last 7 days   Lab Units 03/01/24 2035 03/01/24 2034 03/01/24  1446 03/01/24  1443   BLOOD CULTURE   --   --  No Growth at 48 hrs. No Growth at 48 hrs.   URINE CULTURE  Culture results to follow.  --   --   --    LEGIONELLA URINARY ANTIGEN   --  Negative  --   --        Last 24 Hours Medication List:   Current Facility-Administered Medications   Medication Dose Route Frequency Provider Last Rate    acetaminophen  650 mg Oral Q6H PRN Kalen Henderson MD      aspirin  81 mg Oral Daily Kalen Henderson MD      atorvastatin  40 mg Oral Daily With Dinner Kalen Henderson MD      budesonide  0.5 mg Nebulization Q12H Kalen Henderson MD      cefTRIAXone  1,000 mg Intravenous Q24H Kalen Henderson MD 1,000 mg (03/04/24 0622)    docusate sodium  100 mg Oral BID Kalen Henderson MD      guaiFENesin  600 mg Oral BID Kalen Henderson MD      heparin (porcine)  5,000 Units Subcutaneous Q8H [facility] Kalen Henderson MD      ipratropium  0.5 mg Nebulization TID Kalen Henderson MD      levalbuterol  1.25 mg Nebulization Q6H Kalen Henderson MD      methylPREDNISolone sodium succinate  40 mg Intravenous Q12H [facility] Kalen Henderson MD      metoprolol succinate  25 mg Oral Daily Kalen Henderson MD      nicotine  1 patch Transdermal Daily Kalen Henderson MD      nitrofurantoin  100 mg Oral Daily Kalen Henderson MD      ondansetron  4 mg Intravenous Q6H PRN Kalen Henderson MD      pantoprazole  20 mg Oral Early Morning Kalen Henderson MD      phenazopyridine  100 mg Oral TID With Meals Kalen Henderson  MD      polyethylene glycol  17 g Oral Daily Kalen Henderson MD      potassium chloride  10 mEq Oral Daily Kalen Henderson MD      pramipexole  0.5 mg Oral BID Kalen Henderson MD      sertraline  100 mg Oral Daily Kalen Henderson MD          Today, Patient Was Seen By: Kalen Henderson MD    **Please Note: This note may have been constructed using a voice recognition system.**

## 2024-03-04 NOTE — RESPIRATORY THERAPY NOTE
Resp Care       03/03/24 2037   Respiratory Assessment   Resp Comments changed tx back to Q6 for pt. She states she feels like they aren't working. Bs remain diminished with wheezing. No changes and cont on 2-3L nc no changes.        17-Oct-2018

## 2024-03-04 NOTE — ASSESSMENT & PLAN NOTE
Patient is saturating 86% on room air, now requiring 2 L of oxygen secondary to left lower lobe -currently using 3 L of oxygen  -pneumonia/COPD exacerbation secondary to pneumonia  Continue supplemental oxygen, respiratory protocol, continue treatment for pneumonia and COPD  COVID/flu panel negative  CT PE negative for PE at this time since patient is still using 3 L of oxygen.  Does have pulmonary fibrosis still.  Patient desaturated to 70% with ambulation, check echocardiogram  Pending pulmonary consult

## 2024-03-04 NOTE — PLAN OF CARE
Problem: Potential for Falls  Goal: Patient will remain free of falls  Description: INTERVENTIONS:  - Educate patient/family on patient safety including physical limitations  - Instruct patient to call for assistance with activity   - Consult OT/PT to assist with strengthening/mobility   - Keep Call bell within reach  - Keep bed low and locked with side rails adjusted as appropriate  - Keep care items and personal belongings within reach  - Initiate and maintain comfort rounds  - Make Fall Risk Sign visible to staff  - Offer Toileting every 2 Hours, in advance of need  - Initiate/Maintain bed and chair alarm  - Obtain necessary fall risk management equipment:   - Apply yellow socks and bracelet for high fall risk patients  - Consider moving patient to room near nurses station  Outcome: Progressing     Problem: PAIN - ADULT  Goal: Verbalizes/displays adequate comfort level or baseline comfort level  Description: Interventions:  - Encourage patient to monitor pain and request assistance  - Assess pain using appropriate pain scale  - Administer analgesics based on type and severity of pain and evaluate response  - Implement non-pharmacological measures as appropriate and evaluate response  - Consider cultural and social influences on pain and pain management  - Notify physician/advanced practitioner if interventions unsuccessful or patient reports new pain  Outcome: Progressing     Problem: INFECTION - ADULT  Goal: Absence or prevention of progression during hospitalization  Description: INTERVENTIONS:  - Assess and monitor for signs and symptoms of infection  - Monitor lab/diagnostic results  - Monitor all insertion sites, i.e. indwelling lines, tubes, and drains  - Monitor endotracheal if appropriate and nasal secretions for changes in amount and color  - [place] appropriate cooling/warming therapies per order  - Administer medications as ordered  - Instruct and encourage patient and family to use good hand  [FreeTextEntry1] : 2019\par \par \par \par Re:	Brice Richardson \par :	47\par \par I saw Brice Richardson in neurosurgical follow-up.  He had his CT scan.  On the CT scan, it demonstrates at the C5-6 level definite spinal cord compression.  The only issue is whether that compression is associated with his complaint, which is primarily his unsteadiness of gait.  He has trouble swimming because of that.  In his lower back, he has a slip at L4-5, which appears to be mobile.  He could be having two reasons for his unsteadiness.  One could be severe lumbar nerve root compression, which is interfering with information transfer regarding joint position sense, etc., in his lower extremities.  Two, he could have a myelopathy with spinal cord dysfunction.  \par \par He needs a new MRI.  The prior MRI does not show any abnormalities of the cord substance.  I would like to have a high field MRI, at least 1.5T, so that we can see whether there are any signal changes within the cord to suggest active inflammatory response.  I have ordered that test.  Although he has trouble lying flat, I told him it could be done in segments so that he is allowed to rest or sit up between each sequence.  I will see him back after that is completed.  \par \par On flexion and extension views of his neck, he gets to neutral, so the question of course is:\par 1.	Whether he needs surgery at all,\par 2.	Should it be done on his neck,\par 3.	Whether if we do it on his neck, does he need a corpectomy at C5 and C6 and backup, or can this all be done from the back. \par \par I will await the MRI to make final decisions.  \par \par \par  \par \par Anthony Ba MD\par FRANCISCO JAVIER/[facility] #[id number]_MEL\par \par  hygiene technique  - Identify and instruct in appropriate isolation precautions for identified infection/condition  Outcome: Progressing     Problem: SAFETY ADULT  Goal: Patient will remain free of falls  Description: INTERVENTIONS:  - Educate patient/family on patient safety including physical limitations  - Instruct patient to call for assistance with activity   - Consult OT/PT to assist with strengthening/mobility   - Keep Call bell within reach  - Keep bed low and locked with side rails adjusted as appropriate  - Keep care items and personal belongings within reach  - Initiate and maintain comfort rounds  - Make Fall Risk Sign visible to staff  - Offer Toileting every 2 Hours, in advance of need  - Initiate/Maintain bed and chair alarm  - Obtain necessary fall risk management equipment:   - Apply yellow socks and bracelet for high fall risk patients  - Consider moving patient to room near nurses station  Outcome: Progressing  Goal: Maintain or return to baseline ADL function  Description: INTERVENTIONS:  -  Assess patient's ability to carry out ADLs; assess patient's baseline for ADL function and identify physical deficits which impact ability to perform ADLs (bathing, care of mouth/teeth, toileting, grooming, dressing, etc.)  - Assess/evaluate cause of self-care deficits   - Assess range of motion  - Assess patient's mobility; develop plan if impaired  - Assess patient's need for assistive devices and provide as appropriate  - Encourage maximum independence but intervene and supervise when necessary  - Involve family in performance of ADLs  - Assess for home care needs following discharge   - Consider OT consult to assist with ADL evaluation and planning for discharge  - Provide patient education as appropriate  Outcome: Progressing  Goal: Maintains/Returns to pre admission functional level  Description: INTERVENTIONS:  - Perform AM-PAC 6 Click Basic Mobility/ Daily Activity assessment daily.  - Set and  communicate daily mobility goal to care team and patient/family/caregiver.   - Collaborate with rehabilitation services on mobility goals if consulted  - Perform Range of Motion 3 times a day.  - Reposition patient every 2 hours.  - Dangle patient 3 times a day  - Stand patient 3 times a day  - Ambulate patient 3 times a day  - Out of bed to chair 3 times a day   - Out of bed for meals 3 times a day  - Out of bed for toileting  - Record patient progress and toleration of activity level   Outcome: Progressing     Problem: DISCHARGE PLANNING  Goal: Discharge to home or other facility with appropriate resources  Description: INTERVENTIONS:  - Identify barriers to discharge w/patient and caregiver  - Arrange for needed discharge resources and transportation as appropriate  - Identify discharge learning needs (meds, wound care, etc.)  - Arrange for interpretive services to assist at discharge as needed  - Refer to Case Management Department for coordinating discharge planning if the patient needs post-hospital services based on physician/advanced practitioner order or complex needs related to functional status, cognitive ability, or social support system  Outcome: Progressing     Problem: Knowledge Deficit  Goal: Patient/family/caregiver demonstrates understanding of disease process, treatment plan, medications, and discharge instructions  Description: Complete learning assessment and assess knowledge base.  Interventions:  - Provide teaching at level of understanding  - Provide teaching via preferred learning methods  Outcome: Progressing     Problem: RESPIRATORY - ADULT  Goal: Achieves optimal ventilation and oxygenation  Description: INTERVENTIONS:  - Assess for changes in respiratory status  - Assess for changes in mentation and behavior  - Position to facilitate oxygenation and minimize respiratory effort  - Oxygen administered by appropriate delivery if ordered  - Initiate smoking cessation education as  indicated  - Encourage broncho-pulmonary hygiene including cough, deep breathe, Incentive Spirometry  - Assess the need for suctioning and aspirate as needed  - Assess and instruct to report SOB or any respiratory difficulty  - Respiratory Therapy support as indicated  Outcome: Progressing     Problem: Nutrition/Hydration-ADULT  Goal: Nutrient/Hydration intake appropriate for improving, restoring or maintaining nutritional needs  Description: Monitor and assess patient's nutrition/hydration status for malnutrition. Collaborate with interdisciplinary team and initiate plan and interventions as ordered.  Monitor patient's weight and dietary intake as ordered or per policy. Utilize nutrition screening tool and intervene as necessary. Determine patient's food preferences and provide high-protein, high-caloric foods as appropriate.     INTERVENTIONS:  - Monitor oral intake, urinary output, labs, and treatment plans  - Assess nutrition and hydration status and recommend course of action  - Evaluate amount of meals eaten  - Assist patient with eating if necessary   - Allow adequate time for meals  - Recommend/ encourage appropriate diets, oral nutritional supplements, and vitamin/mineral supplements  - Order, calculate, and assess calorie counts as needed  - Recommend, monitor, and adjust tube feedings and TPN/PPN based on assessed needs  - Assess need for intravenous fluids  - Provide specific nutrition/hydration education as appropriate  - Include patient/family/caregiver in decisions related to nutrition  Outcome: Progressing

## 2024-03-04 NOTE — PLAN OF CARE
Problem: Potential for Falls  Goal: Patient will remain free of falls  Description: INTERVENTIONS:  - Educate patient/family on patient safety including physical limitations  - Instruct patient to call for assistance with activity   - Consult OT/PT to assist with strengthening/mobility   - Keep Call bell within reach  - Keep bed low and locked with side rails adjusted as appropriate  - Keep care items and personal belongings within reach  - Initiate and maintain comfort rounds  Outcome: Progressing     Problem: PAIN - ADULT  Goal: Verbalizes/displays adequate comfort level or baseline comfort level  Description: Interventions:  - Encourage patient to monitor pain and request assistance  - Assess pain using appropriate pain scale  - Administer analgesics based on type and severity of pain and evaluate response  - Implement non-pharmacological measures as appropriate and evaluate response  - Consider cultural and social influences on pain and pain management  - Notify physician/advanced practitioner if interventions unsuccessful or patient reports new pain  Outcome: Progressing     Problem: INFECTION - ADULT  Goal: Absence or prevention of progression during hospitalization  Description: INTERVENTIONS:  - Assess and monitor for signs and symptoms of infection  - Monitor lab/diagnostic results  - Monitor all insertion sites, i.e. indwelling lines, tubes, and drains  - Monitor endotracheal if appropriate and nasal secretions for changes in amount and color  - [place] appropriate cooling/warming therapies per order  - Administer medications as ordered  - Instruct and encourage patient and family to use good hand hygiene technique  - Identify and instruct in appropriate isolation precautions for identified infection/condition  Outcome: Progressing     Problem: SAFETY ADULT  Goal: Patient will remain free of falls  Description: INTERVENTIONS:  - Educate patient/family on patient safety including physical limitations  -  Instruct patient to call for assistance with activity   - Consult OT/PT to assist with strengthening/mobility   - Keep Call bell within reach  - Keep bed low and locked with side rails adjusted as appropriate  - Keep care items and personal belongings within reach  - Initiate and maintain comfort rounds  Outcome: Progressing  Goal: Maintain or return to baseline ADL function  Description: INTERVENTIONS:  -  Assess patient's ability to carry out ADLs; assess patient's baseline for ADL function and identify physical deficits which impact ability to perform ADLs (bathing, care of mouth/teeth, toileting, grooming, dressing, etc.)  - Assess/evaluate cause of self-care deficits   - Assess range of motion  - Assess patient's mobility; develop plan if impaired  - Assess patient's need for assistive devices and provide as appropriate  - Encourage maximum independence but intervene and supervise when necessary  - Involve family in performance of ADLs  - Assess for home care needs following discharge   - Consider OT consult to assist with ADL evaluation and planning for discharge  - Provide patient education as appropriate  Outcome: Progressing  Goal: Maintains/Returns to pre admission functional level  Description: INTERVENTIONS:  - Perform AM-PAC 6 Click Basic Mobility/ Daily Activity assessment daily.  - Set and communicate daily mobility goal to care team and patient/family/caregiver.   - Collaborate with rehabilitation services on mobility goals if consulted  - Perform Range of Motion 3 times a day.  - Reposition patient every 2 hours.  - Dangle patient 2 times a day  - Stand patient 2 times a day  - Ambulate patient 2 times a day  - Out of bed to chair 2 times a day   - Out of bed for meals 2 times a day  - Out of bed for toileting  - Record patient progress and toleration of activity level   Outcome: Progressing     Problem: DISCHARGE PLANNING  Goal: Discharge to home or other facility with appropriate  resources  Description: INTERVENTIONS:  - Identify barriers to discharge w/patient and caregiver  - Arrange for needed discharge resources and transportation as appropriate  - Identify discharge learning needs (meds, wound care, etc.)  - Arrange for interpretive services to assist at discharge as needed  - Refer to Case Management Department for coordinating discharge planning if the patient needs post-hospital services based on physician/advanced practitioner order or complex needs related to functional status, cognitive ability, or social support system  Outcome: Progressing     Problem: Knowledge Deficit  Goal: Patient/family/caregiver demonstrates understanding of disease process, treatment plan, medications, and discharge instructions  Description: Complete learning assessment and assess knowledge base.  Interventions:  - Provide teaching at level of understanding  - Provide teaching via preferred learning methods  Outcome: Progressing     Problem: RESPIRATORY - ADULT  Goal: Achieves optimal ventilation and oxygenation  Description: INTERVENTIONS:  - Assess for changes in respiratory status  - Assess for changes in mentation and behavior  - Position to facilitate oxygenation and minimize respiratory effort  - Oxygen administered by appropriate delivery if ordered  - Initiate smoking cessation education as indicated  - Encourage broncho-pulmonary hygiene including cough, deep breathe, Incentive Spirometry  - Assess the need for suctioning and aspirate as needed  - Assess and instruct to report SOB or any respiratory difficulty  - Respiratory Therapy support as indicated  Outcome: Progressing     Problem: Nutrition/Hydration-ADULT  Goal: Nutrient/Hydration intake appropriate for improving, restoring or maintaining nutritional needs  Description: Monitor and assess patient's nutrition/hydration status for malnutrition. Collaborate with interdisciplinary team and initiate plan and interventions as ordered.  Monitor  patient's weight and dietary intake as ordered or per policy. Utilize nutrition screening tool and intervene as necessary. Determine patient's food preferences and provide high-protein, high-caloric foods as appropriate.     INTERVENTIONS:  - Monitor oral intake, urinary output, labs, and treatment plans  - Assess nutrition and hydration status and recommend course of action  - Evaluate amount of meals eaten  - Assist patient with eating if necessary   - Allow adequate time for meals  - Recommend/ encourage appropriate diets, oral nutritional supplements, and vitamin/mineral supplements  - Order, calculate, and assess calorie counts as needed  - Recommend, monitor, and adjust tube feedings and TPN/PPN based on assessed needs  - Assess need for intravenous fluids  - Provide specific nutrition/hydration education as appropriate  - Include patient/family/caregiver in decisions related to nutrition  Outcome: Progressing

## 2024-03-04 NOTE — ASSESSMENT & PLAN NOTE
Recently evaluated by  [facility], status post cystoscopy-did not reveal any evidence of recurrent bladder cancer.  Per urology, patient placed on Macrobid for recurrent UTI, continue  Ultrasound of kidney bladder reviewed

## 2024-03-04 NOTE — ASSESSMENT & PLAN NOTE
Aggravated secondary to pneumonia  Continue treatment for pneumonia, inhaled nebulizer, IV steroid, respiratory protocol  Check CTA PE negative, per imaging patient does have fibrosis.  Patient does desaturate 70% with ambulation check echocardiogram, pending pulmonary consult

## 2024-03-05 ENCOUNTER — APPOINTMENT (INPATIENT)
Dept: NON INVASIVE DIAGNOSTICS | Facility: HOSPITAL | Age: 74
DRG: 682 | End: 2024-03-05
Payer: MEDICARE

## 2024-03-05 LAB
AORTIC ROOT: 3.6 CM
APICAL FOUR CHAMBER EJECTION FRACTION: 73 %
BSA FOR ECHO PROCEDURE: 1.95 M2
E WAVE DECELERATION TIME: 169 MS
E/A RATIO: 0.78
FRACTIONAL SHORTENING: 36 (ref 28–44)
INTERVENTRICULAR SEPTUM IN DIASTOLE (PARASTERNAL SHORT AXIS VIEW): 1.1 CM
INTERVENTRICULAR SEPTUM: 1.1 CM (ref 0.6–1.1)
LAAS-AP2: 17.7 CM2
LAAS-AP4: 17.2 CM2
LEFT ATRIUM SIZE: 4.4 CM
LEFT ATRIUM VOLUME (MOD BIPLANE): 44 ML
LEFT ATRIUM VOLUME INDEX (MOD BIPLANE): 22.6 ML/M2
LEFT INTERNAL DIMENSION IN SYSTOLE: 3.6 CM (ref 2.1–4)
LEFT VENTRICLE DIASTOLIC VOLUME (MOD BIPLANE): 67 ML
LEFT VENTRICLE DIASTOLIC VOLUME INDEX (MOD BIPLANE): 34.4 ML/M2
LEFT VENTRICLE SYSTOLIC VOLUME (MOD BIPLANE): 20 ML
LEFT VENTRICLE SYSTOLIC VOLUME INDEX (MOD BIPLANE): 10.3 ML/M2
LEFT VENTRICULAR INTERNAL DIMENSION IN DIASTOLE: 5.6 CM (ref 3.5–6)
LEFT VENTRICULAR POSTERIOR WALL IN END DIASTOLE: 1.1 CM
LEFT VENTRICULAR STROKE VOLUME: 96 ML
LV EF: 71 %
LVSV (TEICH): 96 ML
MV E'TISSUE VEL-LAT: 12 CM/S
MV E'TISSUE VEL-SEP: 7 CM/S
MV PEAK A VEL: 1.06 M/S
MV PEAK E VEL: 83 CM/S
MV STENOSIS PRESSURE HALF TIME: 49 MS
MV VALVE AREA P 1/2 METHOD: 4.49
RA PRESSURE ESTIMATED: 8 MMHG
RIGHT ATRIUM AREA SYSTOLE A4C: 13.2 CM2
RIGHT VENTRICLE ID DIMENSION: 4.1 CM
SL CV LEFT ATRIUM LENGTH A2C: 5.7 CM
SL CV LV EF: 65
SL CV PED ECHO LEFT VENTRICLE DIASTOLIC VOLUME (MOD BIPLANE) 2D: 151 ML
SL CV PED ECHO LEFT VENTRICLE SYSTOLIC VOLUME (MOD BIPLANE) 2D: 55 ML
TRICUSPID ANNULAR PLANE SYSTOLIC EXCURSION: 1.4 CM

## 2024-03-05 PROCEDURE — 99223 1ST HOSP IP/OBS HIGH 75: CPT | Performed by: PHYSICIAN ASSISTANT

## 2024-03-05 PROCEDURE — 94640 AIRWAY INHALATION TREATMENT: CPT

## 2024-03-05 PROCEDURE — 94760 N-INVAS EAR/PLS OXIMETRY 1: CPT

## 2024-03-05 PROCEDURE — 99232 SBSQ HOSP IP/OBS MODERATE 35: CPT | Performed by: FAMILY MEDICINE

## 2024-03-05 PROCEDURE — 93306 TTE W/DOPPLER COMPLETE: CPT

## 2024-03-05 RX ORDER — VARENICLINE TARTRATE 0.5 MG/1
1 TABLET, FILM COATED ORAL 2 TIMES DAILY
Qty: 308 TABLET | Refills: 0 | Status: DISCONTINUED | OUTPATIENT
Start: 2024-03-12 | End: 2024-03-06 | Stop reason: HOSPADM

## 2024-03-05 RX ORDER — VARENICLINE TARTRATE 0.5 MG/1
0.5 TABLET, FILM COATED ORAL DAILY
Qty: 3 TABLET | Refills: 0 | Status: DISCONTINUED | OUTPATIENT
Start: 2024-03-05 | End: 2024-03-06 | Stop reason: HOSPADM

## 2024-03-05 RX ORDER — CEFPODOXIME PROXETIL 200 MG/1
400 TABLET, FILM COATED ORAL 2 TIMES DAILY WITH MEALS
Status: DISCONTINUED | OUTPATIENT
Start: 2024-03-05 | End: 2024-03-06 | Stop reason: HOSPADM

## 2024-03-05 RX ORDER — PREDNISONE 20 MG/1
40 TABLET ORAL DAILY
Status: DISCONTINUED | OUTPATIENT
Start: 2024-03-06 | End: 2024-03-06 | Stop reason: HOSPADM

## 2024-03-05 RX ORDER — VARENICLINE TARTRATE 0.5 MG/1
0.5 TABLET, FILM COATED ORAL 2 TIMES DAILY
Qty: 8 TABLET | Refills: 0 | Status: DISCONTINUED | OUTPATIENT
Start: 2024-03-08 | End: 2024-03-06 | Stop reason: HOSPADM

## 2024-03-05 RX ORDER — LOPERAMIDE HYDROCHLORIDE 2 MG/1
2 CAPSULE ORAL 4 TIMES DAILY PRN
Status: DISCONTINUED | OUTPATIENT
Start: 2024-03-05 | End: 2024-03-06 | Stop reason: HOSPADM

## 2024-03-05 RX ADMIN — NITROFURANTOIN (MONOHYDRATE/MACROCRYSTALS) 100 MG: 75; 25 CAPSULE ORAL at 09:02

## 2024-03-05 RX ADMIN — PRAMIPEXOLE DIHYDROCHLORIDE 0.5 MG: 0.5 TABLET ORAL at 18:00

## 2024-03-05 RX ADMIN — METOPROLOL SUCCINATE 25 MG: 25 TABLET, EXTENDED RELEASE ORAL at 09:02

## 2024-03-05 RX ADMIN — POTASSIUM CHLORIDE 10 MEQ: 750 TABLET, EXTENDED RELEASE ORAL at 09:02

## 2024-03-05 RX ADMIN — LEVALBUTEROL HYDROCHLORIDE 1.25 MG: 1.25 SOLUTION RESPIRATORY (INHALATION) at 13:28

## 2024-03-05 RX ADMIN — METHYLPREDNISOLONE SODIUM SUCCINATE 40 MG: 40 INJECTION, POWDER, FOR SOLUTION INTRAMUSCULAR; INTRAVENOUS at 09:03

## 2024-03-05 RX ADMIN — GUAIFENESIN 600 MG: 600 TABLET ORAL at 09:02

## 2024-03-05 RX ADMIN — IPRATROPIUM BROMIDE 0.5 MG: 0.5 SOLUTION RESPIRATORY (INHALATION) at 20:14

## 2024-03-05 RX ADMIN — VARENICLINE TARTRATE 0.5 MG: 0.5 TABLET, FILM COATED ORAL at 13:09

## 2024-03-05 RX ADMIN — HEPARIN SODIUM 5000 UNITS: 5000 INJECTION INTRAVENOUS; SUBCUTANEOUS at 00:42

## 2024-03-05 RX ADMIN — BUDESONIDE 0.5 MG: 0.5 INHALANT ORAL at 07:37

## 2024-03-05 RX ADMIN — DOCUSATE SODIUM 100 MG: 100 CAPSULE, LIQUID FILLED ORAL at 09:02

## 2024-03-05 RX ADMIN — PANTOPRAZOLE SODIUM 20 MG: 20 TABLET, DELAYED RELEASE ORAL at 06:13

## 2024-03-05 RX ADMIN — CEFPODOXIME PROXETIL 400 MG: 200 TABLET, FILM COATED ORAL at 18:01

## 2024-03-05 RX ADMIN — GUAIFENESIN 600 MG: 600 TABLET ORAL at 18:00

## 2024-03-05 RX ADMIN — IPRATROPIUM BROMIDE 0.5 MG: 0.5 SOLUTION RESPIRATORY (INHALATION) at 13:28

## 2024-03-05 RX ADMIN — METHYLPREDNISOLONE SODIUM SUCCINATE 40 MG: 40 INJECTION, POWDER, FOR SOLUTION INTRAMUSCULAR; INTRAVENOUS at 20:32

## 2024-03-05 RX ADMIN — CEFTRIAXONE 1000 MG: 1 INJECTION, SOLUTION INTRAVENOUS at 06:12

## 2024-03-05 RX ADMIN — ASPIRIN 81 MG: 81 TABLET, COATED ORAL at 09:02

## 2024-03-05 RX ADMIN — SERTRALINE 100 MG: 100 TABLET, FILM COATED ORAL at 09:02

## 2024-03-05 RX ADMIN — BUDESONIDE 0.5 MG: 0.5 INHALANT ORAL at 20:14

## 2024-03-05 RX ADMIN — PHENAZOPYRIDINE 100 MG: 100 TABLET ORAL at 18:00

## 2024-03-05 RX ADMIN — LOPERAMIDE HYDROCHLORIDE 2 MG: 2 CAPSULE ORAL at 18:00

## 2024-03-05 RX ADMIN — POLYETHYLENE GLYCOL 3350 17 G: 17 POWDER, FOR SOLUTION ORAL at 09:03

## 2024-03-05 RX ADMIN — HEPARIN SODIUM 5000 UNITS: 5000 INJECTION INTRAVENOUS; SUBCUTANEOUS at 18:01

## 2024-03-05 RX ADMIN — IPRATROPIUM BROMIDE 0.5 MG: 0.5 SOLUTION RESPIRATORY (INHALATION) at 07:37

## 2024-03-05 RX ADMIN — LEVALBUTEROL HYDROCHLORIDE 1.25 MG: 1.25 SOLUTION RESPIRATORY (INHALATION) at 07:37

## 2024-03-05 RX ADMIN — PHENAZOPYRIDINE 100 MG: 100 TABLET ORAL at 11:54

## 2024-03-05 RX ADMIN — LEVALBUTEROL HYDROCHLORIDE 1.25 MG: 1.25 SOLUTION RESPIRATORY (INHALATION) at 20:14

## 2024-03-05 RX ADMIN — PRAMIPEXOLE DIHYDROCHLORIDE 0.5 MG: 0.5 TABLET ORAL at 09:02

## 2024-03-05 RX ADMIN — ATORVASTATIN CALCIUM 40 MG: 40 TABLET, FILM COATED ORAL at 18:00

## 2024-03-05 RX ADMIN — NICOTINE 1 PATCH: 14 PATCH, EXTENDED RELEASE TRANSDERMAL at 09:06

## 2024-03-05 RX ADMIN — LEVALBUTEROL HYDROCHLORIDE 1.25 MG: 1.25 SOLUTION RESPIRATORY (INHALATION) at 01:04

## 2024-03-05 RX ADMIN — PHENAZOPYRIDINE 100 MG: 100 TABLET ORAL at 09:02

## 2024-03-05 RX ADMIN — HEPARIN SODIUM 5000 UNITS: 5000 INJECTION INTRAVENOUS; SUBCUTANEOUS at 09:03

## 2024-03-05 NOTE — PROGRESS NOTES
[facility]  Progress Note  Name: Moriah Phillips I  MRN: [id number]  Unit/Bed#: -01 I Date of Admission: 3/1/2024   Date of Service: 3/5/2024 I Hospital Day: 4    Assessment/Plan   * Acute respiratory failure with hypoxia (HCC)  Assessment & Plan  Patient does not use oxygen at baseline.-currently using 2-3 L of oxygen  -pneumonia/COPD exacerbation secondary to pneumonia  Continue supplemental oxygen, respiratory protocol, continue treatment for pneumonia and COPD  COVID/flu panel negative  CT PE negative for PE . Does have pulmonary fibrosis still.  Will do ambulatory O2 requirement study tomorrow.  Appreciate pulmonary input continue tapering steroids    Acute cystitis  Assessment & Plan  Urine culture showing lactobacillus will discontinue IV Rocephin and placed on 3 more days of oral Keflex for now due to COPD exacerbation    [facility] (acute kidney injury) ([facility])  Assessment & Plan  Creatinine is 1.87  Baseline creatinine ranges between 0.9-1.0  Resolved    Community acquired pneumonia of left lower lobe of lung  Assessment & Plan  Imaging shows right lower lobe pneumonia  on IV antibiotic, IV hydration, antiemetic, antipyretics. Switch to oral antibiotics  Respiratory protocol, follow blood culture, urine Legionella, pneumonia, procalcitonin  COVID/flu panel negative    CPAP (continuous positive airway pressure) dependence  Assessment & Plan  Continue CPAP-patient refused to use CPAP.    Chronic obstructive pulmonary disease with acute lower respiratory infection (HCC)  Assessment & Plan  Aggravated secondary to pneumonia  Continue treatment for pneumonia, inhaled nebulizer, IV steroid, respiratory protocol  Check CTA PE negative, per imaging patient does have fibrosis.  2decho shows normal EF.  Appreciate pulmonary input.  Switch from IV Rocephin to oral vantin    Malignant neoplasm of urinary bladder, unspecified site (HCC)  Assessment & Plan  Recently evaluated by [facility]  urology, status post cystoscopy-did not reveal any evidence of recurrent bladder cancer.  Per urology, patient placed on Macrobid for recurrent UTI, continue  Ultrasound of kidney bladder reviewed               VTE Pharmacologic Prophylaxis: VTE Score: 7 Moderate Risk (Score 3-4) - Pharmacological DVT Prophylaxis Ordered: heparin.    Mobility:   Basic Mobility Inpatient Raw Score: 24  JH-HLM Goal: 8: Walk 250 feet or more  JH-HLM Achieved: 8: Walk 250 feet ot more  HLM Goal achieved. Continue to encourage appropriate mobility.    Patient Centered Rounds: I performed bedside rounds with nursing staff today.   Discussions with Specialists or Other Care Team Provider: ilya barrios    Education and Discussions with Family / Patient: will update    Total Time Spent on Date of Encounter in care of patient: 35 mins. This time was spent on one or more of the following: performing physical exam; counseling and coordination of care; obtaining or reviewing history; documenting in the medical record; reviewing/ordering tests, medications or procedures; communicating with other healthcare professionals and discussing with patient's family/caregivers.    Current Length of Stay: 4 day(s)  Current Patient Status: Inpatient   Certification Statement: The patient will continue to require additional inpatient hospital stay due to acute resp failure  Discharge Plan: Anticipate discharge in 24-48 hrs to home with home services.    Code Status: Level 1 - Full Code    Subjective:   Patient denies any chest pain.  She feels her breathing is slowly improving she states she has a urinary tract infection all the time.    Objective:     Vitals:   Temp (24hrs), Av.5 °F (36.4 °C), Min:97.2 °F (36.2 °C), Max:97.7 °F (36.5 °C)    Temp:  [97.2 °F (36.2 °C)-97.7 °F (36.5 °C)] 97.7 °F (36.5 °C)  HR:  [62-84] 84  Resp:  [18] 18  BP: (138-167)/(67-94) 143/73  SpO2:  [90 %] 90 %  Body mass index is 34.16 kg/m².     Input and Output Summary (last 24  hours):   No intake or output data in the 24 hours ending 03/05/24 1120    Physical Exam:   Physical Exam  Vitals and nursing note reviewed.   Constitutional:       Appearance: Normal appearance.   HENT:      Head: Normocephalic and atraumatic.      Right Ear: External ear normal.      Left Ear: External ear normal.      Nose: Nose normal.      Mouth/Throat:      Pharynx: Oropharynx is clear.   Eyes:      Pupils: Pupils are equal, round, and reactive to light.   Cardiovascular:      Rate and Rhythm: Normal rate and regular rhythm.      Heart sounds: Normal heart sounds.   Pulmonary:      Effort: Pulmonary effort is normal.      Breath sounds: Rhonchi present.   Abdominal:      General: Bowel sounds are normal.      Palpations: Abdomen is soft.      Tenderness: There is no abdominal tenderness.   Musculoskeletal:         General: Normal range of motion.      Cervical back: Normal range of motion and neck supple.   Skin:     General: Skin is warm and dry.      Capillary Refill: Capillary refill takes less than 2 seconds.   Neurological:      General: No focal deficit present.      Mental Status: She is alert and oriented to person, place, and time.   Psychiatric:         Mood and Affect: Mood normal.            Additional Data:     Labs:  Results from last 7 days   Lab Units 03/01/24  1446   WBC Thousand/uL 6.34   HEMOGLOBIN g/dL 15.0   HEMATOCRIT % 45.5   PLATELETS Thousands/uL 161   NEUTROS PCT % 62   LYMPHS PCT % 21   MONOS PCT % 13*   EOS PCT % 2     Results from last 7 days   Lab Units 03/04/24  0453 03/02/24  0504 03/01/24  1446   SODIUM mmol/L 140   < > 136   POTASSIUM mmol/L 3.8   < > 3.6   CHLORIDE mmol/L 101   < > 97   CO2 mmol/L 32   < > 27   BUN mg/dL 20   < > 23   CREATININE mg/dL 0.79   < > 1.87*   ANION GAP mmol/L 7   < > 12   CALCIUM mg/dL 9.3   < > 9.0   ALBUMIN g/dL  --   --  4.2   TOTAL BILIRUBIN mg/dL  --   --  0.55   ALK PHOS U/L  --   --  58   ALT U/L  --   --  20   AST U/L  --   --  28   GLUCOSE  RANDOM mg/dL 141*   < > 109    < > = values in this interval not displayed.                 Results from last 7 days   Lab Units 03/02/24  0504 03/01/24  1756 03/01/24  1446   LACTIC ACID mmol/L  --   --  1.1   PROCALCITONIN ng/ml 0.08 0.16  --        Lines/Drains:  Invasive Devices       Peripheral Intravenous Line  Duration             Peripheral IV 03/05/24 Dorsal (posterior);Right Wrist <1 day                          Imaging: Reviewed radiology reports from this admission including: chest CT scan and ECHO    Recent Cultures (last 7 days):   Results from last 7 days   Lab Units 03/01/24  2035 03/01/24  2034 03/01/24  1446 03/01/24  1443   BLOOD CULTURE   --   --  No Growth at 72 hrs. No Growth at 72 hrs.   URINE CULTURE  50,000-59,000 cfu/ml Lactobacillus species*  9624-0672 cfu/ml  --   --   --    LEGIONELLA URINARY ANTIGEN   --  Negative  --   --        Last 24 Hours Medication List:   Current Facility-Administered Medications   Medication Dose Route Frequency Provider Last Rate    acetaminophen  650 mg Oral Q6H PRN Kalen Henderson MD      aspirin  81 mg Oral Daily Kalen Henderson MD      atorvastatin  40 mg Oral Daily With Dinner Kalen Henderson MD      budesonide  0.5 mg Nebulization Q12H Kalen Henderson MD      cefTRIAXone  1,000 mg Intravenous Q24H Kalen Henderson MD 1,000 mg (03/05/24 0612)    docusate sodium  100 mg Oral BID Kalen Henderson MD      guaiFENesin  600 mg Oral BID Kalen Henderson MD      heparin (porcine)  5,000 Units Subcutaneous Q8H [facility] Kalen [facility] MD      ipratropium  0.5 mg Nebulization TID Kalen Henderson MD      levalbuterol  1.25 mg Nebulization Q6H Kalen Henderson MD      methylPREDNISolone sodium succinate  40 mg Intravenous Q12H [facility] Kalen Henderson MD      metoprolol succinate  25 mg Oral Daily Kalen Henderson MD      nicotine  1 patch Transdermal Daily Kalen Henderson MD      nitrofurantoin  100 mg Oral Daily Kalen Henderson MD      ondansetron  4  mg Intravenous Q6H PRN Kalen Henderson MD      pantoprazole  20 mg Oral Early Morning Kalen Henderson MD      phenazopyridine  100 mg Oral TID With Meals Kalen Henderson MD      polyethylene glycol  17 g Oral Daily Kalen Henderson MD      potassium chloride  10 mEq Oral Daily Kalen Henderson MD      pramipexole  0.5 mg Oral BID Kalen Henderson MD      sertraline  100 mg Oral Daily Kalen Henderson MD          Today, Patient Was Seen By: Shayla Cornejo MD    **Please Note: This note may have been constructed using a voice recognition system.**

## 2024-03-05 NOTE — PLAN OF CARE
Problem: Potential for Falls  Goal: Patient will remain free of falls  Description: INTERVENTIONS:  - Educate patient/family on patient safety including physical limitations  - Instruct patient to call for assistance with activity   - Consult OT/PT to assist with strengthening/mobility   - Keep Call bell within reach  - Keep bed low and locked with side rails adjusted as appropriate  - Keep care items and personal belongings within reach  - Initiate and maintain comfort rounds  - Make Fall Risk Sign visible to staff  - Offer Toileting every 2 Hours, in advance of need  - Initiate/Maintain bed and chair alarm  - Obtain necessary fall risk management equipment:   - Apply yellow socks and bracelet for high fall risk patients  - Consider moving patient to room near nurses station  Outcome: Progressing     Problem: PAIN - ADULT  Goal: Verbalizes/displays adequate comfort level or baseline comfort level  Description: Interventions:  - Encourage patient to monitor pain and request assistance  - Assess pain using appropriate pain scale  - Administer analgesics based on type and severity of pain and evaluate response  - Implement non-pharmacological measures as appropriate and evaluate response  - Consider cultural and social influences on pain and pain management  - Notify physician/advanced practitioner if interventions unsuccessful or patient reports new pain  Outcome: Progressing     Problem: INFECTION - ADULT  Goal: Absence or prevention of progression during hospitalization  Description: INTERVENTIONS:  - Assess and monitor for signs and symptoms of infection  - Monitor lab/diagnostic results  - Monitor all insertion sites, i.e. indwelling lines, tubes, and drains  - Monitor endotracheal if appropriate and nasal secretions for changes in amount and color  - [place] appropriate cooling/warming therapies per order  - Administer medications as ordered  - Instruct and encourage patient and family to use good hand  hygiene technique  - Identify and instruct in appropriate isolation precautions for identified infection/condition  Outcome: Progressing     Problem: SAFETY ADULT  Goal: Patient will remain free of falls  Description: INTERVENTIONS:  - Educate patient/family on patient safety including physical limitations  - Instruct patient to call for assistance with activity   - Consult OT/PT to assist with strengthening/mobility   - Keep Call bell within reach  - Keep bed low and locked with side rails adjusted as appropriate  - Keep care items and personal belongings within reach  - Initiate and maintain comfort rounds  - Make Fall Risk Sign visible to staff  - Offer Toileting every 2 Hours, in advance of need  - Initiate/Maintain bed and chair alarm  - Obtain necessary fall risk management equipment:   - Apply yellow socks and bracelet for high fall risk patients  - Consider moving patient to room near nurses station  Outcome: Progressing  Goal: Maintain or return to baseline ADL function  Description: INTERVENTIONS:  -  Assess patient's ability to carry out ADLs; assess patient's baseline for ADL function and identify physical deficits which impact ability to perform ADLs (bathing, care of mouth/teeth, toileting, grooming, dressing, etc.)  - Assess/evaluate cause of self-care deficits   - Assess range of motion  - Assess patient's mobility; develop plan if impaired  - Assess patient's need for assistive devices and provide as appropriate  - Encourage maximum independence but intervene and supervise when necessary  - Involve family in performance of ADLs  - Assess for home care needs following discharge   - Consider OT consult to assist with ADL evaluation and planning for discharge  - Provide patient education as appropriate  Outcome: Progressing  Goal: Maintains/Returns to pre admission functional level  Description: INTERVENTIONS:  - Perform AM-PAC 6 Click Basic Mobility/ Daily Activity assessment daily.  - Set and  communicate daily mobility goal to care team and patient/family/caregiver.   - Collaborate with rehabilitation services on mobility goals if consulted  - Perform Range of Motion 3 times a day.  - Reposition patient every 2 hours.  - Dangle patient 3 times a day  - Stand patient 3 times a day  - Ambulate patient 3 times a day  - Out of bed to chair 3 times a day   - Out of bed for meals 3 times a day  - Out of bed for toileting  - Record patient progress and toleration of activity level   Outcome: Progressing     Problem: DISCHARGE PLANNING  Goal: Discharge to home or other facility with appropriate resources  Description: INTERVENTIONS:  - Identify barriers to discharge w/patient and caregiver  - Arrange for needed discharge resources and transportation as appropriate  - Identify discharge learning needs (meds, wound care, etc.)  - Arrange for interpretive services to assist at discharge as needed  - Refer to Case Management Department for coordinating discharge planning if the patient needs post-hospital services based on physician/advanced practitioner order or complex needs related to functional status, cognitive ability, or social support system  Outcome: Progressing     Problem: Knowledge Deficit  Goal: Patient/family/caregiver demonstrates understanding of disease process, treatment plan, medications, and discharge instructions  Description: Complete learning assessment and assess knowledge base.  Interventions:  - Provide teaching at level of understanding  - Provide teaching via preferred learning methods  Outcome: Progressing     Problem: RESPIRATORY - ADULT  Goal: Achieves optimal ventilation and oxygenation  Description: INTERVENTIONS:  - Assess for changes in respiratory status  - Assess for changes in mentation and behavior  - Position to facilitate oxygenation and minimize respiratory effort  - Oxygen administered by appropriate delivery if ordered  - Initiate smoking cessation education as  indicated  - Encourage broncho-pulmonary hygiene including cough, deep breathe, Incentive Spirometry  - Assess the need for suctioning and aspirate as needed  - Assess and instruct to report SOB or any respiratory difficulty  - Respiratory Therapy support as indicated  Outcome: Progressing     Problem: Nutrition/Hydration-ADULT  Goal: Nutrient/Hydration intake appropriate for improving, restoring or maintaining nutritional needs  Description: Monitor and assess patient's nutrition/hydration status for malnutrition. Collaborate with interdisciplinary team and initiate plan and interventions as ordered.  Monitor patient's weight and dietary intake as ordered or per policy. Utilize nutrition screening tool and intervene as necessary. Determine patient's food preferences and provide high-protein, high-caloric foods as appropriate.     INTERVENTIONS:  - Monitor oral intake, urinary output, labs, and treatment plans  - Assess nutrition and hydration status and recommend course of action  - Evaluate amount of meals eaten  - Assist patient with eating if necessary   - Allow adequate time for meals  - Recommend/ encourage appropriate diets, oral nutritional supplements, and vitamin/mineral supplements  - Order, calculate, and assess calorie counts as needed  - Recommend, monitor, and adjust tube feedings and TPN/PPN based on assessed needs  - Assess need for intravenous fluids  - Provide specific nutrition/hydration education as appropriate  - Include patient/family/caregiver in decisions related to nutrition  Outcome: Progressing

## 2024-03-05 NOTE — ASSESSMENT & PLAN NOTE
Urine culture showing lactobacillus will discontinue IV Rocephin and placed on 3 more days of oral Keflex for now due to COPD exacerbation

## 2024-03-05 NOTE — ASSESSMENT & PLAN NOTE
Patient does not use oxygen at baseline.-currently using 2-3 L of oxygen  -pneumonia/COPD exacerbation secondary to pneumonia  Continue supplemental oxygen, respiratory protocol, continue treatment for pneumonia and COPD  COVID/flu panel negative  CT PE negative for PE . Does have pulmonary fibrosis still.  Will do ambulatory O2 requirement study tomorrow.  Appreciate pulmonary input continue tapering steroids

## 2024-03-05 NOTE — ASSESSMENT & PLAN NOTE
Recently evaluated by  [facility], status post cystoscopy-did not reveal any evidence of recurrent bladder cancer.  Per urology, patient placed on Macrobid for recurrent UTI, continue  Ultrasound of kidney bladder reviewed

## 2024-03-05 NOTE — CONSULTS
Consultation - Pulmonary Medicine   Moriah Phillips 74 y.o. female MRN: [id number]  Unit/Bed#: -01 Encounter: [id number]      Assessment/Plan:    Acute hypoxic respiratory failure  Emphysema/suspected COPD of unknown severity with acute exacerbation  ILD  Acute bronchitis versus community-acquired pneumonia  ZINA  PRESTON-resolved  Acute cystitis  Nicotine dependence    Pulmonary recommendations:    Weaned to 2 L nasal cannula in my presence without desaturations.  Titrate oxygen to maintain SpO2 greater than or equal to 88%.  Pulmonary toilet:  Increase activity as tolerated, out of bed as tolerated, cough and deep breathing exercises encourage, incentive spirometry q.1 hour  Will need home O2 eval prior to discharge.  Continue CPAP during all hours of sleep.  Continued antibiotics are not indicated from a pulmonary perspective given procalcitonin and normal WBC.  Defer antibiotic management however to primary team in the setting of acute cystitis.  Continue Solu-Medrol 40 mg IV twice daily today.  Start prednisone 40 mg p.o. daily tomorrow and taper by 10 mg every 3 days until completion.  While inpatient, continue Pulmicort twice daily, Xopenex/Atrovent nebs 3 times daily.   At discharge, resume Trelegy 1 puff daily, albuterol HFA.  Also get patient nebulizer with supplies along with albuterol nebulized solution every 6 hours as needed.  Would benefit from outpatient pulmonary follow-up with PFTs in the future.  Smoking cessation encouraged at bedside.  Recommend continue nicotine replacement therapy.  Also recommended starting Chantix while inpatient and continuing this at discharge. Reviewed SE in detail with patient.     History of Present Illness   Physician Requesting Consult: Shayla Cornejo MD  Reason for Consult / Principal Problem: COPD, pulmonary fibrosis  Hx and PE limited by: n/a  Chief Complaint: shortness of breath   HPI: Moriah Phillips is a 74 y.o.  female who presented to [facility]  [place] with complaints of shortness of breath.  Patient has past medical is positive for COPD, hypertension, hyperlipidemia, bladder cancer, nicotine dependence, ZINA on CPAP.  Patient reports a week of worsening dyspnea on exertion, sore throat, cough that was dry, increased wheezing and chest tightness.  Patient denies sick contact.  Denies fevers chills or chest pain.  Denies GI symptoms.  Presented to the ED 3/1/2024 for above-mentioned symptoms.  She was noted to be hypoxic requiring 3 L nasal cannula.  Laboratory workup failed mildly elevated D-dimer creatinine and BUN consistent with PRESTON.  Evidence of leukocytosis or elevated procalcitonin.  Chest imaging was negative for PE but did show severe emphysema, areas of subpleural reticulation, atelectasis and a right lower lobe infiltrate along with mediastinal and hilar adenopathy likely reactive.  Pulmonary was consulted to help manage this and hypoxia.  Presently, patient feels that she is made significant improvements and is requesting to go home.    From a pulmonary standpoint, reports a history of COPD.  She is maintained on Trelegy and as needed albuterol but she recently feels that they do not help her.  Apparently, patient does not use Trelegy on a consistent basis.  She does not use oxygen at home but she does have CPAP for which she reports compliance every night.  She has not is a current everyday smoker stating that she smoked a pack a day prior to admission for the last 57 years.  Patient has intentions on complete cessation once she leaves the hospital.  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  No sick contacts.  Denies recent travel.  Denies prior exposure.    Inpatient consult to Pulmonology  Consult performed by: Jose Doran PA-C  Consult ordered by: Kalen Henderson MD          Review of Systems   All other systems reviewed and are negative.      Historical Information   Past Medical History:   Diagnosis  Date    Acute UTI 2014    Arthritis 2016    Bladder cancer (HCC)     COPD (chronic obstructive pulmonary disease) (HCC) 2016    CPAP (continuous positive airway pressure) dependence     Dependence on nicotine from cigarettes 2016    Depression 2016    Dupuytren's disease of palm 2018    Dyspnea on exertion     GERD (gastroesophageal reflux disease)     Heart murmur     Hyperlipidemia     Hypertension     Hypokalemia 2016    Left ventricular hypertrophy     Mixed stress and urge urinary incontinence     Non-rheumatic mitral regurgitation 2023    Osteoarthritis 2016    left knee    Osteopenia     Panlobular emphysema (HCC)     Recurrent UTI     RLS (restless legs syndrome) 2016    Sleep apnea     Sleep apnea 2023     Past Surgical History:   Procedure Laterality Date    CATARACT EXTRACTION Bilateral     CYSTOSCOPY      ILEOSTOMY      HI CYSTOURETHROSCOPY N/A 11/17/2023    Procedure: CYSTOSCOPY  bilateral retrograde pyelograms;  Surgeon: Ryan Saab MD;  Location:  MAIN OR;  Service: Urology    REPLACEMENT TOTAL KNEE BILATERAL      TONSILLECTOMY       Social History   Social History     Substance and Sexual Activity   Alcohol Use Yes    Alcohol/week: 2.0 standard drinks of alcohol    Types: 2 Standard drinks or equivalent per week    Comment: Occasionally     Social History     Substance and Sexual Activity   Drug Use Not Currently     Social History     Tobacco Use   Smoking Status Every Day    Current packs/day: 1.00    Average packs/day: 1 pack/day for 50.0 years (50.0 ttl pk-yrs)    Types: Cigarettes   Smokeless Tobacco Never     E-Cigarette/Vaping    E-Cigarette Use Never User      E-Cigarette/Vaping Substances    Nicotine No     THC No     CBD No     Flavoring No     Other No     Unknown No      Occupational History: retired from clothing factory    Family History: History reviewed. No pertinent family history.    Meds/Allergies   all current active meds have been reviewed, pertinent pulmonary meds have been  reviewed, and current meds:   Current Facility-Administered Medications   Medication Dose Route Frequency    acetaminophen (TYLENOL) tablet 650 mg  650 mg Oral Q6H PRN    aspirin (ECOTRIN LOW STRENGTH) EC tablet 81 mg  81 mg Oral Daily    atorvastatin (LIPITOR) tablet 40 mg  40 mg Oral Daily With Dinner    budesonide (PULMICORT) inhalation solution 0.5 mg  0.5 mg Nebulization Q12H    cefpodoxime (VANTIN) tablet 400 mg  400 mg Oral BID With Meals    docusate sodium (COLACE) capsule 100 mg  100 mg Oral BID    guaiFENesin (MUCINEX) 12 hr tablet 600 mg  600 mg Oral BID    heparin (porcine) subcutaneous injection 5,000 Units  5,000 Units Subcutaneous Q8H CATE    ipratropium (ATROVENT) 0.02 % inhalation solution 0.5 mg  0.5 mg Nebulization TID    levalbuterol (XOPENEX) inhalation solution 1.25 mg  1.25 mg Nebulization Q6H    methylPREDNISolone sodium succinate (Solu-MEDROL) injection 40 mg  40 mg Intravenous Q12H CATE    metoprolol succinate (TOPROL-XL) 24 hr tablet 25 mg  25 mg Oral Daily    nicotine (NICODERM CQ) 14 mg/24hr TD 24 hr patch 1 patch  1 patch Transdermal Daily    nitrofurantoin (MACROBID) extended-release capsule 100 mg  100 mg Oral Daily    ondansetron (ZOFRAN) injection 4 mg  4 mg Intravenous Q6H PRN    pantoprazole (PROTONIX) EC tablet 20 mg  20 mg Oral Early Morning    phenazopyridine (PYRIDIUM) tablet 100 mg  100 mg Oral TID With Meals    polyethylene glycol (MIRALAX) packet 17 g  17 g Oral Daily    potassium chloride (Klor-Con M10) CR tablet 10 mEq  10 mEq Oral Daily    pramipexole (MIRAPEX) tablet 0.5 mg  0.5 mg Oral BID    [START ON 3/6/2024] predniSONE tablet 40 mg  40 mg Oral Daily    sertraline (ZOLOFT) tablet 100 mg  100 mg Oral Daily    varenicline (CHANTIX) tablet 0.5 mg  0.5 mg Oral Daily    Followed by    [START ON 3/8/2024] varenicline (CHANTIX) tablet 0.5 mg  0.5 mg Oral BID    Followed by    [START ON 3/12/2024] varenicline (CHANTIX) tablet 1 mg  1 mg Oral BID       Allergies   Allergen  "Reactions    Penicillins Hives    Morphine GI Intolerance     n, v       Objective   Vitals: Blood pressure 143/73, pulse 84, temperature 97.7 °F (36.5 °C), temperature source Temporal, resp. rate 18, height 5' 4\" (1.626 m), weight 90.3 kg (199 lb), SpO2 90%.2L NC  ,Body mass index is 34.16 kg/m².  No intake or output data in the 24 hours ending 03/05/24 1438  Invasive Devices       Peripheral Intravenous Line  Duration             Peripheral IV 03/05/24 Dorsal (posterior);Right Wrist <1 day                    Physical Exam  Vitals and nursing note reviewed.   Constitutional:       General: She is not in acute distress.     Appearance: Normal appearance.   HENT:      Head: Normocephalic and atraumatic.      Mouth/Throat:      Mouth: Mucous membranes are moist.      Pharynx: Oropharynx is clear.   Cardiovascular:      Rate and Rhythm: Normal rate and regular rhythm.      Heart sounds: No murmur heard.  Pulmonary:      Effort: Pulmonary effort is normal. No respiratory distress.      Breath sounds: Wheezing present. No rhonchi or rales.   Musculoskeletal:      Cervical back: Normal range of motion.   Skin:     General: Skin is warm and dry.   Neurological:      General: No focal deficit present.      Mental Status: She is alert. Mental status is at baseline.   Psychiatric:         Mood and Affect: Mood normal.         Behavior: Behavior normal.         Lab Results: I have personally reviewed pertinent lab results., ABG: No results found for: \"PHART\", \"KSF6OPI\", \"PO2ART\", \"QUI7KNC\", \"K4QXBQVI\", \"BEART\", \"SOURCE\", BNP: No results found for: \"BNP\", CBC: No results found for: \"WBC\", \"HGB\", \"HCT\", \"MCV\", \"PLT\", \"ADJUSTEDWBC\", \"RBC\", \"MCH\", \"MCHC\", \"RDW\", \"MPV\", \"NRBC\", CMP: No results found for: \"SODIUM\", \"K\", \"CL\", \"CO2\", \"ANIONGAP\", \"BUN\", \"CREATININE\", \"GLUCOSE\", \"CALCIUM\", \"AST\", \"ALT\", \"ALKPHOS\", \"PROT\", \"BILITOT\", \"EGFR\", PT/INR: No results found for: \"PT\", \"INR\", Troponin: No results found for: \"TROPONINI\"    VBG: " "7.376/48.8/40.5/28.0    Lactic Acid: 1.1    Procalcitonin: 0.16->0.08    Flu/COVID/RSV PCR: WNL    Culture Data: BC x2 NGTD at 72 hours     D-Dimer: 0.73    BNP: 38    Imaging Studies: I have personally reviewed pertinent reports.   and I have personally reviewed pertinent films in PACS     IMPRESSION:     1.  No pulmonary embolism.  2.  Moderate-to-severe emphysema with possible concomitant interstitial lung disease (combined pulmonary fibrosis and emphysema).  3.  Few small opacities in the periphery of the right lower lobe are likely focal mild atelectasis. Pneumonia is to be determined clinical grounds.  4.  Few mildly enlarged mediastinal and hilar lymph nodes may be reactive (due to suspected interstitial lung disease or less likely due to infection). However, given the emphysema, contrast-enhanced chest CT is advised in 6 months to confirm the absence   of a neoplastic process    EKG, Pathology, and Other Studies: I have personally reviewed pertinent reports.   and I have personally reviewed pertinent films in PACS     Echo 3/5/2024  EF 65%.  Diastolic function abnormal.  Mitral regurgitation but unable to assess severity.  Trace tricuspid regurgitation.    Pulmonary Results (PFTs, PSG): none to review      Code Status: Level 1 - Full Code    Portions of the record may have been created with voice recognition software.  Occasional wrong word or \"sound a like\" substitutions may have occurred due to the inherent limitations of voice recognition software.  Read the chart carefully and recognize, using context, where substitutions have occurred.    "

## 2024-03-05 NOTE — ASSESSMENT & PLAN NOTE
Imaging shows right lower lobe pneumonia  on IV antibiotic, IV hydration, antiemetic, antipyretics. Switch to oral antibiotics  Respiratory protocol, follow blood culture, urine Legionella, pneumonia, procalcitonin  COVID/flu panel negative

## 2024-03-05 NOTE — PLAN OF CARE
Problem: Potential for Falls  Goal: Patient will remain free of falls  Description: INTERVENTIONS:  - Educate patient/family on patient safety including physical limitations  - Instruct patient to call for assistance with activity   - Consult OT/PT to assist with strengthening/mobility   - Keep Call bell within reach  - Keep bed low and locked with side rails adjusted as appropriate  - Keep care items and personal belongings within reach  - Initiate and maintain comfort rounds  Outcome: Progressing     Problem: PAIN - ADULT  Goal: Verbalizes/displays adequate comfort level or baseline comfort level  Description: Interventions:  - Encourage patient to monitor pain and request assistance  - Assess pain using appropriate pain scale  - Administer analgesics based on type and severity of pain and evaluate response  - Implement non-pharmacological measures as appropriate and evaluate response  - Consider cultural and social influences on pain and pain management  - Notify physician/advanced practitioner if interventions unsuccessful or patient reports new pain  Outcome: Progressing     Problem: INFECTION - ADULT  Goal: Absence or prevention of progression during hospitalization  Description: INTERVENTIONS:  - Assess and monitor for signs and symptoms of infection  - Monitor lab/diagnostic results  - Monitor all insertion sites, i.e. indwelling lines, tubes, and drains  - Monitor endotracheal if appropriate and nasal secretions for changes in amount and color  - [place] appropriate cooling/warming therapies per order  - Administer medications as ordered  - Instruct and encourage patient and family to use good hand hygiene technique  - Identify and instruct in appropriate isolation precautions for identified infection/condition  Outcome: Progressing     Problem: SAFETY ADULT  Goal: Patient will remain free of falls  Description: INTERVENTIONS:  - Educate patient/family on patient safety including physical limitations  -  Instruct patient to call for assistance with activity   - Consult OT/PT to assist with strengthening/mobility   - Keep Call bell within reach  - Keep bed low and locked with side rails adjusted as appropriate  - Keep care items and personal belongings within reach  - Initiate and maintain comfort rounds  Outcome: Progressing  Goal: Maintain or return to baseline ADL function  Description: INTERVENTIONS:  -  Assess patient's ability to carry out ADLs; assess patient's baseline for ADL function and identify physical deficits which impact ability to perform ADLs (bathing, care of mouth/teeth, toileting, grooming, dressing, etc.)  - Assess/evaluate cause of self-care deficits   - Assess range of motion  - Assess patient's mobility; develop plan if impaired  - Assess patient's need for assistive devices and provide as appropriate  - Encourage maximum independence but intervene and supervise when necessary  - Involve family in performance of ADLs  - Assess for home care needs following discharge   - Consider OT consult to assist with ADL evaluation and planning for discharge  - Provide patient education as appropriate  Outcome: Progressing  Goal: Maintains/Returns to pre admission functional level  Description: INTERVENTIONS:  - Perform AM-PAC 6 Click Basic Mobility/ Daily Activity assessment daily.  - Set and communicate daily mobility goal to care team and patient/family/caregiver.   - Collaborate with rehabilitation services on mobility goals if consulted  - Perform Range of Motion 3 times a day.  - Reposition patient every 2 hours.  - Dangle patient 2 times a day  - Stand patient 2 times a day  - Ambulate patient 2 times a day  - Out of bed to chair 2 times a day   - Out of bed for meals 2 times a day  - Out of bed for toileting  - Record patient progress and toleration of activity level   Outcome: Progressing     Problem: DISCHARGE PLANNING  Goal: Discharge to home or other facility with appropriate  resources  Description: INTERVENTIONS:  - Identify barriers to discharge w/patient and caregiver  - Arrange for needed discharge resources and transportation as appropriate  - Identify discharge learning needs (meds, wound care, etc.)  - Arrange for interpretive services to assist at discharge as needed  - Refer to Case Management Department for coordinating discharge planning if the patient needs post-hospital services based on physician/advanced practitioner order or complex needs related to functional status, cognitive ability, or social support system  Outcome: Progressing     Problem: Knowledge Deficit  Goal: Patient/family/caregiver demonstrates understanding of disease process, treatment plan, medications, and discharge instructions  Description: Complete learning assessment and assess knowledge base.  Interventions:  - Provide teaching at level of understanding  - Provide teaching via preferred learning methods  Outcome: Progressing     Problem: RESPIRATORY - ADULT  Goal: Achieves optimal ventilation and oxygenation  Description: INTERVENTIONS:  - Assess for changes in respiratory status  - Assess for changes in mentation and behavior  - Position to facilitate oxygenation and minimize respiratory effort  - Oxygen administered by appropriate delivery if ordered  - Initiate smoking cessation education as indicated  - Encourage broncho-pulmonary hygiene including cough, deep breathe, Incentive Spirometry  - Assess the need for suctioning and aspirate as needed  - Assess and instruct to report SOB or any respiratory difficulty  - Respiratory Therapy support as indicated  Outcome: Progressing     Problem: Nutrition/Hydration-ADULT  Goal: Nutrient/Hydration intake appropriate for improving, restoring or maintaining nutritional needs  Description: Monitor and assess patient's nutrition/hydration status for malnutrition. Collaborate with interdisciplinary team and initiate plan and interventions as ordered.  Monitor  patient's weight and dietary intake as ordered or per policy. Utilize nutrition screening tool and intervene as necessary. Determine patient's food preferences and provide high-protein, high-caloric foods as appropriate.     INTERVENTIONS:  - Monitor oral intake, urinary output, labs, and treatment plans  - Assess nutrition and hydration status and recommend course of action  - Evaluate amount of meals eaten  - Assist patient with eating if necessary   - Allow adequate time for meals  - Recommend/ encourage appropriate diets, oral nutritional supplements, and vitamin/mineral supplements  - Order, calculate, and assess calorie counts as needed  - Recommend, monitor, and adjust tube feedings and TPN/PPN based on assessed needs  - Assess need for intravenous fluids  - Provide specific nutrition/hydration education as appropriate  - Include patient/family/caregiver in decisions related to nutrition  Outcome: Progressing

## 2024-03-05 NOTE — ASSESSMENT & PLAN NOTE
Aggravated secondary to pneumonia  Continue treatment for pneumonia, inhaled nebulizer, IV steroid, respiratory protocol  Check CTA PE negative, per imaging patient does have fibrosis.  2decho shows normal EF.  Appreciate pulmonary input.  Switch from IV Rocephin to oral vantin

## 2024-03-06 VITALS
HEIGHT: 64 IN | WEIGHT: 199 LBS | DIASTOLIC BLOOD PRESSURE: 80 MMHG | OXYGEN SATURATION: 90 % | BODY MASS INDEX: 33.97 KG/M2 | TEMPERATURE: 97 F | SYSTOLIC BLOOD PRESSURE: 150 MMHG | HEART RATE: 84 BPM | RESPIRATION RATE: 18 BRPM

## 2024-03-06 LAB
BACTERIA BLD CULT: NORMAL
BACTERIA BLD CULT: NORMAL
DME PARACHUTE DELIVERY DATE ACTUAL: NORMAL
DME PARACHUTE DELIVERY DATE EXPECTED: NORMAL
DME PARACHUTE DELIVERY DATE REQUESTED: NORMAL
DME PARACHUTE ITEM DESCRIPTION: NORMAL
DME PARACHUTE ORDER STATUS: NORMAL
DME PARACHUTE SUPPLIER NAME: NORMAL
DME PARACHUTE SUPPLIER PHONE: NORMAL

## 2024-03-06 PROCEDURE — 94640 AIRWAY INHALATION TREATMENT: CPT

## 2024-03-06 PROCEDURE — 94761 N-INVAS EAR/PLS OXIMETRY MLT: CPT

## 2024-03-06 PROCEDURE — 94760 N-INVAS EAR/PLS OXIMETRY 1: CPT

## 2024-03-06 PROCEDURE — 99239 HOSP IP/OBS DSCHRG MGMT >30: CPT | Performed by: FAMILY MEDICINE

## 2024-03-06 RX ORDER — IPRATROPIUM BROMIDE AND ALBUTEROL SULFATE 2.5; .5 MG/3ML; MG/3ML
3 SOLUTION RESPIRATORY (INHALATION) 4 TIMES DAILY
Qty: 360 ML | Refills: 0 | Status: SHIPPED | OUTPATIENT
Start: 2024-03-06 | End: 2024-04-05

## 2024-03-06 RX ORDER — NICOTINE 21 MG/24HR
1 PATCH, TRANSDERMAL 24 HOURS TRANSDERMAL DAILY
Qty: 28 PATCH | Refills: 0 | Status: SHIPPED | OUTPATIENT
Start: 2024-03-07

## 2024-03-06 RX ORDER — LEVALBUTEROL INHALATION SOLUTION 1.25 MG/3ML
1.25 SOLUTION RESPIRATORY (INHALATION)
Status: DISCONTINUED | OUTPATIENT
Start: 2024-03-06 | End: 2024-03-06 | Stop reason: HOSPADM

## 2024-03-06 RX ORDER — PREDNISONE 20 MG/1
TABLET ORAL DAILY
Qty: 15 TABLET | Refills: 0 | Status: SHIPPED | OUTPATIENT
Start: 2024-03-07 | End: 2024-03-17

## 2024-03-06 RX ORDER — CEFPODOXIME PROXETIL 200 MG/1
400 TABLET, FILM COATED ORAL 2 TIMES DAILY WITH MEALS
Qty: 16 TABLET | Refills: 0 | Status: SHIPPED | OUTPATIENT
Start: 2024-03-06 | End: 2024-03-10

## 2024-03-06 RX ADMIN — HEPARIN SODIUM 5000 UNITS: 5000 INJECTION INTRAVENOUS; SUBCUTANEOUS at 00:58

## 2024-03-06 RX ADMIN — GUAIFENESIN 600 MG: 600 TABLET ORAL at 08:25

## 2024-03-06 RX ADMIN — HEPARIN SODIUM 5000 UNITS: 5000 INJECTION INTRAVENOUS; SUBCUTANEOUS at 08:26

## 2024-03-06 RX ADMIN — ASPIRIN 81 MG: 81 TABLET, COATED ORAL at 08:26

## 2024-03-06 RX ADMIN — PRAMIPEXOLE DIHYDROCHLORIDE 0.5 MG: 0.5 TABLET ORAL at 08:25

## 2024-03-06 RX ADMIN — POTASSIUM CHLORIDE 10 MEQ: 750 TABLET, EXTENDED RELEASE ORAL at 08:26

## 2024-03-06 RX ADMIN — PHENAZOPYRIDINE 100 MG: 100 TABLET ORAL at 08:25

## 2024-03-06 RX ADMIN — PREDNISONE 40 MG: 20 TABLET ORAL at 08:25

## 2024-03-06 RX ADMIN — IPRATROPIUM BROMIDE 0.5 MG: 0.5 SOLUTION RESPIRATORY (INHALATION) at 07:22

## 2024-03-06 RX ADMIN — LEVALBUTEROL HYDROCHLORIDE 1.25 MG: 1.25 SOLUTION RESPIRATORY (INHALATION) at 07:22

## 2024-03-06 RX ADMIN — METOPROLOL SUCCINATE 25 MG: 25 TABLET, EXTENDED RELEASE ORAL at 08:25

## 2024-03-06 RX ADMIN — NICOTINE 1 PATCH: 14 PATCH, EXTENDED RELEASE TRANSDERMAL at 08:27

## 2024-03-06 RX ADMIN — SERTRALINE 100 MG: 100 TABLET, FILM COATED ORAL at 08:25

## 2024-03-06 RX ADMIN — VARENICLINE TARTRATE 0.5 MG: 0.5 TABLET, FILM COATED ORAL at 08:27

## 2024-03-06 RX ADMIN — PHENAZOPYRIDINE 100 MG: 100 TABLET ORAL at 12:55

## 2024-03-06 RX ADMIN — CEFPODOXIME PROXETIL 400 MG: 200 TABLET, FILM COATED ORAL at 08:27

## 2024-03-06 RX ADMIN — BUDESONIDE 0.5 MG: 0.5 INHALANT ORAL at 07:22

## 2024-03-06 RX ADMIN — PANTOPRAZOLE SODIUM 20 MG: 20 TABLET, DELAYED RELEASE ORAL at 06:33

## 2024-03-06 RX ADMIN — NITROFURANTOIN (MONOHYDRATE/MACROCRYSTALS) 100 MG: 75; 25 CAPSULE ORAL at 08:26

## 2024-03-06 NOTE — CASE MANAGEMENT
Case Management Discharge Planning Note    Patient name Moriah Phillips  Location /-01 MRN [id number]  : 1950 Date 3/6/2024       Current Admission Date: 3/1/2024  Current Admission Diagnosis:Acute respiratory failure with hypoxia (HCC)   Patient Active Problem List    Diagnosis Date Noted    Acute cystitis 2024    Acute respiratory failure with hypoxia (HCC) 2024    Community acquired pneumonia of left lower lobe of lung 2024    PRESTON (acute kidney injury) (HCC) 2024    GERD (gastroesophageal reflux disease) 2023    Hypertension 2023    Hyperlipidemia 2023    CPAP (continuous positive airway pressure) dependence 2023    Urinary urgency 10/25/2023    Dysuria 10/25/2023    Malignant neoplasm of urinary bladder, unspecified site ([facility]) 10/25/2023    Chronic obstructive pulmonary disease with acute lower respiratory infection ([facility])     Arthritis 2016    RLS (restless legs syndrome) 2016    Depression 2016      LOS (days): 5  Geometric Mean LOS (GMLOS) (days): 4.4  Days to GMLOS:-0.4     OBJECTIVE:  Risk of Unplanned Readmission Score: 11.69         Current admission status: Inpatient   Preferred Pharmacy:   [address] [address]  Phone: [phone] Fax: [phone]    Primary Care Provider: Devin Singh DO    Primary Insurance: MEDICARE  Secondary Insurance:  FOR LIFE    DISCHARGE DETAILS:    Discharge planning discussed with:: patient  Freedom of Choice: Yes  Comments - Freedom of Choice: [place] referral to [facility] for home O2     Were Treatment Team discharge recommendations reviewed with patient/caregiver?: Yes  Did patient/caregiver verbalize understanding of patient care needs?: Yes  Were patient/caregiver advised of the risks associated with not following Treatment Team discharge recommendations?: Yes         Requested Home Health Care          Is the patient interested in HHC at discharge?: No    DME Referral Provided  Referral made for DME?: Yes  DME referral completed for the following items:: Home Oxygen concentrator, Portable Oxygen tanks  DME Supplier Name:: [employer]    Other Referral/Resources/Interventions Provided:  Interventions: DME  Referral Comments: [facility]    Would you like to participate in our [facility] service program?  : No - Declined    Treatment Team Recommendation: Home  Discharge Destination Plan:: Home  Transport at Discharge : Family                             IMM Given (Date):: 03/06/24  IMM Given to:: Patient  Family notified:: appeal rights provided to patient        1000 CM met with patient to review discharge today with Home O2 recommendations for 2L O2 at rest and 4L upon exertion. CM discussed with patient [facility] utilizes [facility] as DME provider but patient has choice of providers. Patient agreeable to [facility] referral for home O2.     CM spoke to patient at the bedside, reviewed DC IMM with patient and informed that patient can stay an additional 4 hours for reconsidering appealing the discharge as the medicare rights were review on the day of discharge. Pt verbalized understanding and feels ready to go home and does not intend to stay 4 hours to reconsider. IMM placed in bin for filing.     CM submitted [place] referral for Home O2.

## 2024-03-06 NOTE — ASSESSMENT & PLAN NOTE
Patient does not use oxygen at baseline.-currently using 2-3 L of oxygen  -pneumonia/COPD exacerbation secondary to pneumonia  Continue supplemental oxygen, respiratory protocol, continue treatment for pneumonia and COPD  COVID/flu panel negative  CT PE negative for PE . Does have pulmonary fibrosis still.  ambulatory O2 requirement study shows she needs 2 L at rest and 4 L with activity.  Appreciate pulmonary input continue tapering steroids  And outpatient follow-up with pulmonology

## 2024-03-06 NOTE — DISCHARGE SUMMARY
[facility]  Discharge- Moriah Palmersert 1950, 74 y.o. female MRN: [id number]  Unit/Bed#: -Gautam Encounter: [id number]  Primary Care Provider: Devin Singh DO   Date and time admitted to hospital: 3/1/2024  2:15 PM    * Acute respiratory failure with hypoxia (HCC)  Assessment & Plan  Patient does not use oxygen at baseline.-currently using 2-3 L of oxygen  -pneumonia/COPD exacerbation secondary to pneumonia  Continue supplemental oxygen, respiratory protocol, continue treatment for pneumonia and COPD  COVID/flu panel negative  CT PE negative for PE . Does have pulmonary fibrosis still.  ambulatory O2 requirement study shows she needs 2 L at rest and 4 L with activity.  Appreciate pulmonary input continue tapering steroids  And outpatient follow-up with pulmonology    Acute cystitis  Assessment & Plan  Urine culture showing lactobacillus will discontinue IV Rocephin and placed on 3 more days of oral Keflex for now due to COPD exacerbation    PRESTON (acute kidney injury) (HCC)  Assessment & Plan  Creatinine is 1.87  Baseline creatinine ranges between 0.9-1.0  Resolved    Community acquired pneumonia of left lower lobe of lung  Assessment & Plan  Imaging shows right lower lobe pneumonia  on IV antibiotic, IV hydration, antiemetic, antipyretics. Switch to oral antibiotics  Respiratory protocol, follow blood culture, urine Legionella, pneumonia, procalcitonin  COVID/flu panel negative    CPAP (continuous positive airway pressure) dependence  Assessment & Plan  Continue CPAP-patient refused to use CPAP.    Chronic obstructive pulmonary disease with acute lower respiratory infection (HCC)  Assessment & Plan  Aggravated secondary to pneumonia  Continue treatment for pneumonia, inhaled nebulizer, IV steroid, respiratory protocol  CTA PE negative, per imaging patient does have fibrosis.  2decho shows normal EF.  Appreciate pulmonary input.  Switch from IV Rocephin to oral vantin    Malignant  neoplasm of urinary bladder, unspecified site (HCC)  Assessment & Plan  Recently evaluated by [facility] urology, status post cystoscopy-did not reveal any evidence of recurrent bladder cancer.  Per urology, patient placed on Macrobid for recurrent UTI, continue  Ultrasound of kidney bladder reviewed    Discharging Physician / Practitioner: Shayla Cornejo MD  PCP: Devin Singh DO  Admission Date:   Admission Orders (From admission, onward)       Ordered        03/01/24 1610  Inpatient Admission  Once                          Discharge Date: 03/06/24    Medical Problems       Resolved Problems  Date Reviewed: 3/6/2024            Resolved    History of bladder cancer 3/1/2024     Resolved by  Kalen Henderson MD          Consultations During Hospital Stay:  pulm    Procedures Performed:   none    Significant Findings / Test Results:   CTA chest pe study    Result Date: 3/3/2024  Impression: 1.  No pulmonary embolism. 2.  Moderate-to-severe emphysema with possible concomitant interstitial lung disease (combined pulmonary fibrosis and emphysema). 3.  Few small opacities in the periphery of the right lower lobe are likely focal mild atelectasis. Pneumonia is to be determined clinical grounds. 4.  Few mildly enlarged mediastinal and hilar lymph nodes may be reactive (due to suspected interstitial lung disease or less likely due to infection). However, given the emphysema, contrast-enhanced chest CT is advised in 6 months to confirm the absence  of a neoplastic process The study was marked in EPIC for immediate notification. Workstation performed: [id number]     US kidney and bladder    Result Date: 3/2/2024  Impression: Normal. Workstation performed: [id number]     XR chest 1 view portable    Result Date: 3/1/2024  Impression: Left lower lobe atelectasis versus infiltrate. Pneumonia is not excluded. Follow-up in 4 to 6 weeks time is advised. The study was marked in EPIC for immediate notification. Workstation performed:  "[id number]      Incidental Findings:   none    Test Results Pending at Discharge (will require follow up):   none     Outpatient Tests Requested:  none    Complications:  none    Reason for Admission: acute respiratory failure with hypoxia    Hospital Course:     Moriah Phillips is a 74 y.o. female patient who originally presented to the hospital on 3/1/2024 due to respiratory failure with hypoxia and acute cystitis, acute kidney injury and community-acquired pneumonia of the left lower lobe.  Patient was treated with antibiotics gentle hydration steroids and is now clinically improving however requires 2 L of oxygen at rest and 4 L with activity.  Will place her on tapering steroids and discharge her home with outpatient pulmonology follow-up and repeat labs in 2 weeks      Please see above list of diagnoses and related plan for additional information.     Condition at Discharge: fair     Discharge Day Visit / Exam:     Subjective: Denies any chest pain or shortness of breath today feeling better wants to go home  Vitals: Blood Pressure: 150/80 (03/06/24 0700)  Pulse: 84 (03/04/24 2300)  Temperature: (!) 97 °F (36.1 °C) (03/06/24 0700)  Temp Source: Temporal (03/05/24 0735)  Respirations: 18 (03/05/24 0735)  Height: 5' 4\" (162.6 cm) (03/05/24 0735)  Weight - Scale: 90.3 kg (199 lb) (03/05/24 0735)  SpO2: 94 % (03/06/24 0722)  Exam:   Physical Exam  Vitals and nursing note reviewed.   Constitutional:       Appearance: Normal appearance.   HENT:      Head: Normocephalic and atraumatic.      Right Ear: External ear normal.      Left Ear: External ear normal.      Nose: Nose normal.      Mouth/Throat:      Pharynx: Oropharynx is clear.   Cardiovascular:      Rate and Rhythm: Normal rate and regular rhythm.      Heart sounds: Normal heart sounds.   Pulmonary:      Effort: Pulmonary effort is normal.      Comments: Moderate air entry bilaterally with decreased breath sounds on b/l bases  Abdominal:      General: Bowel " sounds are normal.      Palpations: Abdomen is soft.      Tenderness: There is no abdominal tenderness.   Musculoskeletal:         General: Normal range of motion.      Cervical back: Normal range of motion and neck supple.   Skin:     General: Skin is warm and dry.      Capillary Refill: Capillary refill takes less than 2 seconds.   Neurological:      General: No focal deficit present.      Mental Status: She is alert and oriented to person, place, and time.   Psychiatric:         Mood and Affect: Mood normal.         Discussion with Family: will update    Discharge instructions/Information to patient and family:   See after visit summary for information provided to patient and family.      Provisions for Follow-Up Care:  See after visit summary for information related to follow-up care and any pertinent home health orders.      Disposition:     Home with VNA Services (Reminder: Complete face to face encounter)    For Discharges to [facility]:   Not Applicable to this Patient - Not Applicable to this Patient    Planned Readmission: none     Discharge Statement:  I spent 35 minutes discharging the patient. This time was spent on the day of discharge. I had direct contact with the patient on the day of discharge. Greater than 50% of the total time was spent examining patient, answering all patient questions, arranging and discussing plan of care with patient as well as directly providing post-discharge instructions.  Additional time then spent on discharge activities.    Discharge Medications:  See after visit summary for reconciled discharge medications provided to patient and family.      ** Please Note: This note has been constructed using a voice recognition system **

## 2024-03-06 NOTE — CASE MANAGEMENT
Case Management Discharge Planning Note    Patient name Moriah Phillips  Location /-01 MRN [id number]  : 1950 Date 3/6/2024       Current Admission Date: 3/1/2024  Current Admission Diagnosis:Acute respiratory failure with hypoxia (HCC)   Patient Active Problem List    Diagnosis Date Noted    Acute cystitis 2024    Acute respiratory failure with hypoxia (HCC) 2024    Community acquired pneumonia of left lower lobe of lung 2024    PRESTON (acute kidney injury) ([facility]) 2024    GERD (gastroesophageal reflux disease) 2023    Hypertension 2023    Hyperlipidemia 2023    CPAP (continuous positive airway pressure) dependence 2023    Urinary urgency 10/25/2023    Dysuria 10/25/2023    Malignant neoplasm of urinary bladder, unspecified site ([facility]) 10/25/2023    Chronic obstructive pulmonary disease with acute lower respiratory infection ([facility])     Arthritis 2016    RLS (restless legs syndrome) 2016    Depression 2016      LOS (days): 5  Geometric Mean LOS (GMLOS) (days): 4.4  Days to GMLOS:-0.5     OBJECTIVE:  Risk of Unplanned Readmission Score: 12.24         Current admission status: Inpatient   Preferred Pharmacy:   [address] [address]  Phone: [phone] Fax: [phone]    Primary Care Provider: Devin Singh DO    Primary Insurance: MEDICARE  Secondary Insurance:  FOR LIFE    DISCHARGE DETAILS:     CM met with patient and spouse to provide 2 O2 tanks for discharge. Spouse reported O2 delivery is to occur this evening to home.     CM informed patient it was learned nebulizer will cost $100 as patient was provided one from [facility]. Patient declined $100 cost indicating she does have one at home she hardly used.

## 2024-03-06 NOTE — ASSESSMENT & PLAN NOTE
Recently evaluated by  [facility], status post cystoscopy-did not reveal any evidence of recurrent bladder cancer.  Per urology, patient placed on Macrobid for recurrent UTI, continue  Ultrasound of kidney bladder reviewed

## 2024-03-06 NOTE — DISCHARGE INSTR - OTHER ORDERS
Your goal pulse oximetry is 88 - 92%.  If your pulse ox is <88 - rest for 5 minutes and take deep breaths through your nose with the oxygen in place.  Make sure your finger is warm (cold fingers will give falsely low readings).  After 5 minutes, recheck your pulse ox.  If your pulse ox continues to be below < 88% and you feel short of breath, rest, breathe through your nose and call your primary care physician or pulmonologist 24/7 (if after office hours the answering service will contact the on call physician who will call you back).  If you continue to feel excessively short of breath (much more than your normal breathing pattern), consider further evaluation in the emergency department - remember that a mask must be worn to enter any [facility] Emergency Department.  If your pulse ox continues to be below < 88% and you do not feel short of breath increase your oxygen flow by 1 liter at a time to achieve a reading between 88 and 92%.  If you are needing more than 2 liters higher than your normal flow for more than a few hours call your primary care physician or pulmonologist, even if you are not feeling short of breath.  If your pulse ox is >92% it is safe to turn down oxygen by 1 liter at a time to achieve a reading of 88-92%.     YOU ARE RECOMMENDED 2L O2 AT REST AND 4L O2 UPON EXERTION.

## 2024-03-06 NOTE — ASSESSMENT & PLAN NOTE
Aggravated secondary to pneumonia  Continue treatment for pneumonia, inhaled nebulizer, IV steroid, respiratory protocol  CTA PE negative, per imaging patient does have fibrosis.  2decho shows normal EF.  Appreciate pulmonary input.  Switch from IV Rocephin to oral vantin

## 2024-03-06 NOTE — RESPIRATORY THERAPY NOTE
Home Oxygen Qualifying Test     Patient name: Moriah Phillips        : 1950   Date of Test:  2024  Diagnosis:    Home Oxygen Test:    **Medicare Guidelines require item(s) 1-5 on all ambulatory patients or 1 and 2 on non-ambulatory patients.    1. Baseline SPO2 on Room Air at rest 85 %   If <= 88% on Room Air add O2 via NC to obtain SpO2 >=88%. If LPM needed, document LPM  needed to reach =>88%    SPO2 during exertion on Room Air   %  During exertion monitor SPO2. If SPO2 increases >=89%, do not add supplemental oxygen    SPO2 on Oxygen at Rest 2  % at  91 LPM    SPO2 during exertion on Oxygen   90 % at 4 LPM    Test performed during exertion activity.      [x]  Supplemental Home Oxygen is indicated.    []  Client does not qualify for home oxygen.    Respiratory Additional Notes- pt needed O2 increased to 4 LPM to maintain  spo2 >89% during ambulation    RT Julieta

## 2024-03-18 ENCOUNTER — HOSPITAL ENCOUNTER (OUTPATIENT)
Dept: RADIOLOGY | Facility: HOSPITAL | Age: 74
Discharge: HOME | End: 2024-03-18
Payer: MEDICARE

## 2024-03-18 ENCOUNTER — APPOINTMENT (OUTPATIENT)
Dept: LAB | Facility: HOSPITAL | Age: 74
End: 2024-03-18
Payer: MEDICARE

## 2024-03-18 DIAGNOSIS — J18.9 PNEUMONIA OF LEFT LOWER LOBE DUE TO INFECTIOUS ORGANISM: ICD-10-CM

## 2024-03-18 DIAGNOSIS — Z87.01 HX OF BACTERIAL PNEUMONIA: ICD-10-CM

## 2024-03-18 PROBLEM — Z87.440 HISTORY OF RECURRENT UTIS: Status: ACTIVE | Noted: 2024-03-18

## 2024-03-18 LAB
ALBUMIN SERPL BCP-MCNC: 3.9 G/DL (ref 3.5–5)
ALP SERPL-CCNC: 61 U/L (ref 34–104)
ALT SERPL W P-5'-P-CCNC: 33 U/L (ref 7–52)
ANION GAP SERPL CALCULATED.3IONS-SCNC: 6 MMOL/L (ref 4–13)
AST SERPL W P-5'-P-CCNC: 21 U/L (ref 13–39)
BILIRUB SERPL-MCNC: 0.77 MG/DL (ref 0.2–1)
BUN SERPL-MCNC: 30 MG/DL (ref 5–25)
CALCIUM SERPL-MCNC: 9.1 MG/DL (ref 8.4–10.2)
CHLORIDE SERPL-SCNC: 102 MMOL/L (ref 96–108)
CO2 SERPL-SCNC: 34 MMOL/L (ref 21–32)
CREAT SERPL-MCNC: 1.02 MG/DL (ref 0.6–1.3)
ERYTHROCYTE [DISTWIDTH] IN BLOOD BY AUTOMATED COUNT: 13 % (ref 11.6–15.1)
GFR SERPL CREATININE-BSD FRML MDRD: 55 ML/MIN/1.73SQ M
GLUCOSE P FAST SERPL-MCNC: 89 MG/DL (ref 65–99)
HCT VFR BLD AUTO: 44.4 % (ref 34.8–46.1)
HGB BLD-MCNC: 13.9 G/DL (ref 11.5–15.4)
MCH RBC QN AUTO: 30.3 PG (ref 26.8–34.3)
MCHC RBC AUTO-ENTMCNC: 31.3 G/DL (ref 31.4–37.4)
MCV RBC AUTO: 97 FL (ref 82–98)
PLATELET # BLD AUTO: 183 THOUSANDS/UL (ref 149–390)
PMV BLD AUTO: 9.2 FL (ref 8.9–12.7)
POTASSIUM SERPL-SCNC: 3.8 MMOL/L (ref 3.5–5.3)
PROT SERPL-MCNC: 6.1 G/DL (ref 6.4–8.4)
RBC # BLD AUTO: 4.59 MILLION/UL (ref 3.81–5.12)
SODIUM SERPL-SCNC: 142 MMOL/L (ref 135–147)
WBC # BLD AUTO: 11.61 THOUSAND/UL (ref 4.31–10.16)

## 2024-03-18 PROCEDURE — 80053 COMPREHEN METABOLIC PANEL: CPT

## 2024-03-18 PROCEDURE — 85027 COMPLETE CBC AUTOMATED: CPT

## 2024-03-18 PROCEDURE — 71046 X-RAY EXAM CHEST 2 VIEWS: CPT

## 2024-03-18 PROCEDURE — 36415 COLL VENOUS BLD VENIPUNCTURE: CPT

## 2024-03-18 RX ORDER — CEFPODOXIME PROXETIL 200 MG/1
TABLET, FILM COATED ORAL
COMMUNITY
Start: 2024-03-06 | End: 2024-03-28 | Stop reason: ALTCHOICE

## 2024-03-18 RX ORDER — METOPROLOL SUCCINATE 25 MG/1
25 TABLET, EXTENDED RELEASE ORAL
COMMUNITY
Start: 2024-02-23

## 2024-03-18 RX ORDER — OXYBUTYNIN CHLORIDE 10 MG/1
TABLET, EXTENDED RELEASE ORAL
COMMUNITY
Start: 2024-02-12 | End: 2024-03-28 | Stop reason: ALTCHOICE

## 2024-03-18 RX ORDER — IBUPROFEN 600 MG/1
TABLET ORAL
COMMUNITY
Start: 2024-01-25

## 2024-03-18 RX ORDER — FLUTICASONE FUROATE, UMECLIDINIUM BROMIDE AND VILANTEROL TRIFENATATE 100; 62.5; 25 UG/1; UG/1; UG/1
1 POWDER RESPIRATORY (INHALATION)
COMMUNITY
Start: 2024-02-23 | End: 2024-03-28

## 2024-03-18 RX ORDER — TELMISARTAN AND HYDROCHLORTHIAZIDE 80; 25 MG/1; MG/1
TABLET ORAL
COMMUNITY
Start: 2024-03-15

## 2024-03-27 ENCOUNTER — TELEPHONE (OUTPATIENT)
Age: 74
End: 2024-03-27

## 2024-03-28 ENCOUNTER — OFFICE VISIT (OUTPATIENT)
Dept: PULMONOLOGY | Facility: CLINIC | Age: 74
End: 2024-03-28

## 2024-03-28 VITALS
SYSTOLIC BLOOD PRESSURE: 128 MMHG | TEMPERATURE: 97.5 F | OXYGEN SATURATION: 94 % | DIASTOLIC BLOOD PRESSURE: 70 MMHG | HEIGHT: 64 IN | WEIGHT: 202.2 LBS | HEART RATE: 93 BPM | BODY MASS INDEX: 34.52 KG/M2

## 2024-03-28 DIAGNOSIS — R91.8 PULMONARY NODULES: ICD-10-CM

## 2024-03-28 DIAGNOSIS — E66.01 SEVERE OBESITY (HCC): ICD-10-CM

## 2024-03-28 DIAGNOSIS — Z99.89 CPAP (CONTINUOUS POSITIVE AIRWAY PRESSURE) DEPENDENCE: ICD-10-CM

## 2024-03-28 DIAGNOSIS — Z85.51 HISTORY OF BLADDER CANCER: ICD-10-CM

## 2024-03-28 DIAGNOSIS — J44.0 CHRONIC OBSTRUCTIVE PULMONARY DISEASE WITH ACUTE LOWER RESPIRATORY INFECTION (HCC): Primary | ICD-10-CM

## 2024-03-28 DIAGNOSIS — J96.01 ACUTE RESPIRATORY FAILURE WITH HYPOXIA (HCC): ICD-10-CM

## 2024-03-28 DIAGNOSIS — J18.9 COMMUNITY ACQUIRED PNEUMONIA OF LEFT LOWER LOBE OF LUNG: ICD-10-CM

## 2024-03-28 RX ORDER — LEVALBUTEROL TARTRATE 45 UG/1
1-2 AEROSOL, METERED ORAL EVERY 4 HOURS PRN
Qty: 15 G | Refills: 1 | Status: SHIPPED | OUTPATIENT
Start: 2024-03-28

## 2024-03-28 RX ORDER — LEVALBUTEROL INHALATION SOLUTION 0.63 MG/3ML
0.63 SOLUTION RESPIRATORY (INHALATION) EVERY 6 HOURS PRN
Qty: 75 ML | Refills: 1 | Status: SHIPPED | OUTPATIENT
Start: 2024-03-28

## 2024-03-28 NOTE — PATIENT INSTRUCTIONS
Chronic obstructive pulmonary disease with acute lower respiratory infection (HCC)  With pulmonary fibrosis as noted on recent CT chest  Exam is clear today and she is improved from a symptoms standpoint however she has shortness of breath ongoing at her baseline.  Repeat CT chest in 6 months  Trelegy - felt to be ineffective.  Trial of Breztri 2 puffs AM and PM daily  Xopenex (levalbuterol) 2 puffs up to 4x per day if needed (too jittery with albuterol) cough, wheeze, shortness of breath  Ipratropium / Xopenex via nebulizer up to 4x per day if needed - cough, wheeze, shortness of breath    Community acquired pneumonia of left lower lobe of lung  She has completed oral steroids at this point and feels improved from prior.  Repeat CT chest in 6 months    Acute respiratory failure with hypoxia (HCC)  6MWT today: She requires 2L/m supplemental O2 during ambulation; can maintain room air at rest and titrate to maintain saturations > 90%    CPAP (continuous positive airway pressure) dependence  Managed by PCP  Continue CPAP for all hours of sleep each night.

## 2024-03-28 NOTE — PROGRESS NOTES
Pulmonary Follow-Up Note   Moriah Phillips 74 y.o. female MRN: [id number]  3/28/2024      Assessment/Plan:    Diagnoses and all orders for this visit:    Chronic obstructive pulmonary disease with acute lower respiratory infection (HCC)  With pulmonary fibrosis as noted on recent CT chest  Exam is clear today and she is improved from a symptoms standpoint however she has shortness of breath ongoing at her baseline.  Repeat CT chest in 6 months  Trelegy - felt to be ineffective.  Trial of Breztri 2 puffs AM and PM daily  Xopenex (levalbuterol) 2 puffs up to 4x per day if needed (too jittery with albuterol) cough, wheeze, shortness of breath  Ipratropium / Xopenex via nebulizer up to 4x per day if needed - cough, wheeze, shortness of breath    Community acquired pneumonia of left lower lobe of lung  She has completed oral steroids at this point and feels improved from prior.  Repeat CT chest in 6 months    Acute respiratory failure with hypoxia (HCC)  6MWT today: She requires 2L/m supplemental O2 during ambulation; can maintain room air at rest and titrate to maintain saturations > 90%    CPAP (continuous positive airway pressure) dependence  Managed by PCP  Continue CPAP for all hours of sleep each night.    History of bladder cancer  Recent cystoscopy was clear      Vaccines: Recommend she consider annual influenza and COVID19 vaccines    Return in about 3 months (around 6/28/2024).    All of Moriah's questions were answered prior to leaving the office today.  She is aware to call our office with any further questions or concerns.    History of Present Illness   Reason for Visit: HFU  Chief Complaint: short of breath  HPI: Moriah Phillips is a 74 y.o. female who presents to the office today following recent hospitalization 3/1/24-3/6/24 for acute respiratory failure with hypoxia in the setting of CAP. She was discharged on 2-4L/m O2, prednisone taper, Vantin, and Trelegy 200 (prior to hospital did not require  supplemental o2).    She has PMHx COPD, hypertension, hyperlipidemia, bladder cancer, nicotine dependence, ZINA on CPAP.    COPD diagnosed during hospitalization however she reports years prior of shortness of breath with exertion. She denies coughing or wheezing.   In the past she has tried Anoro.  She is not taking nebulizer treatments - DuoNeb is making her jittery for hours at a time.  She is not taking Trelegy - does not feel it helps.    Patient lives in [place], [place] with her   Pets - dog    Work Hx: fabric factory, [employer]  Highest level of education is HS graduate.    Tobacco Use: Started smoking age 24. Smoked PPD or more; quit 3/1/24.  Drug use:denies  Alcohol use: denies    Advance Directives Status:  Devin is her health care proxy; no advance directives.    Functional status: ADL's? Exercise? Mobility concerns? Stairs? Laundry facilities?        Review of Systems   Constitutional:  Negative for chills, fatigue and fever.   HENT:  Negative for congestion and postnasal drip.    Respiratory:  Positive for shortness of breath. Negative for cough, chest tightness and wheezing.    Cardiovascular:  Negative for chest pain, palpitations and leg swelling.   Gastrointestinal:  Negative for abdominal pain.   Allergic/Immunologic: Negative for environmental allergies.   All other systems reviewed and are negative.      Historical Information   Past Medical History:   Diagnosis Date    Acute UTI 2014    Arthritis 2016    Bladder cancer (HCC)     COPD (chronic obstructive pulmonary disease) (HCC) 2016    CPAP (continuous positive airway pressure) dependence     Dependence on nicotine from cigarettes 2016    Depression 2016    Dupuytren's disease of palm 2018    Dyspnea on exertion     GERD (gastroesophageal reflux disease)     Heart murmur     Hyperlipidemia     Hypertension     Hypokalemia 2016    Left ventricular hypertrophy     Mixed stress and urge urinary incontinence     Non-rheumatic mitral  regurgitation 2023    Osteoarthritis 2016    left knee    Osteopenia     Panlobular emphysema (HCC)     Recurrent UTI     RLS (restless legs syndrome) 2016    Sleep apnea     Sleep apnea 2023     Past Surgical History:   Procedure Laterality Date    CATARACT EXTRACTION Bilateral     CYSTOSCOPY      ILEOSTOMY      TX CYSTOURETHROSCOPY N/A 11/17/2023    Procedure: CYSTOSCOPY  bilateral retrograde pyelograms;  Surgeon: Ryan Saab MD;  Location:  MAIN OR;  Service: Urology    REPLACEMENT TOTAL KNEE BILATERAL      TONSILLECTOMY       History reviewed. No pertinent family history.  Social History   Social History     Substance and Sexual Activity   Alcohol Use Yes    Alcohol/week: 2.0 standard drinks of alcohol    Types: 2 Standard drinks or equivalent per week    Comment: Occasionally     Social History     Substance and Sexual Activity   Drug Use Not Currently     Social History     Tobacco Use   Smoking Status Former    Current packs/day: 1.00    Average packs/day: 1 pack/day for 50.0 years (50.0 ttl pk-yrs)    Types: Cigarettes   Smokeless Tobacco Never   Tobacco Comments    Has not smoked since hospital     E-Cigarette/Vaping    E-Cigarette Use Never User      E-Cigarette/Vaping Substances    Nicotine No     THC No     CBD No     Flavoring No     Other No     Unknown No        Meds/Allergies     Current Outpatient Medications:     aspirin (ECOTRIN LOW STRENGTH) 81 mg EC tablet, Take 81 mg by mouth daily, Disp: , Rfl:     ibuprofen (MOTRIN) 600 mg tablet, , Disp: , Rfl:     ipratropium (ATROVENT) 0.02 % nebulizer solution, Take 2.5 mL (0.5 mg total) by nebulization every 6 (six) hours as needed for wheezing or shortness of breath, Disp: 62.5 mL, Rfl: 1    levalbuterol (Xopenex HFA) 45 mcg/act inhaler, Inhale 1-2 puffs every 4 (four) hours as needed for wheezing, Disp: 15 g, Rfl: 1    levalbuterol (Xopenex) 0.63 mg/3 mL nebulizer solution, Take 3 mL (0.63 mg total) by nebulization every 6 (six) hours as needed for  "wheezing or shortness of breath, Disp: 75 mL, Rfl: 1    metoprolol succinate (Toprol XL) 25 mg 24 hr tablet, 25 mg, Disp: , Rfl:     nicotine (NICODERM CQ) 14 mg/24hr TD 24 hr patch, Place 1 patch on the skin over 24 hours daily, Disp: 28 patch, Rfl: 0    pramipexole (MIRAPEX) 0.5 mg tablet, Take 1 tablet by mouth 2 (two) times a day, Disp: , Rfl:     rosuvastatin (CRESTOR) 5 mg tablet, Take 5 mg by mouth daily, Disp: , Rfl:     sertraline (ZOLOFT) 100 mg tablet, Take 100 mg by mouth daily, Disp: , Rfl:     telmisartan-hydrochlorothiazide (MICARDIS HCT) 80-25 MG per tablet, , Disp: , Rfl:     Trelegy Ellipta 200-62.5-25 MCG/ACT AEPB inhaler, Inhale 1 puff daily (Patient not taking: Reported on 3/28/2024), Disp: , Rfl:   Allergies   Allergen Reactions    Penicillins Hives    Morphine GI Intolerance     n, v       Vitals: Blood pressure 128/70, pulse 93, temperature 97.5 °F (36.4 °C), height 5' 4\" (1.626 m), weight 91.7 kg (202 lb 3.2 oz), SpO2 94%. Body mass index is 34.71 kg/m². Oxygen Therapy  SpO2: 94 % (@ 2 liters)      Physical Exam  Vitals reviewed.   Constitutional:       Appearance: Normal appearance. She is normal weight.   HENT:      Head: Normocephalic.      Nose: Nose normal.      Mouth/Throat:      Mouth: Mucous membranes are moist.      Pharynx: Oropharynx is clear.   Eyes:      Conjunctiva/sclera: Conjunctivae normal.      Pupils: Pupils are equal, round, and reactive to light.   Cardiovascular:      Rate and Rhythm: Normal rate and regular rhythm.      Pulses: Normal pulses.      Heart sounds: Normal heart sounds.   Pulmonary:      Effort: Pulmonary effort is normal.      Breath sounds: Normal breath sounds.   Abdominal:      General: Abdomen is flat. Bowel sounds are normal.      Palpations: Abdomen is soft.   Musculoskeletal:         General: Normal range of motion.   Skin:     General: Skin is warm and dry.      Capillary Refill: Capillary refill takes less than 2 seconds.   Neurological:      " "General: No focal deficit present.      Mental Status: She is alert and oriented to person, place, and time. Mental status is at baseline.   Psychiatric:         Mood and Affect: Mood normal.         Behavior: Behavior normal.         Thought Content: Thought content normal.         Judgment: Judgment normal.         Labs:   Lab Results   Component Value Date    WBC 11.61 (H) 03/18/2024    HGB 13.9 03/18/2024    HCT 44.4 03/18/2024    MCV 97 03/18/2024     03/18/2024    EOSPCT 2 03/01/2024    EOSABS 0.14 03/01/2024    MONOPCT 13 (H) 03/01/2024     Lab Results   Component Value Date    CALCIUM 9.1 03/18/2024    K 3.8 03/18/2024    CO2 34 (H) 03/18/2024     03/18/2024    BUN 30 (H) 03/18/2024    CREATININE 1.02 03/18/2024     No results found for: \"IGE\", \"RAST\"  Lab Results   Component Value Date    ALT 33 03/18/2024    AST 21 03/18/2024    ALKPHOS 61 03/18/2024         Imaging and other studies: I have personally reviewed pertinent reports.   and I have personally reviewed pertinent films in PACS  CXR 3/18/24  No acute cardiopulmonary disease.  Findings consistent with upper zone emphysema.  Mild cardiomegaly.     CTA chest PE study 3/3/24  1.  No pulmonary embolism.  2.  Moderate-to-severe emphysema with possible concomitant interstitial lung disease (combined pulmonary fibrosis and emphysema).  3.  Few small opacities in the periphery of the right lower lobe are likely focal mild atelectasis. Pneumonia is to be determined clinical grounds.  4.  Few mildly enlarged mediastinal and hilar lymph nodes may be reactive (due to suspected interstitial lung disease or less likely due to infection). However, given the emphysema, contrast-enhanced chest CT is advised in 6 months to confirm the absence of a neoplastic process    INA 3/5/24    Left Ventricle: Left ventricular cavity size is normal. Wall thickness is normal. The left ventricular ejection fraction is 65% by visual estimation. Systolic function is " "normal. Wall motion is normal. Diastolic function is abnormal.    Left Atrium: The atrium is mildly dilated.    Mitral Valve: MR is present, Cannot assess severity given eccentric jet    Pulmonary Results (PFTs, PSG): none on record    Today's Testing:     Six Minute Walk Test:   Patient required 2L/m supplemental O2 during ambulation.  Remained at room air during rest with no desaturation.      MELONIE Ortiz  [facility]        Portions of the record may have been created with voice recognition software.  Occasional wrong word or \"sound a like\" substitutions may have occurred due to the inherent limitations of voice recognition software.  Read the chart carefully and recognize, using context, where substitutions have occurred or contact the dictating provider.  "

## 2024-03-29 ENCOUNTER — TELEPHONE (OUTPATIENT)
Age: 74
End: 2024-03-29

## 2024-03-29 DIAGNOSIS — J44.0 CHRONIC OBSTRUCTIVE PULMONARY DISEASE WITH ACUTE LOWER RESPIRATORY INFECTION (HCC): ICD-10-CM

## 2024-03-29 NOTE — TELEPHONE ENCOUNTER
Gabo from [facility] pharmacy called in stating that the patient wanted the Levalbuterol inhaler sent through the mail order pharmacy. Please advise.

## 2024-04-01 RX ORDER — LEVALBUTEROL TARTRATE 45 UG/1
1-2 AEROSOL, METERED ORAL EVERY 4 HOURS PRN
Qty: 15 G | Refills: 3 | Status: SHIPPED | OUTPATIENT
Start: 2024-04-01

## 2024-04-30 PROBLEM — J18.9 COMMUNITY ACQUIRED PNEUMONIA OF LEFT LOWER LOBE OF LUNG: Status: RESOLVED | Noted: 2024-03-01 | Resolved: 2024-04-30

## 2024-05-01 PROBLEM — N30.00 ACUTE CYSTITIS: Status: RESOLVED | Noted: 2024-03-03 | Resolved: 2024-05-01

## 2024-05-10 ENCOUNTER — TELEPHONE (OUTPATIENT)
Age: 74
End: 2024-05-10

## 2024-05-10 NOTE — TELEPHONE ENCOUNTER
[facility] asking if oxygen testing was done at last visit/ or can it be done at upcoming visit.    Fax results to : [phone]

## 2024-06-06 ENCOUNTER — TELEPHONE (OUTPATIENT)
Age: 74
End: 2024-06-06

## 2024-06-06 NOTE — TELEPHONE ENCOUNTER
Em from [facility] needs to know what type of PFT testing has to be done.    If it's basic testing which spirometry, diffusion capacity, and lung or if test needs Nitrogen washout, or Methacholine challenge state order doesn't indicate.      Office can notify by calling [phone] and ask for Em

## 2024-06-25 RX ORDER — CELECOXIB 200 MG/1
CAPSULE ORAL
COMMUNITY
Start: 2024-05-15

## 2024-06-25 RX ORDER — OMEPRAZOLE 20 MG/1
CAPSULE, DELAYED RELEASE ORAL
COMMUNITY
Start: 2024-05-31

## 2024-07-02 PROBLEM — F17.211 NICOTINE DEPENDENCE, CIGARETTES, IN REMISSION: Status: ACTIVE | Noted: 2024-07-02

## 2024-07-02 PROBLEM — J96.11 CHRONIC HYPOXIC RESPIRATORY FAILURE (HCC): Status: ACTIVE | Noted: 2024-07-02

## 2024-07-02 PROBLEM — J96.01 ACUTE RESPIRATORY FAILURE WITH HYPOXIA (HCC): Status: RESOLVED | Noted: 2024-03-01 | Resolved: 2024-07-02

## 2024-07-02 PROBLEM — R93.89 ABNORMAL CT OF THE CHEST: Status: ACTIVE | Noted: 2024-07-02

## 2024-07-03 ENCOUNTER — TELEPHONE (OUTPATIENT)
Age: 74
End: 2024-07-03

## 2024-07-03 NOTE — TELEPHONE ENCOUNTER
Suleman from [facility] called asking if pt went to her last appt / I told him no she was a no show. / and has no future appts with [facility] coming up.    He said that he will call pt to make an appt becuz she requires recertification for her O2 equipment .    Thank you

## 2024-08-15 ENCOUNTER — HOSPITAL ENCOUNTER (EMERGENCY)
Facility: HOSPITAL | Age: 74
Discharge: HOME | End: 2024-08-15
Attending: EMERGENCY MEDICINE
Payer: MEDICARE

## 2024-08-15 ENCOUNTER — APPOINTMENT (EMERGENCY)
Dept: CT IMAGING | Facility: HOSPITAL | Age: 74
End: 2024-08-15
Payer: MEDICARE

## 2024-08-15 VITALS
WEIGHT: 206.35 LBS | HEART RATE: 75 BPM | TEMPERATURE: 97.9 F | RESPIRATION RATE: 18 BRPM | DIASTOLIC BLOOD PRESSURE: 53 MMHG | OXYGEN SATURATION: 93 % | SYSTOLIC BLOOD PRESSURE: 101 MMHG | BODY MASS INDEX: 35.42 KG/M2

## 2024-08-15 DIAGNOSIS — N39.0 UTI (URINARY TRACT INFECTION): Primary | ICD-10-CM

## 2024-08-15 LAB
ALBUMIN SERPL BCG-MCNC: 4.3 G/DL (ref 3.5–5)
ALP SERPL-CCNC: 59 U/L (ref 34–104)
ALT SERPL W P-5'-P-CCNC: 13 U/L (ref 7–52)
ANION GAP SERPL CALCULATED.3IONS-SCNC: 9 MMOL/L (ref 4–13)
AST SERPL W P-5'-P-CCNC: 14 U/L (ref 13–39)
BACTERIA UR QL AUTO: ABNORMAL /HPF
BASOPHILS # BLD AUTO: 0.06 THOUSANDS/ÂΜL (ref 0–0.1)
BASOPHILS NFR BLD AUTO: 1 % (ref 0–1)
BILIRUB SERPL-MCNC: 0.57 MG/DL (ref 0.2–1)
BILIRUB UR QL STRIP: ABNORMAL
BUN SERPL-MCNC: 20 MG/DL (ref 5–25)
CALCIUM SERPL-MCNC: 9.7 MG/DL (ref 8.4–10.2)
CHLORIDE SERPL-SCNC: 104 MMOL/L (ref 96–108)
CLARITY UR: ABNORMAL
CO2 SERPL-SCNC: 27 MMOL/L (ref 21–32)
COLOR UR: YELLOW
CREAT SERPL-MCNC: 0.97 MG/DL (ref 0.6–1.3)
EOSINOPHIL # BLD AUTO: 0.12 THOUSAND/ÂΜL (ref 0–0.61)
EOSINOPHIL NFR BLD AUTO: 1 % (ref 0–6)
ERYTHROCYTE [DISTWIDTH] IN BLOOD BY AUTOMATED COUNT: 12.7 % (ref 11.6–15.1)
GFR SERPL CREATININE-BSD FRML MDRD: 58 ML/MIN/1.73SQ M
GLUCOSE SERPL-MCNC: 108 MG/DL (ref 65–140)
GLUCOSE UR STRIP-MCNC: NEGATIVE MG/DL
HCT VFR BLD AUTO: 42.5 % (ref 34.8–46.1)
HGB BLD-MCNC: 13.9 G/DL (ref 11.5–15.4)
HGB UR QL STRIP.AUTO: ABNORMAL
IMM GRANULOCYTES # BLD AUTO: 0.02 THOUSAND/UL (ref 0–0.2)
IMM GRANULOCYTES NFR BLD AUTO: 0 % (ref 0–2)
KETONES UR STRIP-MCNC: NEGATIVE MG/DL
LACTATE SERPL-SCNC: 1.2 MMOL/L (ref 0.5–2)
LEUKOCYTE ESTERASE UR QL STRIP: ABNORMAL
LYMPHOCYTES # BLD AUTO: 2.03 THOUSANDS/ÂΜL (ref 0.6–4.47)
LYMPHOCYTES NFR BLD AUTO: 21 % (ref 14–44)
MCH RBC QN AUTO: 31.1 PG (ref 26.8–34.3)
MCHC RBC AUTO-ENTMCNC: 32.7 G/DL (ref 31.4–37.4)
MCV RBC AUTO: 95 FL (ref 82–98)
MONOCYTES # BLD AUTO: 0.63 THOUSAND/ÂΜL (ref 0.17–1.22)
MONOCYTES NFR BLD AUTO: 7 % (ref 4–12)
NEUTROPHILS # BLD AUTO: 6.63 THOUSANDS/ÂΜL (ref 1.85–7.62)
NEUTS SEG NFR BLD AUTO: 70 % (ref 43–75)
NITRITE UR QL STRIP: NEGATIVE
NON-SQ EPI CELLS URNS QL MICRO: ABNORMAL /HPF
NRBC BLD AUTO-RTO: 0 /100 WBCS
PH UR STRIP.AUTO: 6.5 [PH]
PLATELET # BLD AUTO: 178 THOUSANDS/UL (ref 149–390)
PMV BLD AUTO: 8.9 FL (ref 8.9–12.7)
POTASSIUM SERPL-SCNC: 3.8 MMOL/L (ref 3.5–5.3)
PROT SERPL-MCNC: 7.1 G/DL (ref 6.4–8.4)
PROT UR STRIP-MCNC: ABNORMAL MG/DL
RBC # BLD AUTO: 4.47 MILLION/UL (ref 3.81–5.12)
RBC #/AREA URNS AUTO: ABNORMAL /HPF
SODIUM SERPL-SCNC: 140 MMOL/L (ref 135–147)
SP GR UR STRIP.AUTO: 1.02 (ref 1–1.03)
UROBILINOGEN UR QL STRIP.AUTO: 0.2 E.U./DL
WBC # BLD AUTO: 9.49 THOUSAND/UL (ref 4.31–10.16)
WBC #/AREA URNS AUTO: ABNORMAL /HPF

## 2024-08-15 PROCEDURE — 99284 EMERGENCY DEPT VISIT MOD MDM: CPT

## 2024-08-15 PROCEDURE — 85025 COMPLETE CBC W/AUTO DIFF WBC: CPT | Performed by: EMERGENCY MEDICINE

## 2024-08-15 PROCEDURE — 87186 SC STD MICRODIL/AGAR DIL: CPT | Performed by: EMERGENCY MEDICINE

## 2024-08-15 PROCEDURE — 74176 CT ABD & PELVIS W/O CONTRAST: CPT

## 2024-08-15 PROCEDURE — 96365 THER/PROPH/DIAG IV INF INIT: CPT

## 2024-08-15 PROCEDURE — 80053 COMPREHEN METABOLIC PANEL: CPT | Performed by: EMERGENCY MEDICINE

## 2024-08-15 PROCEDURE — 99284 EMERGENCY DEPT VISIT MOD MDM: CPT | Performed by: EMERGENCY MEDICINE

## 2024-08-15 PROCEDURE — 36415 COLL VENOUS BLD VENIPUNCTURE: CPT | Performed by: EMERGENCY MEDICINE

## 2024-08-15 PROCEDURE — 87086 URINE CULTURE/COLONY COUNT: CPT | Performed by: EMERGENCY MEDICINE

## 2024-08-15 PROCEDURE — 81001 URINALYSIS AUTO W/SCOPE: CPT | Performed by: EMERGENCY MEDICINE

## 2024-08-15 PROCEDURE — 83605 ASSAY OF LACTIC ACID: CPT | Performed by: EMERGENCY MEDICINE

## 2024-08-15 PROCEDURE — 51798 US URINE CAPACITY MEASURE: CPT

## 2024-08-15 PROCEDURE — 87077 CULTURE AEROBIC IDENTIFY: CPT | Performed by: EMERGENCY MEDICINE

## 2024-08-15 RX ORDER — PHENAZOPYRIDINE HYDROCHLORIDE 100 MG/1
200 TABLET, FILM COATED ORAL ONCE
Status: COMPLETED | OUTPATIENT
Start: 2024-08-15 | End: 2024-08-15

## 2024-08-15 RX ORDER — CEPHALEXIN 500 MG/1
500 CAPSULE ORAL EVERY 6 HOURS SCHEDULED
Qty: 40 CAPSULE | Refills: 0 | Status: SHIPPED | OUTPATIENT
Start: 2024-08-15 | End: 2024-08-25

## 2024-08-15 RX ORDER — PHENAZOPYRIDINE HYDROCHLORIDE 200 MG/1
200 TABLET, FILM COATED ORAL 3 TIMES DAILY
Qty: 6 TABLET | Refills: 0 | Status: SHIPPED | OUTPATIENT
Start: 2024-08-15

## 2024-08-15 RX ORDER — CEFTRIAXONE 1 G/50ML
1000 INJECTION, SOLUTION INTRAVENOUS ONCE
Status: COMPLETED | OUTPATIENT
Start: 2024-08-15 | End: 2024-08-15

## 2024-08-15 RX ADMIN — CEFTRIAXONE 1000 MG: 1 INJECTION, SOLUTION INTRAVENOUS at 13:26

## 2024-08-15 RX ADMIN — PHENAZOPYRIDINE 200 MG: 100 TABLET ORAL at 13:26

## 2024-08-15 NOTE — ED PROVIDER NOTES
History  Chief Complaint   Patient presents with    Possible UTI     Pt states frequent utis. Dysuria and back pain present. Sent by PCP.      Patient with a history of bladder cancer treated with surgery and chemotherapy.  Under no treatment currently.  Has been having recurrent UTIs for the past 3 years.  Complains of pressure.  No fevers or chills.  Complains of lower abdominal pain and back pain.  No nausea or vomiting or diarrhea.  Seen by PCP and sent here for further evaluation.      History provided by:  Patient   used: No    Difficulty Urinating  Pain quality:  Burning  Pain severity:  Mild  Onset quality:  Gradual  Timing:  Constant  Progression:  Unchanged  Chronicity:  Recurrent  Relieved by:  Nothing  Worsened by:  Nothing  Ineffective treatments:  None tried  Urinary symptoms: frequent urination    Associated symptoms: abdominal pain and flank pain    Associated symptoms: no fever, no nausea and no vomiting        Prior to Admission Medications   Prescriptions Last Dose Informant Patient Reported? Taking?   Budeson-Glycopyrrol-Formoterol (Breztri Aerosphere) 160-9-4.8 MCG/ACT AERO   Yes No   Sig: Inhale 2 Inhalations 2 (two) times a day   Estradiol (Imvexxy Maintenance Pack) 10 MCG INST   Yes No   Trelegy Ellipta 200-62.5-25 MCG/ACT AEPB inhaler   Yes No   Sig: Inhale 1 puff daily   Patient not taking: Reported on 3/28/2024   aspirin (ECOTRIN LOW STRENGTH) 81 mg EC tablet   Yes No   Sig: Take 81 mg by mouth daily   celecoxib (CeleBREX) 200 mg capsule   Yes No   ibuprofen (MOTRIN) 600 mg tablet   Yes No   ipratropium (ATROVENT) 0.02 % nebulizer solution   No No   Sig: Take 2.5 mL (0.5 mg total) by nebulization every 6 (six) hours as needed for wheezing or shortness of breath   levalbuterol (Xopenex HFA) 45 mcg/act inhaler   No No   Sig: Inhale 1-2 puffs every 4 (four) hours as needed for wheezing   levalbuterol (Xopenex) 0.63 mg/3 mL nebulizer solution   No No   Sig: Take 3 mL (0.63  mg total) by nebulization every 6 (six) hours as needed for wheezing or shortness of breath   methenamine hippurate (HIPREX) 1 g tablet   Yes No   metoprolol succinate (Toprol XL) 25 mg 24 hr tablet   Yes No   Si mg   nicotine (NICODERM CQ) 14 mg/24hr TD 24 hr patch   No No   Sig: Place 1 patch on the skin over 24 hours daily   omeprazole (PriLOSEC) 20 mg delayed release capsule   Yes No   pramipexole (MIRAPEX) 0.5 mg tablet   Yes No   Sig: Take 1 tablet by mouth 2 (two) times a day   rosuvastatin (CRESTOR) 5 mg tablet   Yes No   Sig: Take 5 mg by mouth daily   sertraline (ZOLOFT) 100 mg tablet   Yes No   Sig: Take 100 mg by mouth daily   telmisartan-hydrochlorothiazide (MICARDIS HCT) 80-25 MG per tablet   Yes No      Facility-Administered Medications: None       Past Medical History:   Diagnosis Date    Acute UTI     Arthritis 2016    Bladder cancer (HCC)     COPD (chronic obstructive pulmonary disease) (HCC) 2016    CPAP (continuous positive airway pressure) dependence     Dependence on nicotine from cigarettes 2016    Depression 2016    Dupuytren's disease of palm 2018    Dyspnea on exertion     GERD (gastroesophageal reflux disease)     Heart murmur     Hyperlipidemia     Hypertension     Hypokalemia 2016    Left ventricular hypertrophy     Mixed stress and urge urinary incontinence     Non-rheumatic mitral regurgitation     Osteoarthritis 2016    left knee    Osteopenia     Panlobular emphysema (HCC)     Recurrent UTI     RLS (restless legs syndrome) 2016    Sleep apnea     Sleep apnea        Past Surgical History:   Procedure Laterality Date    CATARACT EXTRACTION Bilateral     CYSTOSCOPY      ILEOSTOMY      SD CYSTOURETHROSCOPY N/A 2023    Procedure: CYSTOSCOPY  bilateral retrograde pyelograms;  Surgeon: Ryan Saab MD;  Location: [facility] OR;  Service: Urology    REPLACEMENT TOTAL KNEE BILATERAL      TONSILLECTOMY         History reviewed. No pertinent family history.  I have reviewed  and agree with the history as documented.    E-Cigarette/Vaping    E-Cigarette Use Never User      E-Cigarette/Vaping Substances    Nicotine No     THC No     CBD No     Flavoring No     Other No     Unknown No      Social History     Tobacco Use    Smoking status: Former     Current packs/day: 1.00     Average packs/day: 1 pack/day for 50.0 years (50.0 ttl pk-yrs)     Types: Cigarettes    Smokeless tobacco: Never    Tobacco comments:     Has not smoked since hospital   Vaping Use    Vaping status: Never Used   Substance Use Topics    Alcohol use: Yes     Alcohol/week: 2.0 standard drinks of alcohol     Types: 2 Standard drinks or equivalent per week     Comment: Occasionally    Drug use: Not Currently       Review of Systems   Constitutional:  Negative for chills and fever.   HENT:  Negative for ear pain, hearing loss, sore throat, trouble swallowing and voice change.    Eyes:  Negative for pain and discharge.   Respiratory:  Negative for cough, shortness of breath and wheezing.    Cardiovascular:  Negative for chest pain and palpitations.   Gastrointestinal:  Positive for abdominal pain. Negative for blood in stool, constipation, diarrhea, nausea and vomiting.   Genitourinary:  Positive for dysuria and flank pain. Negative for frequency and hematuria.   Musculoskeletal:  Negative for joint swelling, neck pain and neck stiffness.   Skin:  Negative for rash and wound.   Neurological:  Negative for dizziness, seizures, syncope, facial asymmetry and headaches.   Psychiatric/Behavioral:  Negative for hallucinations, self-injury and suicidal ideas.    All other systems reviewed and are negative.      Physical Exam  Physical Exam  Vitals and nursing note reviewed.   Constitutional:       General: She is not in acute distress.     Appearance: She is well-developed.   HENT:      Head: Normocephalic and atraumatic.      Right Ear: External ear normal.      Left Ear: External ear normal.   Eyes:      General: No scleral  icterus.        Right eye: No discharge.         Left eye: No discharge.      Extraocular Movements: Extraocular movements intact.      Conjunctiva/sclera: Conjunctivae normal.   Cardiovascular:      Rate and Rhythm: Normal rate and regular rhythm.      Heart sounds: Normal heart sounds. No murmur heard.  Pulmonary:      Effort: Pulmonary effort is normal.      Breath sounds: Normal breath sounds. No wheezing or rales.   Abdominal:      General: Bowel sounds are normal. There is no distension.      Palpations: Abdomen is soft.      Tenderness: There is abdominal tenderness. There is no guarding or rebound.      Comments: Minimally tender suprapubic region.  No guarding or rebound.   Musculoskeletal:         General: No deformity. Normal range of motion.      Cervical back: Normal range of motion and neck supple.   Skin:     General: Skin is warm and dry.      Findings: No rash.   Neurological:      General: No focal deficit present.      Mental Status: She is alert and oriented to person, place, and time.      Cranial Nerves: No cranial nerve deficit.   Psychiatric:         Mood and Affect: Mood normal.         Behavior: Behavior normal.         Thought Content: Thought content normal.         Judgment: Judgment normal.         Vital Signs  ED Triage Vitals [08/15/24 1236]   Temperature Pulse Respirations Blood Pressure SpO2   97.9 °F (36.6 °C) 93 18 151/80 93 %      Temp Source Heart Rate Source Patient Position - Orthostatic VS BP Location FiO2 (%)   Temporal Monitor Sitting Right arm --      Pain Score       --           Vitals:    08/15/24 1236   BP: 151/80   Pulse: 93   Patient Position - Orthostatic VS: Sitting         Visual Acuity      ED Medications  Medications   cefTRIAXone (ROCEPHIN) IVPB (premix in dextrose) 1,000 mg 50 mL (1,000 mg Intravenous New Bag 8/15/24 1326)   phenazopyridine (PYRIDIUM) tablet 200 mg (200 mg Oral Given 8/15/24 1326)       Diagnostic Studies  Results Reviewed       Procedure  Component Value Units Date/Time    Lactic acid, plasma (w/reflex if result > 2.0) [[id number]]  (Normal) Collected: 08/15/24 1255    Lab Status: Final result Specimen: Blood from Arm, Left Updated: 08/15/24 1320     LACTIC ACID 1.2 mmol/L     Narrative:      Result may be elevated if tourniquet was used during collection.    Comprehensive metabolic panel [[id number]] Collected: 08/15/24 1255    Lab Status: Final result Specimen: Blood from Arm, Left Updated: 08/15/24 1320     Sodium 140 mmol/L      Potassium 3.8 mmol/L      Chloride 104 mmol/L      CO2 27 mmol/L      ANION GAP 9 mmol/L      BUN 20 mg/dL      Creatinine 0.97 mg/dL      Glucose 108 mg/dL      Calcium 9.7 mg/dL      AST 14 U/L      ALT 13 U/L      Alkaline Phosphatase 59 U/L      Total Protein 7.1 g/dL      Albumin 4.3 g/dL      Total Bilirubin 0.57 mg/dL      eGFR 58 ml/min/1.73sq m     Narrative:      National Kid[employer] Foundation guidelines for Chronic Kidney Disease (CKD):     Stage 1 with normal or high GFR (GFR > 90 mL/min/1.73 square meters)    Stage 2 Mild CKD (GFR = 60-89 mL/min/1.73 square meters)    Stage 3A Moderate CKD (GFR = 45-59 mL/min/1.73 square meters)    Stage 3B Moderate CKD (GFR = 30-44 mL/min/1.73 square meters)    Stage 4 Severe CKD (GFR = 15-29 mL/min/1.73 square meters)    Stage 5 End Stage CKD (GFR <15 mL/min/1.73 square meters)  Note: GFR calculation is accurate only with a steady state creatinine    Urine Microscopic [[id number]]  (Abnormal) Collected: 08/15/24 1245    Lab Status: Final result Specimen: Urine, Clean Catch Updated: 08/15/24 1311     RBC, UA 2-4 /hpf      WBC, UA Innumerable /hpf      Epithelial Cells Moderate /hpf      Bacteria, UA Occasional /hpf     Urine culture [[id number]] Collected: 08/15/24 1245    Lab Status: In process Specimen: Urine, Clean Catch Updated: 08/15/24 1311    CBC and differential [[id number]] Collected: 08/15/24 1255    Lab Status: Final result Specimen: Blood from Arm, Left Updated:  08/15/24 1302     WBC 9.49 Thousand/uL      RBC 4.47 Million/uL      Hemoglobin 13.9 g/dL      Hematocrit 42.5 %      MCV 95 fL      MCH 31.1 pg      MCHC 32.7 g/dL      RDW 12.7 %      MPV 8.9 fL      Platelets 178 Thousands/uL      nRBC 0 /100 WBCs      Segmented % 70 %      Immature Grans % 0 %      Lymphocytes % 21 %      Monocytes % 7 %      Eosinophils Relative 1 %      Basophils Relative 1 %      Absolute Neutrophils 6.63 Thousands/µL      Absolute Immature Grans 0.02 Thousand/uL      Absolute Lymphocytes 2.03 Thousands/µL      Absolute Monocytes 0.63 Thousand/µL      Eosinophils Absolute 0.12 Thousand/µL      Basophils Absolute 0.06 Thousands/µL     UA w Reflex to Microscopic w Reflex to Culture [[id number]]  (Abnormal) Collected: 08/15/24 1245    Lab Status: Final result Specimen: Urine, Clean Catch Updated: 08/15/24 1253     Color, UA Yellow     Clarity, UA Slightly Cloudy     Specific Gravity, UA 1.020     pH, UA 6.5     Leukocytes, UA Moderate     Nitrite, UA Negative     Protein, UA 30 (1+) mg/dl      Glucose, UA Negative mg/dl      Ketones, UA Negative mg/dl      Urobilinogen, UA 0.2 E.U./dl      Bilirubin, UA Small     Occult Blood, UA Small                   CT abdomen pelvis wo contrast   Final Result by Armando Landon MD (08/15 1350)      1.  Findings compatible with cystitis. No hydroureteronephrosis or nephroureterolithiasis.   2.  Redemonstrated subpleural fibrosis within the bilateral lung bases, better assessed on prior chest CT.   3.  Previously described thickened endometrium is not well evaluated by unenhanced CT. Recommend continued management per patient's established gynecologist      The study was marked in EPIC for immediate notification.      Workstation performed: [id number]                    Procedures  Procedures         ED Course  ED Course as of 08/15/24 1358   Thu Aug 15, 2024   1259 Bladder scan postvoid residual was 134.   1314 Discussed with urology on-call about postvoid  residual.  Nothing to do for now.  Treat with antibiotics that are appropriate.  Will need follow-up with urologist for postvoid residual.   1322 Discussed with patient about urology recommendations.                                 SBIRT 22yo+      Flowsheet Row Most Recent Value   Initial Alcohol Screen: US AUDIT-C     1. How often do you have a drink containing alcohol? 0 Filed at: 08/15/2024 1237   2. How many drinks containing alcohol do you have on a typical day you are drinking?  0 Filed at: 08/15/2024 1237   3b. FEMALE Any Age, or MALE 65+: How often do you have 4 or more drinks on one occassion? 0 Filed at: 08/15/2024 1237   Audit-C Score 0 Filed at: 08/15/2024 1237   COURTNEY: How many times in the past year have you...    Used an illegal drug or used a prescription medication for non-medical reasons? Never Filed at: 08/15/2024 1237                      Medical Decision Making  Amount and/or Complexity of Data Reviewed  Labs: ordered.  Radiology: ordered.    Risk  Prescription drug management.                 Disposition  Final diagnoses:   UTI (urinary tract infection)     Time reflects when diagnosis was documented in both MDM as applicable and the Disposition within this note       Time User Action Codes Description Comment    8/15/2024  1:23 PM Albert Posada Add [N39.0] UTI (urinary tract infection)           ED Disposition       ED Disposition   Discharge    Condition   Stable    Date/Time   Thu Aug 15, 2024 1323    Comment   Moriah Phillips discharge to home/self care.                   Follow-up Information       Follow up With Specialties Details Why Contact Info    Devin Singh DO Internal Medicine Call in 1 day  [address] [facility] [address] [phone]      Ryan Saab MD Urology Call in 1 week  [address] [facility] [address] [phone]              Patient's Medications   Discharge Prescriptions    CEPHALEXIN (KEFLEX) 500 MG CAPSULE    Take 1 capsule (500 mg  total) by mouth every 6 (six) hours for 10 days       Start Date: 8/15/2024 End Date: 8/25/2024       Order Dose: 500 mg       Quantity: 40 capsule    Refills: 0    PHENAZOPYRIDINE (PYRIDIUM) 200 MG TABLET    Take 1 tablet (200 mg total) by mouth 3 (three) times a day       Start Date: 8/15/2024 End Date: --       Order Dose: 200 mg       Quantity: 6 tablet    Refills: 0       No discharge procedures on file.    PDMP Review         Value Time User    PDMP Reviewed  Yes 3/1/2024  4:08 PM Kalen Henderson MD            ED Provider  Electronically Signed by             Albert Posada MD  08/15/24 7627

## 2024-08-17 LAB — BACTERIA UR CULT: ABNORMAL

## 2024-10-02 LAB

## 2024-10-09 ENCOUNTER — TELEPHONE (OUTPATIENT)
Dept: PULMONOLOGY | Facility: CLINIC | Age: 74
End: 2024-10-09

## 2024-10-09 RX ORDER — CEFPODOXIME PROXETIL 200 MG/1
200 TABLET, FILM COATED ORAL 2 TIMES DAILY
COMMUNITY
Start: 2024-10-04 | End: 2024-10-10

## 2024-10-09 NOTE — TELEPHONE ENCOUNTER
Called and left message on machine for pt to contact office as she is scheduled for tomorrow for apt but never  complete CT or PFT. Will need to have done prior to visit.

## 2024-10-10 ENCOUNTER — APPOINTMENT (OUTPATIENT)
Dept: LAB | Facility: HOSPITAL | Age: 74
End: 2024-10-10
Payer: MEDICARE

## 2024-10-10 ENCOUNTER — OFFICE VISIT (OUTPATIENT)
Dept: PULMONOLOGY | Facility: CLINIC | Age: 74
End: 2024-10-10

## 2024-10-10 VITALS
OXYGEN SATURATION: 96 % | BODY MASS INDEX: 34.76 KG/M2 | WEIGHT: 203.6 LBS | RESPIRATION RATE: 20 BRPM | HEIGHT: 64 IN | SYSTOLIC BLOOD PRESSURE: 150 MMHG | HEART RATE: 71 BPM | DIASTOLIC BLOOD PRESSURE: 80 MMHG | TEMPERATURE: 97.3 F

## 2024-10-10 DIAGNOSIS — J84.9 ILD (INTERSTITIAL LUNG DISEASE) (HCC): ICD-10-CM

## 2024-10-10 DIAGNOSIS — J44.9 CHRONIC OBSTRUCTIVE PULMONARY DISEASE, UNSPECIFIED COPD TYPE (HCC): Primary | ICD-10-CM

## 2024-10-10 DIAGNOSIS — F17.211 NICOTINE DEPENDENCE, CIGARETTES, IN REMISSION: ICD-10-CM

## 2024-10-10 DIAGNOSIS — Z99.89 CPAP (CONTINUOUS POSITIVE AIRWAY PRESSURE) DEPENDENCE: ICD-10-CM

## 2024-10-10 DIAGNOSIS — J96.11 CHRONIC HYPOXIC RESPIRATORY FAILURE (HCC): ICD-10-CM

## 2024-10-10 DIAGNOSIS — J44.0 CHRONIC OBSTRUCTIVE PULMONARY DISEASE WITH ACUTE LOWER RESPIRATORY INFECTION (HCC): ICD-10-CM

## 2024-10-10 DIAGNOSIS — R93.89 ABNORMAL CT OF THE CHEST: ICD-10-CM

## 2024-10-10 DIAGNOSIS — M19.90 ARTHRITIS: ICD-10-CM

## 2024-10-10 LAB — ERYTHROCYTE [SEDIMENTATION RATE] IN BLOOD: 18 MM/HOUR (ref 0–29)

## 2024-10-10 PROCEDURE — 86430 RHEUMATOID FACTOR TEST QUAL: CPT

## 2024-10-10 PROCEDURE — 85652 RBC SED RATE AUTOMATED: CPT

## 2024-10-10 PROCEDURE — 83520 IMMUNOASSAY QUANT NOS NONAB: CPT

## 2024-10-10 PROCEDURE — 86037 ANCA TITER EACH ANTIBODY: CPT

## 2024-10-10 PROCEDURE — 86602 ANTINOMYCES ANTIBODY: CPT

## 2024-10-10 PROCEDURE — 86671 FUNGUS NES ANTIBODY: CPT

## 2024-10-10 PROCEDURE — 86606 ASPERGILLUS ANTIBODY: CPT

## 2024-10-10 PROCEDURE — 36415 COLL VENOUS BLD VENIPUNCTURE: CPT

## 2024-10-10 PROCEDURE — 86235 NUCLEAR ANTIGEN ANTIBODY: CPT

## 2024-10-10 PROCEDURE — 86331 IMMUNODIFFUSION OUCHTERLONY: CPT

## 2024-10-10 RX ORDER — CELECOXIB 200 MG/1
200 CAPSULE ORAL DAILY
Qty: 90 CAPSULE | Refills: 3 | Status: SHIPPED | OUTPATIENT
Start: 2024-10-10

## 2024-10-10 RX ORDER — LEVALBUTEROL TARTRATE 45 UG/1
1-2 AEROSOL, METERED ORAL EVERY 4 HOURS PRN
Qty: 15 G | Refills: 3 | Status: SHIPPED | OUTPATIENT
Start: 2024-10-10

## 2024-10-10 RX ORDER — FLUTICASONE FUROATE, UMECLIDINIUM BROMIDE AND VILANTEROL TRIFENATATE 200; 62.5; 25 UG/1; UG/1; UG/1
1 POWDER RESPIRATORY (INHALATION) DAILY
Qty: 180 BLISTER | Refills: 3 | Status: SHIPPED | OUTPATIENT
Start: 2024-10-10

## 2024-10-10 NOTE — ASSESSMENT & PLAN NOTE
"March 2024 CT demonstrated  \"few mildly enlarged mediastinal and hilar lymph nodes may be reactive (due to suspected interstitial lung disease or less likely due to infection). However, given the emphysema, contrast-enhanced chest CT is advised in 6 months to confirm the absence of a neoplastic process\"  "

## 2024-10-10 NOTE — ASSESSMENT & PLAN NOTE
In the setting of COPD / ILD. She has not been wearing O2 and does report leg fatigue, shortness of breath with exertion.    6MWT at last visit demonstrated need for supplemental O2 at 1-2L/m with ambulation. Repeated test today continues to demonstrate need for supplemental O2.    I have reviewed the risks of hypoxia in detail today with Moriah and urged her to reconsider wearing O2 with ambulation, 1-2L/m as indicated on original 6MWT.  No need for O2 at rest  Titrate to maintain saturations >90%; check periodic oximetry readings

## 2024-10-10 NOTE — ASSESSMENT & PLAN NOTE
Current PPD smoker; pre contemplation and declines smoking cessation  She has a 50+ pack year history  She is due for repeat CT chest and I have asked her to schedule

## 2024-10-10 NOTE — ASSESSMENT & PLAN NOTE
Not in exacerbation presently. She has had PFT demonstrating mild obstruction with FEV1 70%; there was significant bronchodilator response. There may be a component of asthma. Diffusing capacity is moderately reduced at 54%. Prior imaging demonstrates subpleural reticulation at bilateral lung bases with superimposed emphysema.  Trelegy to continue daily  Levalbuterol HFA up to 4x per day if needed  Levalbuterol/ipratropium via nebulizer up to 4x per day

## 2024-10-10 NOTE — ASSESSMENT & PLAN NOTE
No clear cause of ILD at this time. She has no prior history of known connective tissue disorder. Does endorse osteoarthritis.  Will obtain labs however pattern at this point consistent with CPFE  She has no prior history of skin changes, joint swelling or hypermobility, substantial fatigue or Raynaud's.

## 2024-10-10 NOTE — PROGRESS NOTES
Pulmonary Follow-Up Note   Moriah Phillips 74 y.o. female MRN: [id number]  10/10/2024      Assessment/Plan:    Problem List Items Addressed This Visit       CPAP (continuous positive airway pressure) dependence     Due to ZINA  Compliance reviewed today 9/9/24-10/8/24, [employer]:  She has used 29/30 days (96%)  Average nightly use 10h 11m (96% > 4h)  Residual AHI 2.6    Continue CPAP for all nights of sleep         Arthritis    Relevant Medications    celecoxib (CeleBREX) 200 mg capsule    COPD (chronic obstructive pulmonary disease) (HCC) - Primary     Not in exacerbation presently. She has had PFT demonstrating mild obstruction with FEV1 70%; there was significant bronchodilator response. There may be a component of asthma. Diffusing capacity is moderately reduced at 54%. Prior imaging demonstrates subpleural reticulation at bilateral lung bases with superimposed emphysema.  Trelegy to continue daily  Levalbuterol HFA up to 4x per day if needed  Levalbuterol/ipratropium via nebulizer up to 4x per day         Relevant Medications    levalbuterol (Xopenex HFA) 45 mcg/act inhaler    Trelegy Ellipta 200-62.5-25 MCG/ACT AEPB inhaler    Nicotine dependence, cigarettes, in remission     Current PPD smoker; pre contemplation and declines smoking cessation  She has a 50+ pack year history  She is due for repeat CT chest and I have asked her to schedule         Chronic hypoxic respiratory failure (HCC)     In the setting of COPD / ILD. She has not been wearing O2 and does report leg fatigue, shortness of breath with exertion.    6MWT at last visit demonstrated need for supplemental O2 at 1-2L/m with ambulation. Repeated test today continues to demonstrate need for supplemental O2.    I have reviewed the risks of hypoxia in detail today with Moriah and urged her to reconsider wearing O2 with ambulation, 1-2L/m as indicated on original 6MWT.  No need for O2 at rest  Titrate to maintain saturations >90%; check periodic  "oximetry readings         Relevant Orders    POCT Oxygen Titration (Completed)    Pulse oximetry overnight    Abnormal CT of the chest     March 2024 CT demonstrated  \"few mildly enlarged mediastinal and hilar lymph nodes may be reactive (due to suspected interstitial lung disease or less likely due to infection). However, given the emphysema, contrast-enhanced chest CT is advised in 6 months to confirm the absence of a neoplastic process\"         ILD (interstitial lung disease) (HCC)     No clear cause of ILD at this time. She has no prior history of known connective tissue disorder. Does endorse osteoarthritis.  Will obtain labs however pattern at this point consistent with CPFE  She has no prior history of skin changes, joint swelling or hypermobility, substantial fatigue or Raynaud's.           Relevant Medications    levalbuterol (Xopenex HFA) 45 mcg/act inhaler    Trelegy Ellipta 200-62.5-25 MCG/ACT AEPB inhaler    Other Relevant Orders    Anti-neutrophilic cytoplasmic antibody    Anti-scleroderma antibody    Hypersensitivity Pneumonitis Profile    RF Screen w/ Reflex to Titer    Sedimentation rate, automated    Sjogren's Antibodies     Vaccines: recommend annual flu shot    Return in about 6 months (around 4/10/2025).    All of Moriah's questions were answered prior to leaving the office today.  She is aware to call our office with any further questions or concerns.    History of Present Illness   Reason for Visit: Follow up  Chief Complaint: SOB  HPI: Moriah Phillips is a 74 y.o. female who presents to the office today following up and to review recent results.    She continues to smoke PPD, no intention of quitting.  Feels her dyspnea is improved but does have coughing which she feels is due to ongoing smoking as well as a head cold - grandkids and great grands have been sick with head colds and she has had increased sneezing. She has no mucus production. Does not feel sick, denies fever/chills/fatigue. " "She is not wearing O2 during the day - cannot tolerate \"hauling the equipment around\" and would like to send it back. She does wear O2 2L/m bled into CPAP at night.      Review of Systems  Please note that a 14-point review of systems was performed to include Constitutional, HEENT, Respiratory, CVS, GI, , Musculoskeletal, Integumentary, Neurologic, Rheumatologic, Endocrinologic, Psychiatric, Lymphatic, and Hematologic/Oncologic systems were reviewed and are negative unless otherwise stated in HPI. Positive and negative findings pertinent to this evaluation are incorporated into the history of present illness.       Historical Information   Past Medical History:   Diagnosis Date    Acute UTI 2014    Arthritis 2016    Bladder cancer (HCC)     COPD (chronic obstructive pulmonary disease) (HCC) 2016    CPAP (continuous positive airway pressure) dependence     Dependence on nicotine from cigarettes 2016    Depression 2016    Dupuytren's disease of palm 2018    Dyspnea on exertion     GERD (gastroesophageal reflux disease)     Heart murmur     Hyperlipidemia     Hypertension     Hypokalemia 2016    Left ventricular hypertrophy     Mixed stress and urge urinary incontinence     Non-rheumatic mitral regurgitation 2023    Osteoarthritis 2016    left knee    Osteopenia     Panlobular emphysema (HCC)     Recurrent UTI     RLS (restless legs syndrome) 2016    Sleep apnea     Sleep apnea 2023     Past Surgical History:   Procedure Laterality Date    CATARACT EXTRACTION Bilateral     CYSTOSCOPY      ILEOSTOMY      DE CYSTOURETHROSCOPY N/A 11/17/2023    Procedure: CYSTOSCOPY  bilateral retrograde pyelograms;  Surgeon: Ryan Saab MD;  Location:  MAIN OR;  Service: Urology    REPLACEMENT TOTAL KNEE BILATERAL      TONSILLECTOMY       History reviewed. No pertinent family history.  Social History   Social History     Substance and Sexual Activity   Alcohol Use Yes    Alcohol/week: 2.0 standard drinks of alcohol    Types: 2 " Standard drinks or equivalent per week    Comment: Occasionally     Social History     Substance and Sexual Activity   Drug Use Not Currently     Social History     Tobacco Use   Smoking Status Former    Current packs/day: 1.00    Average packs/day: 1 pack/day for 50.0 years (50.0 ttl pk-yrs)    Types: Cigarettes   Smokeless Tobacco Never   Tobacco Comments    Has not smoked since hospital     E-Cigarette/Vaping    E-Cigarette Use Never User      E-Cigarette/Vaping Substances    Nicotine No     THC No     CBD No     Flavoring No     Other No     Unknown No        Meds/Allergies     Current Outpatient Medications:     celecoxib (CeleBREX) 200 mg capsule, Take 1 capsule (200 mg total) by mouth daily, Disp: 90 capsule, Rfl: 3    ibuprofen (MOTRIN) 600 mg tablet, , Disp: , Rfl:     levalbuterol (Xopenex HFA) 45 mcg/act inhaler, Inhale 1-2 puffs every 4 (four) hours as needed for wheezing, Disp: 15 g, Rfl: 3    levalbuterol (Xopenex) 0.63 mg/3 mL nebulizer solution, Take 3 mL (0.63 mg total) by nebulization every 6 (six) hours as needed for wheezing or shortness of breath, Disp: 75 mL, Rfl: 1    metoprolol succinate (Toprol XL) 25 mg 24 hr tablet, 25 mg, Disp: , Rfl:     omeprazole (PriLOSEC) 20 mg delayed release capsule, , Disp: , Rfl:     pramipexole (MIRAPEX) 0.5 mg tablet, Take 1 tablet by mouth 2 (two) times a day, Disp: , Rfl:     rosuvastatin (CRESTOR) 5 mg tablet, Take 5 mg by mouth daily, Disp: , Rfl:     sertraline (ZOLOFT) 100 mg tablet, Take 100 mg by mouth daily, Disp: , Rfl:     telmisartan-hydrochlorothiazide (MICARDIS HCT) 80-25 MG per tablet, , Disp: , Rfl:     Trelegy Ellipta 200-62.5-25 MCG/ACT AEPB inhaler, Inhale 1 puff daily, Disp: 180 blister, Rfl: 3    aspirin (ECOTRIN LOW STRENGTH) 81 mg EC tablet, Take 81 mg by mouth daily (Patient not taking: Reported on 10/10/2024), Disp: , Rfl:     ipratropium (ATROVENT) 0.02 % nebulizer solution, Take 2.5 mL (0.5 mg total) by nebulization every 6 (six)  "hours as needed for wheezing or shortness of breath (Patient not taking: Reported on 10/10/2024), Disp: 62.5 mL, Rfl: 1    phenazopyridine (PYRIDIUM) 200 mg tablet, Take 1 tablet (200 mg total) by mouth 3 (three) times a day (Patient not taking: Reported on 10/10/2024), Disp: 6 tablet, Rfl: 0  Allergies   Allergen Reactions    Penicillins Hives    Morphine GI Intolerance     n, v       Vitals: Blood pressure 150/80, pulse 71, temperature (!) 97.3 °F (36.3 °C), resp. rate 20, height 5' 4\" (1.626 m), weight 92.4 kg (203 lb 9.6 oz), SpO2 96%. Body mass index is 34.95 kg/m². Oxygen Therapy  SpO2: 96 %      Physical Exam  Vitals reviewed.   Constitutional:       Appearance: Normal appearance.   HENT:      Head: Normocephalic.      Nose: Nose normal.      Mouth/Throat:      Mouth: Mucous membranes are moist.      Pharynx: Oropharynx is clear.   Cardiovascular:      Rate and Rhythm: Normal rate and regular rhythm.      Pulses: Normal pulses.      Heart sounds: Normal heart sounds.   Pulmonary:      Effort: Pulmonary effort is normal.      Breath sounds: Normal breath sounds.   Musculoskeletal:         General: Normal range of motion.   Skin:     General: Skin is warm.      Capillary Refill: Capillary refill takes less than 2 seconds.   Neurological:      General: No focal deficit present.      Mental Status: She is alert and oriented to person, place, and time.   Psychiatric:         Mood and Affect: Mood normal.         Behavior: Behavior normal.         Labs:   Lab Results   Component Value Date    WBC 9.49 08/15/2024    HGB 13.9 08/15/2024    HCT 42.5 08/15/2024    MCV 95 08/15/2024     08/15/2024    EOSPCT 1 08/15/2024    EOSABS 0.12 08/15/2024    MONOPCT 7 08/15/2024     Lab Results   Component Value Date    CALCIUM 9.7 08/15/2024    K 3.8 08/15/2024    CO2 27 08/15/2024     08/15/2024    BUN 20 08/15/2024    CREATININE 0.97 08/15/2024     No results found for: \"IGE\", \"RAST\"  Lab Results   Component Value " "Date    ALT 13 08/15/2024    AST 14 08/15/2024    ALKPHOS 59 08/15/2024         Imaging and other studies: Results Review Statement: I reviewed radiology reports from this admission including: CT abdomen/pelvis.  CT abd pelvis 8/15/24  LOWER CHEST: Redemonstrated subpleural reticulation within the bilateral lung bases with superimposed emphysema. Small hiatal hernia.   IMPRESSION:  1.  Findings compatible with cystitis. No hydroureteronephrosis or nephroureterolithiasis.  2.  Redemonstrated subpleural fibrosis within the bilateral lung bases, better assessed on prior chest CT.  3.  Previously described thickened endometrium is not well evaluated by unenhanced CT. Recommend continued management per patient's established gynecologist    Pulmonary Results (PFTs, PSG): results reviewed  6/18/24:  Results:  The FVC is normal [79%]  The FEV1 is mildly reduced [70%]  The FEV1/FVC is reduced    There is a significant bronchodilator response.    The TLC is normal. The RV is normal.    The DLCO is moderately reduced [54%].    The Flow-Volume Loop demonstrates coving the expiratory loop.    Impression:  The patient has evidence of mild obstruction. The flow volume curve  demonstrates obstructive pattern.   There is a significant bronchodilator  response.  The diffusion capacity is moderately reduced.       6 MINUTE WALK:  Resting / room air: 97%, HR 68  Patient ambulated for 2 minutes and pulse ox decreased to 87%. She did deep breathing with ongoing ambulation and continued to have saturations 85-87% at 5 minutes. Her legs grew fatigued and the test was stopped at 5 minutes, with ending .    MELONIE Ortiz  [facility]        Portions of the record may have been created with voice recognition software.  Occasional wrong word or \"sound a like\" substitutions may have occurred due to the inherent limitations of voice recognition software.  Read the chart carefully and recognize, using " context, where substitutions have occurred or contact the dictating provider.

## 2024-10-10 NOTE — ASSESSMENT & PLAN NOTE
Due to ZINA  Compliance reviewed today 9/9/24-10/8/24, [employer]:  She has used 29/30 days (96%)  Average nightly use 10h 11m (96% > 4h)  Residual AHI 2.6    Continue CPAP for all nights of sleep

## 2024-10-11 LAB
DME PARACHUTE DELIVERY DATE EXPECTED: NORMAL
DME PARACHUTE DELIVERY DATE REQUESTED: NORMAL
DME PARACHUTE ITEM DESCRIPTION: NORMAL
DME PARACHUTE ORDER STATUS: NORMAL
DME PARACHUTE SUPPLIER NAME: NORMAL
DME PARACHUTE SUPPLIER PHONE: NORMAL
ENA SCL70 AB SER-ACNC: <0.2 AI (ref 0–0.9)
ENA SS-A AB SER-ACNC: <0.2 AI (ref 0–0.9)
ENA SS-B AB SER-ACNC: <0.2 AI (ref 0–0.9)
RHEUMATOID FACT SER QL LA: NEGATIVE

## 2024-10-14 LAB
DME PARACHUTE DELIVERY DATE ACTUAL: NORMAL
DME PARACHUTE DELIVERY DATE EXPECTED: NORMAL
DME PARACHUTE DELIVERY DATE REQUESTED: NORMAL
DME PARACHUTE ITEM DESCRIPTION: NORMAL
DME PARACHUTE ORDER STATUS: NORMAL
DME PARACHUTE SUPPLIER NAME: NORMAL
DME PARACHUTE SUPPLIER PHONE: NORMAL

## 2024-10-15 ENCOUNTER — HOSPITAL ENCOUNTER (OUTPATIENT)
Dept: CT IMAGING | Facility: HOSPITAL | Age: 74
Discharge: HOME | End: 2024-10-15
Payer: MEDICARE

## 2024-10-15 DIAGNOSIS — R91.8 PULMONARY NODULES: ICD-10-CM

## 2024-10-15 PROCEDURE — 71250 CT THORAX DX C-: CPT

## 2024-10-16 LAB
ASPERGILLUS FUMAGATUS IGG: NEGATIVE
AUREOBASIDIUM PULLULANS IGG: NEGATIVE
C-ANCA TITR SER IF: ABNORMAL TITER
LACEYELLA SACCHARI AB SER QL: NEGATIVE
MYELOPEROXIDASE AB SER IA-ACNC: <0.2 UNITS (ref 0–0.9)
P-ANCA ATYPICAL TITR SER IF: ABNORMAL TITER
P-ANCA TITR SER IF: ABNORMAL TITER
PIGEON SERUM IGG: NEGATIVE
PROTEINASE3 AB SER IA-ACNC: <0.2 UNITS (ref 0–0.9)
S RECTIVIRGULA AB SER QL ID: NEGATIVE
T VULGARIS AB SER QL ID: NEGATIVE

## 2024-10-17 ENCOUNTER — TELEPHONE (OUTPATIENT)
Dept: PULMONOLOGY | Facility: CLINIC | Age: 74
End: 2024-10-17

## 2024-10-22 ENCOUNTER — TELEPHONE (OUTPATIENT)
Dept: PULMONOLOGY | Facility: CLINIC | Age: 74
End: 2024-10-22

## 2024-10-22 DIAGNOSIS — J44.9 CHRONIC OBSTRUCTIVE PULMONARY DISEASE, UNSPECIFIED COPD TYPE (HCC): Primary | ICD-10-CM

## 2024-10-22 RX ORDER — AZITHROMYCIN 250 MG/1
TABLET, FILM COATED ORAL
Qty: 6 TABLET | Refills: 0 | Status: SHIPPED | OUTPATIENT
Start: 2024-10-22 | End: 2024-10-26

## 2024-10-22 NOTE — PROGRESS NOTES
Patient called - lab and radiology results were given.  She has a sinus infection with discolored mucus and feels it going to her chest  I sent antibiotic  She will call if not improving.

## 2024-12-01 ENCOUNTER — APPOINTMENT (EMERGENCY)
Dept: CT IMAGING | Facility: HOSPITAL | Age: 74
DRG: 193 | End: 2024-12-01
Payer: MEDICARE

## 2024-12-01 ENCOUNTER — HOSPITAL ENCOUNTER (INPATIENT)
Facility: HOSPITAL | Age: 74
LOS: 2 days | Discharge: HOME | DRG: 193 | End: 2024-12-03
Attending: EMERGENCY MEDICINE | Admitting: FAMILY MEDICINE
Payer: MEDICARE

## 2024-12-01 ENCOUNTER — APPOINTMENT (EMERGENCY)
Dept: RADIOLOGY | Facility: HOSPITAL | Age: 74
DRG: 193 | End: 2024-12-01
Payer: MEDICARE

## 2024-12-01 DIAGNOSIS — R06.00 DYSPNEA, UNSPECIFIED TYPE: ICD-10-CM

## 2024-12-01 DIAGNOSIS — J44.1 COPD EXACERBATION (HCC): Primary | ICD-10-CM

## 2024-12-01 DIAGNOSIS — J44.0 CHRONIC OBSTRUCTIVE PULMONARY DISEASE WITH ACUTE LOWER RESPIRATORY INFECTION (HCC): ICD-10-CM

## 2024-12-01 DIAGNOSIS — R09.02 HYPOXIA: ICD-10-CM

## 2024-12-01 DIAGNOSIS — J18.9 PNEUMONIA: ICD-10-CM

## 2024-12-01 PROBLEM — Z72.0 TOBACCO ABUSE: Status: ACTIVE | Noted: 2024-12-01

## 2024-12-01 PROBLEM — G47.33 OSA (OBSTRUCTIVE SLEEP APNEA): Status: ACTIVE | Noted: 2024-12-01

## 2024-12-01 PROBLEM — J96.21 ACUTE ON CHRONIC RESPIRATORY FAILURE WITH HYPOXIA (HCC): Status: ACTIVE | Noted: 2024-12-01

## 2024-12-01 LAB
2HR DELTA HS TROPONIN: 1 NG/L
4HR DELTA HS TROPONIN: -2 NG/L
ALBUMIN SERPL BCG-MCNC: 4 G/DL (ref 3.5–5)
ALP SERPL-CCNC: 91 U/L (ref 34–104)
ALT SERPL W P-5'-P-CCNC: 14 U/L (ref 7–52)
ANION GAP SERPL CALCULATED.3IONS-SCNC: 11 MMOL/L (ref 4–13)
APTT PPP: 35 SECONDS (ref 23–34)
AST SERPL W P-5'-P-CCNC: 18 U/L (ref 13–39)
BASOPHILS # BLD AUTO: 0.06 THOUSANDS/ΜL (ref 0–0.1)
BASOPHILS NFR BLD AUTO: 1 % (ref 0–1)
BILIRUB SERPL-MCNC: 1.05 MG/DL (ref 0.2–1)
BNP SERPL-MCNC: 198 PG/ML (ref 0–100)
BUN SERPL-MCNC: 16 MG/DL (ref 5–25)
CALCIUM SERPL-MCNC: 9.3 MG/DL (ref 8.4–10.2)
CARDIAC TROPONIN I PNL SERPL HS: 10 NG/L (ref ?–50)
CARDIAC TROPONIN I PNL SERPL HS: 11 NG/L (ref ?–50)
CARDIAC TROPONIN I PNL SERPL HS: 8 NG/L (ref ?–50)
CHLORIDE SERPL-SCNC: 101 MMOL/L (ref 96–108)
CO2 SERPL-SCNC: 24 MMOL/L (ref 21–32)
CREAT SERPL-MCNC: 0.79 MG/DL (ref 0.6–1.3)
D DIMER PPP FEU-MCNC: 2.13 UG/ML FEU
EOSINOPHIL # BLD AUTO: 0.11 THOUSAND/ΜL (ref 0–0.61)
EOSINOPHIL NFR BLD AUTO: 1 % (ref 0–6)
ERYTHROCYTE [DISTWIDTH] IN BLOOD BY AUTOMATED COUNT: 13.3 % (ref 11.6–15.1)
FLUAV AG UPPER RESP QL IA.RAPID: NEGATIVE
FLUBV AG UPPER RESP QL IA.RAPID: NEGATIVE
GFR SERPL CREATININE-BSD FRML MDRD: 75 ML/MIN/1.73SQ M
GLUCOSE SERPL-MCNC: 121 MG/DL (ref 65–140)
HCT VFR BLD AUTO: 38.3 % (ref 34.8–46.1)
HGB BLD-MCNC: 12.3 G/DL (ref 11.5–15.4)
IMM GRANULOCYTES # BLD AUTO: 0.07 THOUSAND/UL (ref 0–0.2)
IMM GRANULOCYTES NFR BLD AUTO: 1 % (ref 0–2)
INR PPP: 1.08 (ref 0.85–1.19)
LYMPHOCYTES # BLD AUTO: 1.22 THOUSANDS/ΜL (ref 0.6–4.47)
LYMPHOCYTES NFR BLD AUTO: 10 % (ref 14–44)
MCH RBC QN AUTO: 31.5 PG (ref 26.8–34.3)
MCHC RBC AUTO-ENTMCNC: 32.1 G/DL (ref 31.4–37.4)
MCV RBC AUTO: 98 FL (ref 82–98)
MONOCYTES # BLD AUTO: 1.12 THOUSAND/ΜL (ref 0.17–1.22)
MONOCYTES NFR BLD AUTO: 9 % (ref 4–12)
NEUTROPHILS # BLD AUTO: 9.61 THOUSANDS/ΜL (ref 1.85–7.62)
NEUTS SEG NFR BLD AUTO: 78 % (ref 43–75)
NRBC BLD AUTO-RTO: 0 /100 WBCS
PLATELET # BLD AUTO: 198 THOUSANDS/UL (ref 149–390)
PMV BLD AUTO: 9.3 FL (ref 8.9–12.7)
POTASSIUM SERPL-SCNC: 4.1 MMOL/L (ref 3.5–5.3)
PROCALCITONIN SERPL-MCNC: 0.77 NG/ML
PROT SERPL-MCNC: 7.3 G/DL (ref 6.4–8.4)
PROTHROMBIN TIME: 14.4 SECONDS (ref 12.3–15)
RBC # BLD AUTO: 3.9 MILLION/UL (ref 3.81–5.12)
SARS-COV+SARS-COV-2 AG RESP QL IA.RAPID: NEGATIVE
SODIUM SERPL-SCNC: 136 MMOL/L (ref 135–147)
WBC # BLD AUTO: 12.19 THOUSAND/UL (ref 4.31–10.16)

## 2024-12-01 PROCEDURE — 87804 INFLUENZA ASSAY W/OPTIC: CPT | Performed by: EMERGENCY MEDICINE

## 2024-12-01 PROCEDURE — 71275 CT ANGIOGRAPHY CHEST: CPT

## 2024-12-01 PROCEDURE — 99285 EMERGENCY DEPT VISIT HI MDM: CPT

## 2024-12-01 PROCEDURE — 94760 N-INVAS EAR/PLS OXIMETRY 1: CPT

## 2024-12-01 PROCEDURE — 80053 COMPREHEN METABOLIC PANEL: CPT | Performed by: EMERGENCY MEDICINE

## 2024-12-01 PROCEDURE — 74177 CT ABD & PELVIS W/CONTRAST: CPT

## 2024-12-01 PROCEDURE — 94664 DEMO&/EVAL PT USE INHALER: CPT

## 2024-12-01 PROCEDURE — 36415 COLL VENOUS BLD VENIPUNCTURE: CPT | Performed by: EMERGENCY MEDICINE

## 2024-12-01 PROCEDURE — 87040 BLOOD CULTURE FOR BACTERIA: CPT | Performed by: EMERGENCY MEDICINE

## 2024-12-01 PROCEDURE — 94640 AIRWAY INHALATION TREATMENT: CPT

## 2024-12-01 PROCEDURE — 85730 THROMBOPLASTIN TIME PARTIAL: CPT | Performed by: EMERGENCY MEDICINE

## 2024-12-01 PROCEDURE — 99285 EMERGENCY DEPT VISIT HI MDM: CPT | Performed by: EMERGENCY MEDICINE

## 2024-12-01 PROCEDURE — 96374 THER/PROPH/DIAG INJ IV PUSH: CPT

## 2024-12-01 PROCEDURE — 84484 ASSAY OF TROPONIN QUANT: CPT | Performed by: EMERGENCY MEDICINE

## 2024-12-01 PROCEDURE — 84145 PROCALCITONIN (PCT): CPT | Performed by: FAMILY MEDICINE

## 2024-12-01 PROCEDURE — 85379 FIBRIN DEGRADATION QUANT: CPT | Performed by: EMERGENCY MEDICINE

## 2024-12-01 PROCEDURE — 85610 PROTHROMBIN TIME: CPT | Performed by: EMERGENCY MEDICINE

## 2024-12-01 PROCEDURE — 93005 ELECTROCARDIOGRAM TRACING: CPT

## 2024-12-01 PROCEDURE — 83880 ASSAY OF NATRIURETIC PEPTIDE: CPT | Performed by: EMERGENCY MEDICINE

## 2024-12-01 PROCEDURE — 87811 SARS-COV-2 COVID19 W/OPTIC: CPT | Performed by: EMERGENCY MEDICINE

## 2024-12-01 PROCEDURE — 99223 1ST HOSP IP/OBS HIGH 75: CPT | Performed by: FAMILY MEDICINE

## 2024-12-01 PROCEDURE — 71045 X-RAY EXAM CHEST 1 VIEW: CPT

## 2024-12-01 PROCEDURE — 85025 COMPLETE CBC W/AUTO DIFF WBC: CPT | Performed by: EMERGENCY MEDICINE

## 2024-12-01 RX ORDER — NICOTINE 21 MG/24HR
21 PATCH, TRANSDERMAL 24 HOURS TRANSDERMAL DAILY
Status: DISCONTINUED | OUTPATIENT
Start: 2024-12-01 | End: 2024-12-03 | Stop reason: HOSPADM

## 2024-12-01 RX ORDER — METHYLPREDNISOLONE SODIUM SUCCINATE 40 MG/ML
40 INJECTION, POWDER, LYOPHILIZED, FOR SOLUTION INTRAMUSCULAR; INTRAVENOUS EVERY 8 HOURS
Status: DISCONTINUED | OUTPATIENT
Start: 2024-12-01 | End: 2024-12-03

## 2024-12-01 RX ORDER — PRAVASTATIN SODIUM 40 MG
40 TABLET ORAL
Status: DISCONTINUED | OUTPATIENT
Start: 2024-12-01 | End: 2024-12-03 | Stop reason: HOSPADM

## 2024-12-01 RX ORDER — METHYLPREDNISOLONE SODIUM SUCCINATE 125 MG/2ML
100 INJECTION, POWDER, LYOPHILIZED, FOR SOLUTION INTRAMUSCULAR; INTRAVENOUS ONCE
Status: COMPLETED | OUTPATIENT
Start: 2024-12-01 | End: 2024-12-01

## 2024-12-01 RX ORDER — IPRATROPIUM BROMIDE AND ALBUTEROL SULFATE 2.5; .5 MG/3ML; MG/3ML
3 SOLUTION RESPIRATORY (INHALATION) ONCE
Status: COMPLETED | OUTPATIENT
Start: 2024-12-01 | End: 2024-12-01

## 2024-12-01 RX ORDER — CELECOXIB 200 MG/1
200 CAPSULE ORAL DAILY
Status: DISCONTINUED | OUTPATIENT
Start: 2024-12-02 | End: 2024-12-03 | Stop reason: HOSPADM

## 2024-12-01 RX ORDER — PANTOPRAZOLE SODIUM 40 MG/1
40 TABLET, DELAYED RELEASE ORAL
Status: DISCONTINUED | OUTPATIENT
Start: 2024-12-02 | End: 2024-12-03 | Stop reason: HOSPADM

## 2024-12-01 RX ORDER — CEFTRIAXONE 1 G/50ML
1000 INJECTION, SOLUTION INTRAVENOUS ONCE
Status: COMPLETED | OUTPATIENT
Start: 2024-12-01 | End: 2024-12-01

## 2024-12-01 RX ORDER — METOPROLOL SUCCINATE 25 MG/1
25 TABLET, EXTENDED RELEASE ORAL DAILY
Status: DISCONTINUED | OUTPATIENT
Start: 2024-12-02 | End: 2024-12-03 | Stop reason: HOSPADM

## 2024-12-01 RX ORDER — LOSARTAN POTASSIUM 50 MG/1
100 TABLET ORAL DAILY
Status: DISCONTINUED | OUTPATIENT
Start: 2024-12-02 | End: 2024-12-03 | Stop reason: HOSPADM

## 2024-12-01 RX ORDER — ALBUTEROL SULFATE 0.83 MG/ML
2.5 SOLUTION RESPIRATORY (INHALATION)
Status: DISCONTINUED | OUTPATIENT
Start: 2024-12-01 | End: 2024-12-01

## 2024-12-01 RX ORDER — CEFTRIAXONE 1 G/50ML
1000 INJECTION, SOLUTION INTRAVENOUS EVERY 24 HOURS
Status: DISCONTINUED | OUTPATIENT
Start: 2024-12-02 | End: 2024-12-03 | Stop reason: HOSPADM

## 2024-12-01 RX ORDER — SERTRALINE HYDROCHLORIDE 100 MG/1
100 TABLET, FILM COATED ORAL DAILY
Status: DISCONTINUED | OUTPATIENT
Start: 2024-12-02 | End: 2024-12-03 | Stop reason: HOSPADM

## 2024-12-01 RX ORDER — BUDESONIDE 0.5 MG/2ML
0.5 INHALANT ORAL
Status: DISCONTINUED | OUTPATIENT
Start: 2024-12-01 | End: 2024-12-03 | Stop reason: HOSPADM

## 2024-12-01 RX ORDER — HYDROCHLOROTHIAZIDE 25 MG/1
25 TABLET ORAL DAILY
Status: DISCONTINUED | OUTPATIENT
Start: 2024-12-02 | End: 2024-12-03 | Stop reason: HOSPADM

## 2024-12-01 RX ORDER — BENZONATATE 100 MG/1
100 CAPSULE ORAL 3 TIMES DAILY PRN
Status: DISCONTINUED | OUTPATIENT
Start: 2024-12-01 | End: 2024-12-03 | Stop reason: HOSPADM

## 2024-12-01 RX ORDER — HEPARIN SODIUM 5000 [USP'U]/ML
5000 INJECTION, SOLUTION INTRAVENOUS; SUBCUTANEOUS EVERY 8 HOURS SCHEDULED
Status: DISCONTINUED | OUTPATIENT
Start: 2024-12-01 | End: 2024-12-03 | Stop reason: HOSPADM

## 2024-12-01 RX ORDER — NYSTATIN 100000 [USP'U]/ML
500000 SUSPENSION ORAL 4 TIMES DAILY
Status: DISCONTINUED | OUTPATIENT
Start: 2024-12-01 | End: 2024-12-03 | Stop reason: HOSPADM

## 2024-12-01 RX ORDER — ACETAMINOPHEN 325 MG/1
650 TABLET ORAL EVERY 6 HOURS PRN
Status: DISCONTINUED | OUTPATIENT
Start: 2024-12-01 | End: 2024-12-03 | Stop reason: HOSPADM

## 2024-12-01 RX ORDER — ALBUTEROL SULFATE 0.83 MG/ML
2.5 SOLUTION RESPIRATORY (INHALATION)
Status: DISCONTINUED | OUTPATIENT
Start: 2024-12-01 | End: 2024-12-02

## 2024-12-01 RX ORDER — PRAMIPEXOLE DIHYDROCHLORIDE 0.5 MG/1
0.5 TABLET ORAL 2 TIMES DAILY
Status: DISCONTINUED | OUTPATIENT
Start: 2024-12-01 | End: 2024-12-03 | Stop reason: HOSPADM

## 2024-12-01 RX ADMIN — IPRATROPIUM BROMIDE AND ALBUTEROL SULFATE 3 ML: .5; 3 SOLUTION RESPIRATORY (INHALATION) at 12:59

## 2024-12-01 RX ADMIN — HEPARIN SODIUM 5000 UNITS: 5000 INJECTION, SOLUTION INTRAVENOUS; SUBCUTANEOUS at 15:04

## 2024-12-01 RX ADMIN — IPRATROPIUM BROMIDE AND ALBUTEROL SULFATE 3 ML: .5; 3 SOLUTION RESPIRATORY (INHALATION) at 09:41

## 2024-12-01 RX ADMIN — BENZONATATE 100 MG: 100 CAPSULE ORAL at 15:30

## 2024-12-01 RX ADMIN — NYSTATIN 500000 UNITS: 100000 SUSPENSION ORAL at 21:53

## 2024-12-01 RX ADMIN — AZITHROMYCIN MONOHYDRATE 500 MG: 500 INJECTION, POWDER, LYOPHILIZED, FOR SOLUTION INTRAVENOUS at 14:21

## 2024-12-01 RX ADMIN — NYSTATIN 500000 UNITS: 100000 SUSPENSION ORAL at 18:33

## 2024-12-01 RX ADMIN — METHYLPREDNISOLONE SODIUM SUCCINATE 40 MG: 40 INJECTION, POWDER, FOR SOLUTION INTRAMUSCULAR; INTRAVENOUS at 21:53

## 2024-12-01 RX ADMIN — CEFTRIAXONE 1000 MG: 1 INJECTION, SOLUTION INTRAVENOUS at 13:44

## 2024-12-01 RX ADMIN — PRAVASTATIN SODIUM 40 MG: 40 TABLET ORAL at 15:30

## 2024-12-01 RX ADMIN — METHYLPREDNISOLONE SODIUM SUCCINATE 40 MG: 40 INJECTION, POWDER, FOR SOLUTION INTRAMUSCULAR; INTRAVENOUS at 15:04

## 2024-12-01 RX ADMIN — ALBUTEROL SULFATE 2.5 MG: 2.5 SOLUTION RESPIRATORY (INHALATION) at 19:29

## 2024-12-01 RX ADMIN — NYSTATIN 500000 UNITS: 100000 SUSPENSION ORAL at 15:05

## 2024-12-01 RX ADMIN — HEPARIN SODIUM 5000 UNITS: 5000 INJECTION, SOLUTION INTRAVENOUS; SUBCUTANEOUS at 21:53

## 2024-12-01 RX ADMIN — IOHEXOL 75 ML: 350 INJECTION, SOLUTION INTRAVENOUS at 11:35

## 2024-12-01 RX ADMIN — NICOTINE 21 MG: 21 PATCH, EXTENDED RELEASE TRANSDERMAL at 15:05

## 2024-12-01 RX ADMIN — METHYLPREDNISOLONE SODIUM SUCCINATE 100 MG: 125 INJECTION, POWDER, FOR SOLUTION INTRAMUSCULAR; INTRAVENOUS at 10:08

## 2024-12-01 RX ADMIN — PRAMIPEXOLE DIHYDROCHLORIDE 0.5 MG: 0.5 TABLET ORAL at 18:33

## 2024-12-01 RX ADMIN — BUDESONIDE INHALATION 0.5 MG: 0.5 SUSPENSION RESPIRATORY (INHALATION) at 19:30

## 2024-12-01 RX ADMIN — IPRATROPIUM BROMIDE 0.5 MG: 0.5 SOLUTION RESPIRATORY (INHALATION) at 19:29

## 2024-12-01 NOTE — ASSESSMENT & PLAN NOTE
Evident on ct  Wbc elevated  Check procal  Rocephin/zithromax  Covid flu neg  Check urine legionella /urine strep  Sputum culture

## 2024-12-01 NOTE — ASSESSMENT & PLAN NOTE
Uses 2 liters of oxygen with cpap at night  81 % on RA ambulatory now on oxygen   Due to pna and copd exacerbation

## 2024-12-01 NOTE — ED NOTES
Patient ambulated down hallway with O2 saturation of 81% on RA, . Provider made aware      Shelli Do RN  12/01/24 0914     Stable

## 2024-12-01 NOTE — ED PROVIDER NOTES
Time reflects when diagnosis was documented in both MDM as applicable and the Disposition within this note       Time User Action Codes Description Comment    12/1/2024  9:32 AM Odin Cox [J44.1] COPD exacerbation (HCC)     12/1/2024  9:32 AM Odin Cox [R06.00] Dyspnea, unspecified type     12/1/2024  9:32 AM Odin Cox Add [R09.02] Hypoxia     12/1/2024  1:30 PM Odin Cox [J18.9] Pneumonia           ED Disposition       ED Disposition   Admit    Condition   Stable    Date/Time   Sun Dec 1, 2024  1:30 PM    Comment   Case was discussed with hospital medicine and the patient's admission status was agreed to be Admission Status: inpatient status to the service of Dr. Tellez .               Assessment & Plan       Medical Decision Making  74-year-old female presents to the emergency department with complaint of cough, productive of mucus, and wheezing, differential diagnosis include COVID, influenza, pneumonia, bronchitis, pneumonitis, pulmonary embolism, pleural effusion, CHF/COPD exacerbation, ACS, will perform cardiopulmonary workup, D-dimer, provide breathing treatment, steroids, and reassess.    Patient ambulated in the emergency department with O2 saturation of 81, and became tacky to 120s.    Discussed case with hospital medicine, patient will be admitted for further evaluation.    Amount and/or Complexity of Data Reviewed  Labs: ordered.  Radiology: ordered.    Risk  Prescription drug management.  Decision regarding hospitalization.        ED Course as of 12/01/24 1331   Sun Dec 01, 2024   1254 IMPRESSION:  New bilateral pulmonary infiltrates likely reflecting multifocal pneumonia.  Emphysema.  No pulmonary embolism identified.  No acute intra-abdominal pathology.  Hepatomegaly.  Chronic findings, as per the body of the report.     1329 Ambulatory pulse ox of 81% and tachy to 120s. Discussed case with hospital medicine, patient will be admitted for further evaluation.         Medications   cefTRIAXone (ROCEPHIN) IVPB (premix in dextrose) 1,000 mg 50 mL (has no administration in time range)   azithromycin (ZITHROMAX) 500 mg in sodium chloride 0.9 % 250 mL IVPB (has no administration in time range)   methylPREDNISolone sodium succinate (Solu-MEDROL) injection 100 mg (100 mg Intravenous Given 12/1/24 1008)   ipratropium-albuterol (DUO-NEB) 0.5-2.5 mg/3 mL inhalation solution 3 mL (3 mL Nebulization Given 12/1/24 0941)   iohexol (OMNIPAQUE) 350 MG/ML injection (MULTI-DOSE) 75 mL (75 mL Intravenous Given 12/1/24 1135)   ipratropium-albuterol (DUO-NEB) 0.5-2.5 mg/3 mL inhalation solution 3 mL (3 mL Nebulization Given 12/1/24 1259)       ED Risk Strat Scores   HEART Risk Score      Flowsheet Row Most Recent Value   Heart Score Risk Calculator    History 0 Filed at: 12/01/2024 0928   ECG 1 Filed at: 12/01/2024 0928   Age 2 Filed at: 12/01/2024 0928   Risk Factors 1 Filed at: 12/01/2024 0928   Troponin 0 Filed at: 12/01/2024 0928   HEART Score 4 Filed at: 12/01/2024 0928                               SBIRT 20yo+      Flowsheet Row Most Recent Value   Initial Alcohol Screen: US AUDIT-C     1. How often do you have a drink containing alcohol? 0 Filed at: 12/01/2024 0924   2. How many drinks containing alcohol do you have on a typical day you are drinking?  0 Filed at: 12/01/2024 0924   3b. FEMALE Any Age, or MALE 65+: How often do you have 4 or more drinks on one occassion? 0 Filed at: 12/01/2024 0924   Audit-C Score 0 Filed at: 12/01/2024 0924   COURTNEY: How many times in the past year have you...    Used an illegal drug or used a prescription medication for non-medical reasons? Never Filed at: 12/01/2024 0924                            History of Present Illness       Chief Complaint   Patient presents with    Shortness of Breath     About three days ago with increase cough and congestion.        Past Medical History:   Diagnosis Date    Acute UTI 2014    Arthritis 2016    Bladder cancer (HCC)      COPD (chronic obstructive pulmonary disease) (HCC) 2016    CPAP (continuous positive airway pressure) dependence     Dependence on nicotine from cigarettes 2016    Depression 2016    Dupuytren's disease of palm 2018    Dyspnea on exertion     GERD (gastroesophageal reflux disease)     Heart murmur     Hyperlipidemia     Hypertension     Hypokalemia 2016    Left ventricular hypertrophy     Mixed stress and urge urinary incontinence     Non-rheumatic mitral regurgitation 2023    Osteoarthritis 2016    left knee    Osteopenia     Panlobular emphysema (HCC)     Recurrent UTI     RLS (restless legs syndrome) 2016    Sleep apnea     Sleep apnea 2023      Past Surgical History:   Procedure Laterality Date    CATARACT EXTRACTION Bilateral     CYSTOSCOPY      ILEOSTOMY      ID CYSTOURETHROSCOPY N/A 11/17/2023    Procedure: CYSTOSCOPY  bilateral retrograde pyelograms;  Surgeon: Ryan Saab MD;  Location:  MAIN OR;  Service: Urology    REPLACEMENT TOTAL KNEE BILATERAL      TONSILLECTOMY        History reviewed. No pertinent family history.   Social History     Tobacco Use    Smoking status: Former     Current packs/day: 1.00     Average packs/day: 1 pack/day for 50.0 years (50.0 ttl pk-yrs)     Types: Cigarettes    Smokeless tobacco: Never    Tobacco comments:     Has not smoked since hospital   Vaping Use    Vaping status: Never Used   Substance Use Topics    Alcohol use: Yes     Alcohol/week: 2.0 standard drinks of alcohol     Types: 2 Standard drinks or equivalent per week     Comment: Occasionally    Drug use: Not Currently      E-Cigarette/Vaping    E-Cigarette Use Never User       E-Cigarette/Vaping Substances    Nicotine No     THC No     CBD No     Flavoring No     Other No     Unknown No       I have reviewed and agree with the history as documented.     74-year-old female with past medical history of COPD, on CPAP at night, presents to the emergency department with complaint of cough, productive of green  mucus associated with exertional dyspnea, and dyspnea at rest.  Patient states that she normally has her oxygen on at night, however she has required it during the day.  Patient has been using her albuterol nebulizer every 6 hours as needed, however it is not helping.  She reports chills 2 days ago, no fevers.  Patient denies any chest pain, pleuritic chest pain, hemoptysis.  She denies any GI or  complaints.  On initial evaluation, patient is hypoxic to 91% on room air with diffuse subtle wheezes.      Shortness of Breath  Associated symptoms: cough and wheezing    Associated symptoms: no abdominal pain, no chest pain, no ear pain, no fever, no rash, no sore throat and no vomiting        Review of Systems   Constitutional:  Negative for chills and fever.   HENT:  Negative for ear pain and sore throat.    Eyes:  Negative for pain and visual disturbance.   Respiratory:  Positive for cough, shortness of breath and wheezing.    Cardiovascular:  Negative for chest pain and palpitations.   Gastrointestinal:  Negative for abdominal pain and vomiting.   Genitourinary:  Negative for dysuria and hematuria.   Musculoskeletal:  Negative for arthralgias and back pain.   Skin:  Negative for color change and rash.   Neurological:  Negative for seizures and syncope.   All other systems reviewed and are negative.          Objective       ED Triage Vitals [12/01/24 0921]   Temp Pulse Blood Pressure Respirations SpO2 Patient Position - Orthostatic VS   -- 90 128/82 17 93 % --      Temp src Heart Rate Source BP Location FiO2 (%) Pain Score    -- Monitor Left arm -- No Pain      Vitals      Date and Time Temp Pulse SpO2 Resp BP Pain Score FACES Pain Rating User   12/01/24 1300 -- 83 91 % 17 127/72 -- -- SB   12/01/24 1230 -- 67 94 % 17 108/50 -- -- SB   12/01/24 1215 -- 71 95 % 17 116/58 -- -- SB   12/01/24 1145 -- 75 95 % 17 128/61 -- -- SB   12/01/24 0921 -- 90 93 % 17 128/82 No Pain -- KM            Physical Exam  Vitals and  nursing note reviewed.   Constitutional:       General: She is not in acute distress.     Appearance: She is well-developed.   HENT:      Head: Normocephalic and atraumatic.   Eyes:      Conjunctiva/sclera: Conjunctivae normal.   Cardiovascular:      Rate and Rhythm: Normal rate and regular rhythm.      Heart sounds: No murmur heard.  Pulmonary:      Effort: Pulmonary effort is normal. No respiratory distress.      Breath sounds: Decreased breath sounds and wheezing present.   Abdominal:      Palpations: Abdomen is soft.      Tenderness: There is no abdominal tenderness.   Musculoskeletal:         General: No swelling.      Cervical back: Neck supple.   Skin:     General: Skin is warm and dry.      Capillary Refill: Capillary refill takes less than 2 seconds.   Neurological:      Mental Status: She is alert.   Psychiatric:         Mood and Affect: Mood normal.         Results Reviewed       Procedure Component Value Units Date/Time    Blood culture #1 [[id number]]     Lab Status: No result Specimen: Blood     Blood culture #2 [[id number]]     Lab Status: No result Specimen: Blood     HS Troponin I 2hr [[id number]]  (Normal) Collected: 12/01/24 1126    Lab Status: Final result Specimen: Blood from Arm, Right Updated: 12/01/24 1156     hs TnI 2hr 11 ng/L      Delta 2hr hsTnI 1 ng/L     HS Troponin I 4hr [[id number]]     Lab Status: No result Specimen: Blood     D-dimer, quantitative [[id number]]  (Abnormal) Collected: 12/01/24 1011    Lab Status: Final result Specimen: Blood from Arm, Right Updated: 12/01/24 1059     D-Dimer, Quant 2.13 ug/ml FEU     Narrative:      In the evaluation for possible pulmonary embolism, in the appropriate (Well's Score of 4 or less) patient, the age adjusted d-dimer cutoff for this patient can be calculated as:    Age x 0.01 (in ug/mL) for Age-adjusted D-dimer exclusion threshold for a patient over 50 years.    APTT [[id number]]  (Abnormal) Collected: 12/01/24 1011    Lab Status: Final result  Specimen: Blood from Arm, Right Updated: 12/01/24 1043     PTT 35 seconds     Protime-INR [[id number]]  (Normal) Collected: 12/01/24 1011    Lab Status: Final result Specimen: Blood from Arm, Right Updated: 12/01/24 1043     Protime 14.4 seconds      INR 1.08    Narrative:      INR Therapeutic Range    Indication                                             INR Range      Atrial Fibrillation                                               2.0-3.0  Hypercoagulable State                                    2.0.2.3  Left Ventricular Asist Device                            2.0-3.0  Mechanical Heart Valve                                  -    Aortic(with afib, MI, embolism, HF, LA enlargement,    and/or coagulopathy)                                     2.0-3.0 (2.5-3.5)     Mitral                                                             2.5-3.5  Prosthetic/Bioprosthetic Heart Valve               2.0-3.0  Venous thromboembolism (VTE: VT, PE        2.0-3.0    B-Type Natriuretic Peptide(BNP) [[id number]]  (Abnormal) Collected: 12/01/24 0936    Lab Status: Final result Specimen: Blood from Arm, Right Updated: 12/01/24 1009      pg/mL     HS Troponin 0hr (reflex protocol) [[id number]]  (Normal) Collected: 12/01/24 0936    Lab Status: Final result Specimen: Blood from Arm, Right Updated: 12/01/24 1007     hs TnI 0hr 10 ng/L     Comprehensive metabolic panel [[id number]]  (Abnormal) Collected: 12/01/24 0936    Lab Status: Final result Specimen: Blood from Arm, Right Updated: 12/01/24 1006     Sodium 136 mmol/L      Potassium 4.1 mmol/L      Chloride 101 mmol/L      CO2 24 mmol/L      ANION GAP 11 mmol/L      BUN 16 mg/dL      Creatinine 0.79 mg/dL      Glucose 121 mg/dL      Calcium 9.3 mg/dL      AST 18 U/L      ALT 14 U/L      Alkaline Phosphatase 91 U/L      Total Protein 7.3 g/dL      Albumin 4.0 g/dL      Total Bilirubin 1.05 mg/dL      eGFR 75 ml/min/1.73sq m     Narrative:      [employer]  guidelines for Chronic Kidney Disease (CKD):     Stage 1 with normal or high GFR (GFR > 90 mL/min/1.73 square meters)    Stage 2 Mild CKD (GFR = 60-89 mL/min/1.73 square meters)    Stage 3A Moderate CKD (GFR = 45-59 mL/min/1.73 square meters)    Stage 3B Moderate CKD (GFR = 30-44 mL/min/1.73 square meters)    Stage 4 Severe CKD (GFR = 15-29 mL/min/1.73 square meters)    Stage 5 End Stage CKD (GFR <15 mL/min/1.73 square meters)  Note: GFR calculation is accurate only with a steady state creatinine    FLU/COVID Rapid Antigen (30 min. TAT) - Preferred screening test in ED [[id number]]  (Normal) Collected: 12/01/24 0936    Lab Status: Final result Specimen: Nares from Nose Updated: 12/01/24 0959     SARS COV Rapid Antigen Negative     Influenza A Rapid Antigen Negative     Influenza B Rapid Antigen Negative    Narrative:      This test has been performed using the Quidel Elana 2 FLU+SARS Antigen test under the Emergency Use Authorization (EUA). This test has been validated by the  and verified by the performing laboratory. The Elana uses lateral flow immunofluorescent sandwich assay to detect SARS-COV, Influenza A and Influenza B Antigen.     The Quidel Elana 2 SARS Antigen test does not differentiate between SARS-CoV and SARS-CoV-2.     Negative results are presumptive and may be confirmed with a molecular assay, if necessary, for patient management. Negative results do not rule out SARS-CoV-2 or influenza infection and should not be used as the sole basis for treatment or patient management decisions. A negative test result may occur if the level of antigen in a sample is below the limit of detection of this test.     Positive results are indicative of the presence of viral antigens, but do not rule out bacterial infection or co-infection with other viruses.     All test results should be used as an adjunct to clinical observations and other information available to the provider.    FOR PEDIATRIC PATIENTS -  copy/paste COVID Guidelines URL to browser: [email or web address]    CBC and differential [[id number]]  (Abnormal) Collected: 12/01/24 0936    Lab Status: Final result Specimen: Blood from Arm, Right Updated: 12/01/24 0942     WBC 12.19 Thousand/uL      RBC 3.90 Million/uL      Hemoglobin 12.3 g/dL      Hematocrit 38.3 %      MCV 98 fL      MCH 31.5 pg      MCHC 32.1 g/dL      RDW 13.3 %      MPV 9.3 fL      Platelets 198 Thousands/uL      nRBC 0 /100 WBCs      Segmented % 78 %      Immature Grans % 1 %      Lymphocytes % 10 %      Monocytes % 9 %      Eosinophils Relative 1 %      Basophils Relative 1 %      Absolute Neutrophils 9.61 Thousands/µL      Absolute Immature Grans 0.07 Thousand/uL      Absolute Lymphocytes 1.22 Thousands/µL      Absolute Monocytes 1.12 Thousand/µL      Eosinophils Absolute 0.11 Thousand/µL      Basophils Absolute 0.06 Thousands/µL             CT pe study w abdomen pelvis w contrast   Final Interpretation by Katlyn Erazo MD (12/01 1229)   New bilateral pulmonary infiltrates likely reflecting multifocal pneumonia.   Emphysema.   No pulmonary embolism identified.   No acute intra-abdominal pathology.   Hepatomegaly.   Chronic findings, as per the body of the report.                        Workstation performed: [id number]         X-ray chest 1 view portable    (Results Pending)       Procedures    ED Medication and Procedure Management   Prior to Admission Medications   Prescriptions Last Dose Informant Patient Reported? Taking?   Trelegy Ellipta 200-62.5-25 MCG/ACT AEPB inhaler   No No   Sig: Inhale 1 puff daily   aspirin (ECOTRIN LOW STRENGTH) 81 mg EC tablet   Yes No   Sig: Take 81 mg by mouth daily   Patient not taking: Reported on 10/10/2024   celecoxib (CeleBREX) 200 mg capsule   No No   Sig: Take 1 capsule (200 mg total) by mouth daily   ibuprofen (MOTRIN) 600 mg tablet   Yes No   ipratropium (ATROVENT) 0.02 % nebulizer solution    No No   Sig: Take 2.5 mL (0.5 mg total) by nebulization every 6 (six) hours as needed for wheezing or shortness of breath   Patient not taking: Reported on 10/10/2024   levalbuterol (Xopenex HFA) 45 mcg/act inhaler   No No   Sig: Inhale 1-2 puffs every 4 (four) hours as needed for wheezing   levalbuterol (Xopenex) 0.63 mg/3 mL nebulizer solution   No No   Sig: Take 3 mL (0.63 mg total) by nebulization every 6 (six) hours as needed for wheezing or shortness of breath   metoprolol succinate (Toprol XL) 25 mg 24 hr tablet   Yes No   Si mg   omeprazole (PriLOSEC) 20 mg delayed release capsule   Yes No   phenazopyridine (PYRIDIUM) 200 mg tablet   No No   Sig: Take 1 tablet (200 mg total) by mouth 3 (three) times a day   Patient not taking: Reported on 10/10/2024   pramipexole (MIRAPEX) 0.5 mg tablet   Yes No   Sig: Take 1 tablet by mouth 2 (two) times a day   rosuvastatin (CRESTOR) 5 mg tablet   Yes No   Sig: Take 5 mg by mouth daily   sertraline (ZOLOFT) 100 mg tablet   Yes No   Sig: Take 100 mg by mouth daily   telmisartan-hydrochlorothiazide (MICARDIS HCT) 80-25 MG per tablet   Yes No      Facility-Administered Medications: None     Patient's Medications   Discharge Prescriptions    No medications on file     No discharge procedures on file.  ED SEPSIS DOCUMENTATION   Time reflects when diagnosis was documented in both MDM as applicable and the Disposition within this note       Time User Action Codes Description Comment    2024  9:32 AM Odin Cox [J44.1] COPD exacerbation (HCC)     2024  9:32 AM Odin Cox [R06.00] Dyspnea, unspecified type     2024  9:32 AM Odin Cox [R09.02] Hypoxia     2024  1:30 PM Odin Cox [J18.9] Pneumonia                  Odin Cox,   24 1331

## 2024-12-01 NOTE — RESPIRATORY THERAPY NOTE
RT Protocol Note  Moriah Phillips 74 y.o. female MRN: [id number]  Unit/Bed#: -01 Encounter: [id number]    Assessment    Principal Problem:    CAP (community acquired pneumonia)  Active Problems:    Hypertension    COPD exacerbation (HCC)    Acute on chronic respiratory failure with hypoxia (HCC)    Tobacco abuse    ZINA (obstructive sleep apnea)      Home Pulmonary Medications:  Trelegy Daily PRN Albuterol  Home Devices/Therapy: (P) BiPAP/CPAP    Past Medical History:   Diagnosis Date    Acute UTI 2014    Arthritis 2016    Bladder cancer (HCC)     COPD (chronic obstructive pulmonary disease) (HCC) 2016    CPAP (continuous positive airway pressure) dependence     Dependence on nicotine from cigarettes 2016    Depression 2016    Dupuytren's disease of palm 2018    Dyspnea on exertion     GERD (gastroesophageal reflux disease)     Heart murmur     Hyperlipidemia     Hypertension     Hypokalemia 2016    Left ventricular hypertrophy     Mixed stress and urge urinary incontinence     Non-rheumatic mitral regurgitation 2023    Osteoarthritis 2016    left knee    Osteopenia     Panlobular emphysema (HCC)     Recurrent UTI     RLS (restless legs syndrome) 2016    Sleep apnea     Sleep apnea 2023     Social History     Socioeconomic History    Marital status: /Civil Union     Spouse name: None    Number of children: None    Years of education: None    Highest education level: None   Occupational History    None   Tobacco Use    Smoking status: Former     Current packs/day: 1.00     Average packs/day: 1 pack/day for 50.0 years (50.0 ttl pk-yrs)     Types: Cigarettes    Smokeless tobacco: Never    Tobacco comments:     Has not smoked since hospital   Vaping Use    Vaping status: Never Used   Substance and Sexual Activity    Alcohol use: Yes     Alcohol/week: 2.0 standard drinks of alcohol     Types: 2 Standard drinks or equivalent per week     Comment: Occasionally    Drug use: Not Currently    Sexual activity: Not  "Currently   Other Topics Concern    None   Social History Narrative    None     Social Drivers of Health     Financial Resource Strain: Not on file   Food Insecurity: No Food Insecurity (2024)    Nursing - Inadequate Food Risk Classification     Worried About Running Out of Food in the Last Year: Never true     Ran Out of Food in the Last Year: Never true     Ran Out of Food in the Last Year: 1   Transportation Needs: No Transportation Needs (2024)    Nursing - Transportation Risk Classification     Lack of Transportation: Not on file     Lack of Transportation: 2   Physical Activity: Not on file   Stress: Not on file   Social Connections: Not on file   Intimate Partner Violence: Patient Unable To Answer (2024)    Nursing IPS     Feels Physically and Emotionally Safe: Not on file     Physically Hurt by Someone: Not on file     Humiliated or Emotionally Abused by Someone: Not on file     Physically Hurt by Someone: 97     Hurt or Threatened by Someone: 97   Housing Stability: Unknown (2024)    Nursing: Inadequate Housing Risk Classification     Has Housing: Not on file     Worried About Losing Housing: Not on file     Unable to Get Utilities: Not on file     Unable to Pay for Housing in the Last Year: 2     Has Housin       Subjective         Objective    Physical Exam:   Assessment Type: (P) Assess only  General Appearance: (P) Alert, Awake  Respiratory Pattern: (P) Tachypneic  Chest Assessment: (P) Chest expansion symmetrical  Bilateral Breath Sounds: (P) Diminished  Cough: (P) Non-productive  O2 Device: (P) 3 Lnc    Vitals:  Blood pressure 108/65, pulse 89, temperature (!) 97.3 °F (36.3 °C), resp. rate 19, height 5' 4\" (1.626 m), weight 92.1 kg (203 lb), SpO2 94%.          Imaging and other studies:     O2 Device: (P) 3 Lnc     Plan    Respiratory Plan: (P) Home Bronchodilator Patient pathway        Resp Comments: (P) Pt is admitted due to pneumonia. Pt has hx of COPD uses trelegy at home " currently ordered Pulmicort, Albuterol and Atrovent. Pt states she has been sob more recently she is supposed to be wearing O2 at home and only has been using it at night and with walking. Pt currently on 3 L NC. Does use CPAP hs  will bring her own in.

## 2024-12-01 NOTE — H&P
H&P - Hospitalist   Name: Moriah Phillips 74 y.o. female I MRN: [id number]  Unit/Bed#: ED 07 I Date of Admission: 12/1/2024   Date of Service: 12/1/2024 I Hospital Day: 0     Assessment & Plan  CAP (community acquired pneumonia)  Evident on ct  Wbc elevated  Check procal  Rocephin/zithromax  Covid flu neg  Check urine legionella /urine strep  Sputum culture     Hypertension  Micardis and toprol xl  COPD exacerbation (HCC)  Solumedrol 40 mg iv q8  Pulmicort bid  Abx for pneumonia  Albuterol/ipratropium  Acute on chronic respiratory failure with hypoxia (HCC)  Uses 2 liters of oxygen with cpap at night  81 % on RA ambulatory now on oxygen   Due to pna and copd exacerbation  Tobacco abuse  1 ppd - nrt ordered   ZINA (obstructive sleep apnea)  On cpap       VTE Pharmacologic Prophylaxis: VTE Score: 3 Moderate Risk (Score 3-4) - Pharmacological DVT Prophylaxis Ordered: heparin.  Code Status: Level 1 - Full Code   Discussion with family: Updated  () at bedside.    Anticipated Length of Stay: Patient will be admitted on an inpatient basis with an anticipated length of stay of greater than 2 midnights secondary to acute hypoxic respiratory failure ,pneumonia and copd exacerbation.    History of Present Illness   Chief Complaint: cough and sob     Moriah Phillips is a 74 y.o. female with a PMH of copd  who presents with 3 days of worsening sob and green productive cough. Still smokes but havent since got sick. 4 weeks ago had a cold and completed z-pack. Also  was sick with cold.  No fevers  Positive for sob  No cp or gi symptoms     Review of Systems   Constitutional:  Negative for chills and fever.   HENT:  Negative for ear pain and sore throat.    Eyes:  Negative for pain and visual disturbance.   Respiratory:  Positive for cough and shortness of breath.    Cardiovascular:  Negative for chest pain and palpitations.   Gastrointestinal:  Negative for abdominal pain and vomiting.   Genitourinary:   Negative for dysuria and hematuria.   Musculoskeletal:  Negative for arthralgias and back pain.   Skin:  Negative for color change and rash.   Neurological:  Negative for seizures and syncope.   All other systems reviewed and are negative.      Historical Information   Past Medical History:   Diagnosis Date    Acute UTI 2014    Arthritis 2016    Bladder cancer (HCC)     COPD (chronic obstructive pulmonary disease) (HCC) 2016    CPAP (continuous positive airway pressure) dependence     Dependence on nicotine from cigarettes 2016    Depression 2016    Dupuytren's disease of palm 2018    Dyspnea on exertion     GERD (gastroesophageal reflux disease)     Heart murmur     Hyperlipidemia     Hypertension     Hypokalemia 2016    Left ventricular hypertrophy     Mixed stress and urge urinary incontinence     Non-rheumatic mitral regurgitation 2023    Osteoarthritis 2016    left knee    Osteopenia     Panlobular emphysema (HCC)     Recurrent UTI     RLS (restless legs syndrome) 2016    Sleep apnea     Sleep apnea 2023     Past Surgical History:   Procedure Laterality Date    CATARACT EXTRACTION Bilateral     CYSTOSCOPY      ILEOSTOMY      [place] CYSTOURETHROSCOPY N/A 11/17/2023    Procedure: CYSTOSCOPY  bilateral retrograde pyelograms;  Surgeon: Ryan Saab MD;  Location:  MAIN OR;  Service: Urology    REPLACEMENT TOTAL KNEE BILATERAL      TONSILLECTOMY       Social History     Tobacco Use    Smoking status: Former     Current packs/day: 1.00     Average packs/day: 1 pack/day for 50.0 years (50.0 ttl pk-yrs)     Types: Cigarettes    Smokeless tobacco: Never    Tobacco comments:     Has not smoked since hospital   Vaping Use    Vaping status: Never Used   Substance and Sexual Activity    Alcohol use: Yes     Alcohol/week: 2.0 standard drinks of alcohol     Types: 2 Standard drinks or equivalent per week     Comment: Occasionally    Drug use: Not Currently    Sexual activity: Not Currently     E-Cigarette/Vaping    E-Cigarette  Use Never User      E-Cigarette/Vaping Substances    Nicotine No     THC No     CBD No     Flavoring No     Other No     Unknown No      History reviewed. No pertinent family history.  Social History:  Marital Status: /Civil Union     Meds/Allergies   I have reviewed home medications with patient personally.  Prior to Admission medications    Medication Sig Start Date End Date Taking? Authorizing Provider   aspirin (ECOTRIN LOW STRENGTH) 81 mg EC tablet Take 81 mg by mouth daily  Patient not taking: Reported on 10/10/2024    Historical Provider, MD   celecoxib (CeleBREX) 200 mg capsule Take 1 capsule (200 mg total) by mouth daily 10/10/24   MELONIE Ortiz   ibuprofen (MOTRIN) 600 mg tablet  1/25/24   Historical Provider, MD   ipratropium (ATROVENT) 0.02 % nebulizer solution Take 2.5 mL (0.5 mg total) by nebulization every 6 (six) hours as needed for wheezing or shortness of breath  Patient not taking: Reported on 10/10/2024 3/28/24   Krystle MELONIE Sinha   levalbuterol (Xopenex HFA) 45 mcg/act inhaler Inhale 1-2 puffs every 4 (four) hours as needed for wheezing 10/10/24   Krystle MELONIE Sinha   levalbuterol (Xopenex) 0.63 mg/3 mL nebulizer solution Take 3 mL (0.63 mg total) by nebulization every 6 (six) hours as needed for wheezing or shortness of breath 3/28/24   KrystleMELONIE Dunn   metoprolol succinate (Toprol XL) 25 mg 24 hr tablet 25 mg 2/23/24   Historical Provider, MD   omeprazole (PriLOSEC) 20 mg delayed release capsule  5/31/24   Historical Provider, MD   phenazopyridine (PYRIDIUM) 200 mg tablet Take 1 tablet (200 mg total) by mouth 3 (three) times a day  Patient not taking: Reported on 10/10/2024 8/15/24   Albert Posada MD   pramipexole (MIRAPEX) 0.5 mg tablet Take 1 tablet by mouth 2 (two) times a day 8/21/23   Historical Provider, MD   rosuvastatin (CRESTOR) 5 mg tablet Take 5 mg by mouth daily 5/15/23   Historical Provider, MD   sertraline (ZOLOFT) 100 mg tablet Take 100 mg by mouth daily     Historical Provider, MD   telmisartan-hydrochlorothiazide (MICARDIS HCT) 80-25 MG per tablet  3/15/24   Historical Provider, MD Souleymane Palomaresta 200-62.5-25 MCG/ACT AEPB inhaler Inhale 1 puff daily 10/10/24   MELONIE Ortiz     Allergies   Allergen Reactions    Penicillins Hives    Morphine GI Intolerance     n, v       Objective :  Temp:  [97.8 °F (36.6 °C)] 97.8 °F (36.6 °C)  HR:  [67-90] 83  BP: (108-128)/(50-82) 127/72  Resp:  [17] 17  SpO2:  [91 %-95 %] 91 %  O2 Device: None (Room air)    Physical Exam  Vitals and nursing note reviewed.   Constitutional:       General: She is not in acute distress.     Appearance: She is well-developed.   HENT:      Head: Normocephalic and atraumatic.   Eyes:      Conjunctiva/sclera: Conjunctivae normal.   Cardiovascular:      Rate and Rhythm: Normal rate and regular rhythm.      Heart sounds: No murmur heard.  Pulmonary:      Effort: Pulmonary effort is normal. No respiratory distress.      Breath sounds: Wheezing and rales present.   Abdominal:      Palpations: Abdomen is soft.      Tenderness: There is no abdominal tenderness.   Musculoskeletal:         General: No swelling.      Cervical back: Neck supple.   Skin:     General: Skin is warm and dry.      Capillary Refill: Capillary refill takes less than 2 seconds.   Neurological:      Mental Status: She is alert and oriented to person, place, and time.   Psychiatric:         Mood and Affect: Mood normal.          Lines/Drains:            Lab Results: I have reviewed the following results:  Results from last 7 days   Lab Units 12/01/24  0936   WBC Thousand/uL 12.19*   HEMOGLOBIN g/dL 12.3   HEMATOCRIT % 38.3   PLATELETS Thousands/uL 198   SEGS PCT % 78*   LYMPHO PCT % 10*   MONO PCT % 9   EOS PCT % 1     Results from last 7 days   Lab Units 12/01/24  0936   SODIUM mmol/L 136   POTASSIUM mmol/L 4.1   CHLORIDE mmol/L 101   CO2 mmol/L 24   BUN mg/dL 16   CREATININE mg/dL 0.79   ANION GAP mmol/L 11   CALCIUM mg/dL 9.3  "  ALBUMIN g/dL 4.0   TOTAL BILIRUBIN mg/dL 1.05*   ALK PHOS U/L 91   ALT U/L 14   AST U/L 18   GLUCOSE RANDOM mg/dL 121     Results from last 7 days   Lab Units 12/01/24  1011   INR  1.08         No results found for: \"HGBA1C\"        Imaging Results Review: I reviewed radiology reports from this admission including: chest xray and CT chest.  Other Study Results Review: EKG was reviewed.     Administrative Statements   I have spent a total time of >45 minutes in caring for this patient on the day of the visit/encounter including Patient and family education, Documenting in the medical record, Reviewing / ordering tests, medicine, procedures  , Obtaining or reviewing history  , and Communicating with other healthcare professionals .    ** Please Note: This note has been constructed using a voice recognition system. **    "

## 2024-12-02 LAB
ANION GAP SERPL CALCULATED.3IONS-SCNC: 11 MMOL/L (ref 4–13)
ATRIAL RATE: 88 BPM
BASOPHILS # BLD AUTO: 0.02 THOUSANDS/ΜL (ref 0–0.1)
BASOPHILS NFR BLD AUTO: 0 % (ref 0–1)
BUN SERPL-MCNC: 21 MG/DL (ref 5–25)
CALCIUM SERPL-MCNC: 9.1 MG/DL (ref 8.4–10.2)
CHLORIDE SERPL-SCNC: 103 MMOL/L (ref 96–108)
CO2 SERPL-SCNC: 25 MMOL/L (ref 21–32)
CREAT SERPL-MCNC: 0.86 MG/DL (ref 0.6–1.3)
EOSINOPHIL # BLD AUTO: 0 THOUSAND/ΜL (ref 0–0.61)
EOSINOPHIL NFR BLD AUTO: 0 % (ref 0–6)
ERYTHROCYTE [DISTWIDTH] IN BLOOD BY AUTOMATED COUNT: 13.2 % (ref 11.6–15.1)
GFR SERPL CREATININE-BSD FRML MDRD: 67 ML/MIN/1.73SQ M
GLUCOSE SERPL-MCNC: 147 MG/DL (ref 65–140)
HCT VFR BLD AUTO: 37.7 % (ref 34.8–46.1)
HGB BLD-MCNC: 12 G/DL (ref 11.5–15.4)
IMM GRANULOCYTES # BLD AUTO: 0.04 THOUSAND/UL (ref 0–0.2)
IMM GRANULOCYTES NFR BLD AUTO: 1 % (ref 0–2)
LYMPHOCYTES # BLD AUTO: 0.9 THOUSANDS/ΜL (ref 0.6–4.47)
LYMPHOCYTES NFR BLD AUTO: 10 % (ref 14–44)
MCH RBC QN AUTO: 31.5 PG (ref 26.8–34.3)
MCHC RBC AUTO-ENTMCNC: 31.8 G/DL (ref 31.4–37.4)
MCV RBC AUTO: 99 FL (ref 82–98)
MONOCYTES # BLD AUTO: 0.51 THOUSAND/ΜL (ref 0.17–1.22)
MONOCYTES NFR BLD AUTO: 6 % (ref 4–12)
NEUTROPHILS # BLD AUTO: 7.39 THOUSANDS/ΜL (ref 1.85–7.62)
NEUTS SEG NFR BLD AUTO: 83 % (ref 43–75)
NRBC BLD AUTO-RTO: 0 /100 WBCS
P AXIS: 42 DEGREES
PLATELET # BLD AUTO: 228 THOUSANDS/UL (ref 149–390)
PMV BLD AUTO: 8.7 FL (ref 8.9–12.7)
POTASSIUM SERPL-SCNC: 3.5 MMOL/L (ref 3.5–5.3)
PR INTERVAL: 152 MS
PROCALCITONIN SERPL-MCNC: 0.47 NG/ML
QRS AXIS: 35 DEGREES
QRSD INTERVAL: 88 MS
QT INTERVAL: 358 MS
QTC INTERVAL: 433 MS
RBC # BLD AUTO: 3.81 MILLION/UL (ref 3.81–5.12)
SODIUM SERPL-SCNC: 139 MMOL/L (ref 135–147)
T WAVE AXIS: 41 DEGREES
VENTRICULAR RATE: 88 BPM
WBC # BLD AUTO: 8.86 THOUSAND/UL (ref 4.31–10.16)

## 2024-12-02 PROCEDURE — 93010 ELECTROCARDIOGRAM REPORT: CPT | Performed by: INTERNAL MEDICINE

## 2024-12-02 PROCEDURE — 94640 AIRWAY INHALATION TREATMENT: CPT

## 2024-12-02 PROCEDURE — 85025 COMPLETE CBC W/AUTO DIFF WBC: CPT | Performed by: FAMILY MEDICINE

## 2024-12-02 PROCEDURE — 80048 BASIC METABOLIC PNL TOTAL CA: CPT | Performed by: FAMILY MEDICINE

## 2024-12-02 PROCEDURE — 94760 N-INVAS EAR/PLS OXIMETRY 1: CPT

## 2024-12-02 PROCEDURE — 99232 SBSQ HOSP IP/OBS MODERATE 35: CPT | Performed by: INTERNAL MEDICINE

## 2024-12-02 PROCEDURE — 84145 PROCALCITONIN (PCT): CPT | Performed by: FAMILY MEDICINE

## 2024-12-02 PROCEDURE — 94660 CPAP INITIATION&MGMT: CPT

## 2024-12-02 PROCEDURE — 94664 DEMO&/EVAL PT USE INHALER: CPT

## 2024-12-02 RX ORDER — IPRATROPIUM BROMIDE AND ALBUTEROL SULFATE 2.5; .5 MG/3ML; MG/3ML
3 SOLUTION RESPIRATORY (INHALATION)
Status: DISCONTINUED | OUTPATIENT
Start: 2024-12-02 | End: 2024-12-03

## 2024-12-02 RX ORDER — GUAIFENESIN 600 MG/1
600 TABLET, EXTENDED RELEASE ORAL EVERY 12 HOURS SCHEDULED
Status: DISCONTINUED | OUTPATIENT
Start: 2024-12-02 | End: 2024-12-03 | Stop reason: HOSPADM

## 2024-12-02 RX ADMIN — PRAMIPEXOLE DIHYDROCHLORIDE 0.5 MG: 0.5 TABLET ORAL at 17:13

## 2024-12-02 RX ADMIN — CELECOXIB 200 MG: 200 CAPSULE ORAL at 08:10

## 2024-12-02 RX ADMIN — LOSARTAN POTASSIUM 100 MG: 50 TABLET, FILM COATED ORAL at 08:11

## 2024-12-02 RX ADMIN — NYSTATIN 500000 UNITS: 100000 SUSPENSION ORAL at 23:07

## 2024-12-02 RX ADMIN — GUAIFENESIN 600 MG: 600 TABLET ORAL at 13:16

## 2024-12-02 RX ADMIN — METHYLPREDNISOLONE SODIUM SUCCINATE 40 MG: 40 INJECTION, POWDER, FOR SOLUTION INTRAMUSCULAR; INTRAVENOUS at 06:07

## 2024-12-02 RX ADMIN — IPRATROPIUM BROMIDE 0.5 MG: 0.5 SOLUTION RESPIRATORY (INHALATION) at 08:16

## 2024-12-02 RX ADMIN — PRAVASTATIN SODIUM 40 MG: 40 TABLET ORAL at 17:13

## 2024-12-02 RX ADMIN — NYSTATIN 500000 UNITS: 100000 SUSPENSION ORAL at 08:10

## 2024-12-02 RX ADMIN — BUDESONIDE INHALATION 0.5 MG: 0.5 SUSPENSION RESPIRATORY (INHALATION) at 08:16

## 2024-12-02 RX ADMIN — AZITHROMYCIN MONOHYDRATE 500 MG: 500 INJECTION, POWDER, LYOPHILIZED, FOR SOLUTION INTRAVENOUS at 13:38

## 2024-12-02 RX ADMIN — HEPARIN SODIUM 5000 UNITS: 5000 INJECTION, SOLUTION INTRAVENOUS; SUBCUTANEOUS at 13:16

## 2024-12-02 RX ADMIN — PRAMIPEXOLE DIHYDROCHLORIDE 0.5 MG: 0.5 TABLET ORAL at 08:11

## 2024-12-02 RX ADMIN — NICOTINE 21 MG: 21 PATCH, EXTENDED RELEASE TRANSDERMAL at 08:14

## 2024-12-02 RX ADMIN — NYSTATIN 500000 UNITS: 100000 SUSPENSION ORAL at 17:13

## 2024-12-02 RX ADMIN — CEFTRIAXONE 1000 MG: 1 INJECTION, SOLUTION INTRAVENOUS at 12:00

## 2024-12-02 RX ADMIN — METHYLPREDNISOLONE SODIUM SUCCINATE 40 MG: 40 INJECTION, POWDER, FOR SOLUTION INTRAMUSCULAR; INTRAVENOUS at 14:14

## 2024-12-02 RX ADMIN — HEPARIN SODIUM 5000 UNITS: 5000 INJECTION, SOLUTION INTRAVENOUS; SUBCUTANEOUS at 06:07

## 2024-12-02 RX ADMIN — NYSTATIN 500000 UNITS: 100000 SUSPENSION ORAL at 11:59

## 2024-12-02 RX ADMIN — METOPROLOL SUCCINATE 25 MG: 25 TABLET, EXTENDED RELEASE ORAL at 08:11

## 2024-12-02 RX ADMIN — BUDESONIDE INHALATION 0.5 MG: 0.5 SUSPENSION RESPIRATORY (INHALATION) at 19:44

## 2024-12-02 RX ADMIN — HYDROCHLOROTHIAZIDE 25 MG: 25 TABLET ORAL at 08:11

## 2024-12-02 RX ADMIN — IPRATROPIUM BROMIDE AND ALBUTEROL SULFATE 3 ML: .5; 3 SOLUTION RESPIRATORY (INHALATION) at 13:42

## 2024-12-02 RX ADMIN — HEPARIN SODIUM 5000 UNITS: 5000 INJECTION, SOLUTION INTRAVENOUS; SUBCUTANEOUS at 23:07

## 2024-12-02 RX ADMIN — PANTOPRAZOLE SODIUM 40 MG: 40 TABLET, DELAYED RELEASE ORAL at 06:07

## 2024-12-02 RX ADMIN — ALBUTEROL SULFATE 2.5 MG: 2.5 SOLUTION RESPIRATORY (INHALATION) at 08:16

## 2024-12-02 RX ADMIN — SERTRALINE 100 MG: 100 TABLET, FILM COATED ORAL at 08:10

## 2024-12-02 RX ADMIN — ACETAMINOPHEN 325MG 650 MG: 325 TABLET ORAL at 02:10

## 2024-12-02 RX ADMIN — METHYLPREDNISOLONE SODIUM SUCCINATE 40 MG: 40 INJECTION, POWDER, FOR SOLUTION INTRAMUSCULAR; INTRAVENOUS at 23:07

## 2024-12-02 RX ADMIN — IPRATROPIUM BROMIDE AND ALBUTEROL SULFATE 3 ML: .5; 3 SOLUTION RESPIRATORY (INHALATION) at 19:44

## 2024-12-02 RX ADMIN — GUAIFENESIN 600 MG: 600 TABLET ORAL at 23:07

## 2024-12-02 NOTE — PROGRESS NOTES
Progress Note - Hospitalist   Name: Moriah Phillips 74 y.o. female I MRN: [id number]  Unit/Bed#: -01 I Date of Admission: 12/1/2024   Date of Service: 12/2/2024 I Hospital Day: 1    Assessment & Plan  CAP (community acquired pneumonia)  Evident on ct  Wbc elevated  Pro-Fadi elevated at 0.77 downtrending to 0.47.  Continue with IV Rocephin/zithromax  Covid flu neg  Check urine legionella /urine strep  Follow-up sputum culture     Hypertension  Micardis and toprol xl  COPD exacerbation (HCC)  Solumedrol 40 mg iv q8 likely can taper 2-12 hourly starting tomorrow.  Pulmicort bid  Abx for pneumonia  Albuterol/ipratropium  Acute on chronic respiratory failure with hypoxia (HCC)  Uses 2 liters of oxygen with cpap at night  81 % on RA ambulatory now on oxygen   Due to pna and copd exacerbation  Tobacco abuse  1 ppd - nrt ordered   Counseled on smoking cessation  ZINA (obstructive sleep apnea)  On cpap     VTE Pharmacologic Prophylaxis: VTE Score: 3 High Risk (Score >/= 5) - Pharmacological DVT Prophylaxis Ordered: enoxaparin (Lovenox). Sequential Compression Devices Ordered.    Mobility:   Basic Mobility Inpatient Raw Score: 24  JH-HLM Goal: 8: Walk 250 feet or more  JH-HLM Achieved: 7: Walk 25 feet or more  JH-HLM Goal achieved. Continue to encourage appropriate mobility.    Patient Centered Rounds: I performed bedside rounds with nursing staff today.   Discussions with Specialists or Other Care Team Provider: Discussed with nursing    Education and Discussions with Family / Patient: Updated  (daughter) at bedside.    Current Length of Stay: 1 day(s)  Current Patient Status: Inpatient   Certification Statement: The patient will continue to require additional inpatient hospital stay due to ongoing IV antibiotics and steroids  Discharge Plan: Anticipate discharge in 24-48 hrs to home.    Code Status: Level 1 - Full Code    Subjective   Seen and examined at bedside.  Continues to complain of dry cough.   Remains afebrile.  Currently on 2 L supplemental oxygen via nasal cannula.    Objective :  Temp:  [96.4 °F (35.8 °C)-97.5 °F (36.4 °C)] 96.4 °F (35.8 °C)  HR:  [65-93] 93  BP: (108-141)/(50-74) 141/74  Resp:  [17-20] 20  SpO2:  [91 %-98 %] 96 %  O2 Device: Nasal cannula  Nasal Cannula O2 Flow Rate (L/min):  [3 L/min] 3 L/min    Body mass index is 34.84 kg/m².     Input and Output Summary (last 24 hours):     Intake/Output Summary (Last 24 hours) at 12/2/2024 1211  Last data filed at 12/2/2024 0857  Gross per 24 hour   Intake 230 ml   Output --   Net 230 ml       Physical Exam  HENT:      Head: Normocephalic and atraumatic.      Mouth/Throat:      Mouth: Mucous membranes are moist.   Eyes:      Pupils: Pupils are equal, round, and reactive to light.   Cardiovascular:      Rate and Rhythm: Normal rate and regular rhythm.   Pulmonary:      Comments: Diffuse inspiratory and expiratory wheezing with diminished breath sounds in bilateral lung fields.  Abdominal:      General: Abdomen is flat. Bowel sounds are normal.      Palpations: Abdomen is soft.   Musculoskeletal:      Cervical back: Neck supple.      Right lower leg: No edema.      Left lower leg: No edema.   Neurological:      General: No focal deficit present.      Mental Status: She is alert and oriented to person, place, and time. Mental status is at baseline.           Lines/Drains:              Lab Results: I have reviewed the following results:   Results from last 7 days   Lab Units 12/02/24  0612   WBC Thousand/uL 8.86   HEMOGLOBIN g/dL 12.0   HEMATOCRIT % 37.7   PLATELETS Thousands/uL 228   SEGS PCT % 83*   LYMPHO PCT % 10*   MONO PCT % 6   EOS PCT % 0     Results from last 7 days   Lab Units 12/02/24  0612 12/01/24  0936   SODIUM mmol/L 139 136   POTASSIUM mmol/L 3.5 4.1   CHLORIDE mmol/L 103 101   CO2 mmol/L 25 24   BUN mg/dL 21 16   CREATININE mg/dL 0.86 0.79   ANION GAP mmol/L 11 11   CALCIUM mg/dL 9.1 9.3   ALBUMIN g/dL  --  4.0   TOTAL BILIRUBIN mg/dL   --  1.05*   ALK PHOS U/L  --  91   ALT U/L  --  14   AST U/L  --  18   GLUCOSE RANDOM mg/dL 147* 121     Results from last 7 days   Lab Units 12/01/24  1011   INR  1.08             Results from last 7 days   Lab Units 12/02/24  0612 12/01/24  1341   PROCALCITONIN ng/ml 0.47* 0.77*       Recent Cultures (last 7 days):   Results from last 7 days   Lab Units 12/01/24  1341   BLOOD CULTURE  Received in Microbiology Lab. Culture in Progress.  Received in Microbiology Lab. Culture in Progress.             Last 24 Hours Medication List:     Current Facility-Administered Medications:     acetaminophen (TYLENOL) tablet 650 mg, Q6H PRN    albuterol inhalation solution 2.5 mg, TID    azithromycin (ZITHROMAX) 500 mg in sodium chloride 0.9 % 250 mL IVPB, Q24H    benzonatate (TESSALON PERLES) capsule 100 mg, TID PRN    budesonide (PULMICORT) inhalation solution 0.5 mg, Q12H    cefTRIAXone (ROCEPHIN) IVPB (premix in dextrose) 1,000 mg 50 mL, Q24H, Last Rate: 1,000 mg (12/02/24 1200)    celecoxib (CeleBREX) capsule 200 mg, Daily    heparin (porcine) subcutaneous injection 5,000 Units, Q8H CATE **AND** [CANCELED] Platelet count, Once    hydroCHLOROthiazide tablet 25 mg, Daily    ipratropium (ATROVENT) 0.02 % inhalation solution 0.5 mg, TID    losartan (COZAAR) tablet 100 mg, Daily    methylPREDNISolone sodium succinate (Solu-MEDROL) injection 40 mg, Q8H    metoprolol succinate (TOPROL-XL) 24 hr tablet 25 mg, Daily    nicotine (NICODERM CQ) 21 mg/24 hr TD 24 hr patch 21 mg, Daily    nystatin (MYCOSTATIN) oral suspension 500,000 Units, 4x Daily    pantoprazole (PROTONIX) EC tablet 40 mg, Early Morning    pramipexole (MIRAPEX) tablet 0.5 mg, BID    pravastatin (PRAVACHOL) tablet 40 mg, Daily With Dinner    sertraline (ZOLOFT) tablet 100 mg, Daily    Administrative Statements   Today, Patient Was Seen By: Ofelia Moreno MD      **Please Note: This note may have been constructed using a voice recognition system.**

## 2024-12-02 NOTE — ASSESSMENT & PLAN NOTE
Solumedrol 40 mg iv q8 likely can taper 2-12 hourly starting tomorrow.  Pulmicort bid  Abx for pneumonia  Albuterol/ipratropium

## 2024-12-02 NOTE — CASE MANAGEMENT
Case Management Assessment & Discharge Planning Note    Patient name Moriah Phillips  Location /-01 MRN [id number]  : 1950 Date 2024       Current Admission Date: 2024  Current Admission Diagnosis:CAP (community acquired pneumonia)   Patient Active Problem List    Diagnosis Date Noted Date Diagnosed    CAP (community acquired pneumonia) 2024     COPD exacerbation ([facility]) 2024     Acute on chronic respiratory failure with hypoxia ([facility]) 2024     Tobacco abuse 2024     ZINA (obstructive sleep apnea) 2024     ILD (interstitial lung disease) ([facility]) 10/10/2024     Nicotine dependence, cigarettes, in remission 2024     Chronic hypoxic respiratory failure ([facility]) 2024     Abnormal CT of the chest 2024     Severe obesity ([facility]) 2024     History of recurrent UTIs 2024     PRESTON (acute kidney injury) ([facility]) 2024     GERD (gastroesophageal reflux disease) 2023     Hypertension 2023     Hyperlipidemia 2023     CPAP (continuous positive airway pressure) dependence 2023     History of bladder cancer 10/25/2023     Urinary urgency 10/25/2023     Dysuria 10/25/2023     Malignant neoplasm of urinary bladder, unspecified site ([facility]) 10/25/2023     Arthritis 2016     RLS (restless legs syndrome) 2016     Depression 2016     COPD (chronic obstructive pulmonary disease) ([facility])        LOS (days): 1  Geometric Mean LOS (GMLOS) (days): 4  Days to GMLOS:2.9     OBJECTIVE:    Risk of Unplanned Readmission Score: 11.64         Current admission status: Inpatient       Preferred Pharmacy:   [facility] [address] [address]  Phone: [phone] Fax: [phone]    [facility]/pharmacy #[id number] - [place], [place] - [address] [place] [id number]  Phone: [phone] Fax: [phone]    Primary Care Provider: Devin Singh  DO    Primary Insurance: MEDICARE  Secondary Insurance:  FOR LIFE    ASSESSMENT:  Active Health Care Proxies       Devin Phillips Health Care Representative - Spouse   Primary Phone: [phone] (Mobile)                 Advance Directives  Does patient have a Health Care POA?: Yes (, Kinjal)  Does patient have Advance Directives?: Yes  Advance Directives: Power of  for health care, Power of  for finance  Primary Contact: sharri phipps              Patient Information  Admitted from:: Home  Mental Status: Alert  During Assessment patient was accompanied by: Daughter  Assessment information provided by:: Patient  Primary Caregiver: Self  Support Systems: Spouse/significant other, Daughter, Family members  County of Residence: [facility]  Home entry access options. Select all that apply.: Stairs  Number of steps to enter home.: 6  Type of Current Residence: Bi-level  Upon entering residence, is there a bedroom on the main floor (no further steps)?: No  A bedroom is located on the following floor levels of residence (select all that apply):: 2nd Floor  Upon entering residence, is there a bathroom on the main floor (no further steps)?: No  Indicate which floors of current residence have a bathroom (select all the apply):: Basement  Number of steps to basement from main floor: 6  Number of steps to 2nd floor from main floor: 6  Living Arrangements: Lives w/ Spouse/significant other    Activities of Daily Living Prior to Admission  Functional Status: Independent  Completes ADLs independently?: Yes  Ambulates independently?: Yes  Does patient use assisted devices?: No  Does patient currently own DME?: Yes  What DME does the patient currently own?: Straight Cane, Walker, Electric Wheelchair  Does patient have a history of Outpatient Therapy (PT/OT)?: No  Does the patient have a history of Short-Term Rehab?: No  Does patient have a history of HHC?: No  Does patient currently have HHC?: No      "    Patient Information Continued  Income Source: Pension/senior living  Does patient have prescription coverage?: Yes  Does patient have a history of substance abuse?: No  Does patient have a history of Mental Health Diagnosis?: No         Means of Transportation  Means of Transport to Appts:: Drives Self          DISCHARGE DETAILS:    Discharge planning discussed with:: pt and pts daughter, who is at bedside  [place] of Choice: Yes  Comments - Freedom of Choice: pt wishes to return home  CM contacted family/caregiver?: Yes  Were Treatment Team discharge recommendations reviewed with patient/caregiver?: Yes  Did patient/caregiver verbalize understanding of patient care needs?: Yes  Were patient/caregiver advised of the risks associated with not following Treatment Team discharge recommendations?: Yes    Contacts  Patient Contacts: daughter, Odalis, , michael  Relationship to Patient:: Family  Contact Method: Phone  Reason/Outcome: Discharge Planning    Requested Home Health Care         Is the patient interested in HHC at discharge?: No    DME Referral Provided  Referral made for DME?: No         Would you like to participate in our [facility] service program?  : No - Declined                 Pt is from home, lives with  in a bilevel home with 6 HIPOLITO.  Pts bed/bath on second floor, 6 steps.  PT IPTA, no AD. Pt drives  Pt has home O2, through [employer].  Pt sts she uses 2L HS with her CPAP.  As per prior notes, pt is to be using O2 2L at rest and 4L with exertion.  Pt sts she DOES NOT wear O2 because \"the concentrator I have is too noisy.\"  CM educating pt on the importance of O2 compliance, pt sts \"I know, but its just so annoying to take everywhere.\".  CM requesting pt have her  bring portable oxygen to the hospital to have RT or rep from [employer] take a look at it, pt is agreeable.  Pt sts her  will bring in O2 unit this evening.  Cm reaching out to [employer] liaison, Silvina, who sts she " will look into pts product and have rep examine product if neccessary.

## 2024-12-02 NOTE — RESPIRATORY THERAPY NOTE
RT Protocol Note  Moriah Phillips 74 y.o. female MRN: [id number]  Unit/Bed#: -01 Encounter: [id number]    Assessment    Principal Problem:    CAP (community acquired pneumonia)  Active Problems:    Hypertension    COPD exacerbation (HCC)    Acute on chronic respiratory failure with hypoxia (HCC)    Tobacco abuse    ZINA (obstructive sleep apnea)      Home Pulmonary Medications:  Atrovent, Xopenex, Trelegy   Home Devices/Therapy: BiPAP/CPAP 3L O2 for HS CPAP    Past Medical History:   Diagnosis Date    Acute UTI 2014    Arthritis 2016    Bladder cancer (HCC)     COPD (chronic obstructive pulmonary disease) (HCC) 2016    CPAP (continuous positive airway pressure) dependence     Dependence on nicotine from cigarettes 2016    Depression 2016    Dupuytren's disease of palm 2018    Dyspnea on exertion     GERD (gastroesophageal reflux disease)     Heart murmur     Hyperlipidemia     Hypertension     Hypokalemia 2016    Left ventricular hypertrophy     Mixed stress and urge urinary incontinence     Non-rheumatic mitral regurgitation 2023    Osteoarthritis 2016    left knee    Osteopenia     Panlobular emphysema (HCC)     Recurrent UTI     RLS (restless legs syndrome) 2016    Sleep apnea     Sleep apnea 2023     Social History     Socioeconomic History    Marital status: /Civil Union     Spouse name: None    Number of children: None    Years of education: None    Highest education level: None   Occupational History    None   Tobacco Use    Smoking status: Former     Current packs/day: 1.00     Average packs/day: 1 pack/day for 50.0 years (50.0 ttl pk-yrs)     Types: Cigarettes    Smokeless tobacco: Never    Tobacco comments:     Has not smoked since hospital   Vaping Use    Vaping status: Never Used   Substance and Sexual Activity    Alcohol use: Yes     Alcohol/week: 2.0 standard drinks of alcohol     Types: 2 Standard drinks or equivalent per week     Comment: Occasionally    Drug use: Not Currently    Sexual  "activity: Not Currently   Other Topics Concern    None   Social History Narrative    None     Social Drivers of Health     Financial Resource Strain: Not on file   Food Insecurity: No Food Insecurity (2024)    Nursing - Inadequate Food Risk Classification     Worried About Running Out of Food in the Last Year: Never true     Ran Out of Food in the Last Year: Never true     Ran Out of Food in the Last Year: 1   Transportation Needs: No Transportation Needs (2024)    Nursing - Transportation Risk Classification     Lack of Transportation: Not on file     Lack of Transportation: 2   Physical Activity: Not on file   Stress: Not on file   Social Connections: Not on file   Intimate Partner Violence: Patient Unable To Answer (2024)    Nursing IPS     Feels Physically and Emotionally Safe: Not on file     Physically Hurt by Someone: Not on file     Humiliated or Emotionally Abused by Someone: Not on file     Physically Hurt by Someone: 97     Hurt or Threatened by Someone: 97   Housing Stability: Unknown (2024)    Nursing: Inadequate Housing Risk Classification     Has Housing: Not on file     Worried About Losing Housing: Not on file     Unable to Get Utilities: Not on file     Unable to Pay for Housing in the Last Year: 2     Has Housin       Subjective         Objective    Physical Exam:   Assessment Type: During-treatment  General Appearance: Alert, Awake  Respiratory Pattern: Dyspnea with exertion  Chest Assessment: Chest expansion symmetrical  Bilateral Breath Sounds: Diminished  Cough: Non-productive  O2 Device: 3L NC    Vitals:  Blood pressure 141/74, pulse 93, temperature (!) 96.4 °F (35.8 °C), resp. rate 20, height 5' 4\" (1.626 m), weight 92.1 kg (203 lb), SpO2 96%.          Imaging and other studies:     O2 Device: 3L NC     Plan    Respiratory Plan: Home Bronchodilator Patient pathway        Resp Comments: Pt on 3L NC, stated she has a productive cough and was awake most of the night with " then same.

## 2024-12-02 NOTE — RESPIRATORY THERAPY NOTE
RT Protocol Note  Moriah Phillips 74 y.o. female MRN: [id number]  Unit/Bed#: -01 Encounter: [id number]    Assessment    Principal Problem:    CAP (community acquired pneumonia)  Active Problems:    Hypertension    COPD exacerbation (HCC)    Acute on chronic respiratory failure with hypoxia (HCC)    Tobacco abuse    ZINA (obstructive sleep apnea)      Home Pulmonary Medications:    Home Devices/Therapy: BiPAP/CPAP    Past Medical History:   Diagnosis Date    Acute UTI 2014    Arthritis 2016    Bladder cancer (HCC)     COPD (chronic obstructive pulmonary disease) (HCC) 2016    CPAP (continuous positive airway pressure) dependence     Dependence on nicotine from cigarettes 2016    Depression 2016    Dupuytren's disease of palm 2018    Dyspnea on exertion     GERD (gastroesophageal reflux disease)     Heart murmur     Hyperlipidemia     Hypertension     Hypokalemia 2016    Left ventricular hypertrophy     Mixed stress and urge urinary incontinence     Non-rheumatic mitral regurgitation 2023    Osteoarthritis 2016    left knee    Osteopenia     Panlobular emphysema (HCC)     Recurrent UTI     RLS (restless legs syndrome) 2016    Sleep apnea     Sleep apnea 2023     Social History     Socioeconomic History    Marital status: /Civil Union     Spouse name: None    Number of children: None    Years of education: None    Highest education level: None   Occupational History    None   Tobacco Use    Smoking status: Former     Current packs/day: 1.00     Average packs/day: 1 pack/day for 50.0 years (50.0 ttl pk-yrs)     Types: Cigarettes    Smokeless tobacco: Never    Tobacco comments:     Has not smoked since hospital   Vaping Use    Vaping status: Never Used   Substance and Sexual Activity    Alcohol use: Yes     Alcohol/week: 2.0 standard drinks of alcohol     Types: 2 Standard drinks or equivalent per week     Comment: Occasionally    Drug use: Not Currently    Sexual activity: Not Currently   Other Topics  "Concern    None   Social History Narrative    None     Social Drivers of Health     Financial Resource Strain: Not on file   Food Insecurity: No Food Insecurity (2024)    Nursing - Inadequate Food Risk Classification     Worried About Running Out of Food in the Last Year: Never true     Ran Out of Food in the Last Year: Never true     Ran Out of Food in the Last Year: 1   Transportation Needs: No Transportation Needs (2024)    Nursing - Transportation Risk Classification     Lack of Transportation: Not on file     Lack of Transportation: 2   Physical Activity: Not on file   Stress: Not on file   Social Connections: Not on file   Intimate Partner Violence: Patient Unable To Answer (2024)    Nursing IPS     Feels Physically and Emotionally Safe: Not on file     Physically Hurt by Someone: Not on file     Humiliated or Emotionally Abused by Someone: Not on file     Physically Hurt by Someone: 97     Hurt or Threatened by Someone: 97   Housing Stability: Unknown (2024)    Nursing: Inadequate Housing Risk Classification     Has Housing: Not on file     Worried About Losing Housing: Not on file     Unable to Get Utilities: Not on file     Unable to Pay for Housing in the Last Year: 2     Has Housin       Subjective         Objective    Physical Exam:   Assessment Type: During-treatment  General Appearance: Awake, Alert  Respiratory Pattern: Dyspnea with exertion  Chest Assessment: Chest expansion symmetrical  Bilateral Breath Sounds: Diminished  Cough: Non-productive  O2 Device: 3 Lnc    Vitals:  Blood pressure 123/65, pulse 74, temperature 97.5 °F (36.4 °C), temperature source Temporal, resp. rate 18, height 5' 4\" (1.626 m), weight 92.1 kg (203 lb), SpO2 96%.          Imaging and other studies: Results Review Statement: No pertinent imaging studies reviewed.    O2 Device: 3 Lnc     Plan    Respiratory Plan: Home Bronchodilator Patient pathway        Resp Comments: Pt resting comfortably with home " cpap machine and mask, 4lpm of 02 titrated into system as requested by pt.

## 2024-12-02 NOTE — PLAN OF CARE
Problem: PAIN - ADULT  Goal: Verbalizes/displays adequate comfort level or baseline comfort level  Description: Interventions:  - Encourage patient to monitor pain and request assistance  - Assess pain using appropriate pain scale  - Administer analgesics based on type and severity of pain and evaluate response  - Implement non-pharmacological measures as appropriate and evaluate response  - Consider cultural and social influences on pain and pain management  - Notify physician/advanced practitioner if interventions unsuccessful or patient reports new pain  Outcome: Progressing     Problem: INFECTION - ADULT  Goal: Absence or prevention of progression during hospitalization  Description: INTERVENTIONS:  - Assess and monitor for signs and symptoms of infection  - Monitor lab/diagnostic results  - Monitor all insertion sites, i.e. indwelling lines, tubes, and drains  - Monitor endotracheal if appropriate and nasal secretions for changes in amount and color  - Joseph appropriate cooling/warming therapies per order  - Administer medications as ordered  - Instruct and encourage patient and family to use good hand hygiene technique  - Identify and instruct in appropriate isolation precautions for identified infection/condition  Outcome: Progressing     Problem: SAFETY ADULT  Goal: Patient will remain free of falls  Description: INTERVENTIONS:  - Educate patient/family on patient safety including physical limitations  - Instruct patient to call for assistance with activity   - Consult OT/PT to assist with strengthening/mobility   - Keep Call bell within reach  - Keep bed low and locked with side rails adjusted as appropriate  - Keep care items and personal belongings within reach  - Initiate and maintain comfort rounds  - Make Fall Risk Sign visible to staff  - Offer Toileting every 2 Hours, in advance of need  Outcome: Progressing     Problem: DISCHARGE PLANNING  Goal: Discharge to home or other facility with  appropriate resources  Description: INTERVENTIONS:  - Identify barriers to discharge w/patient and caregiver  - Arrange for needed discharge resources and transportation as appropriate  - Identify discharge learning needs (meds, wound care, etc.)  - Arrange for interpretive services to assist at discharge as needed  - Refer to Case Management Department for coordinating discharge planning if the patient needs post-hospital services based on physician/advanced practitioner order or complex needs related to functional status, cognitive ability, or social support system  Outcome: Progressing     Problem: Knowledge Deficit  Goal: Patient/family/caregiver demonstrates understanding of disease process, treatment plan, medications, and discharge instructions  Description: Complete learning assessment and assess knowledge base.  Interventions:  - Provide teaching at level of understanding  - Provide teaching via preferred learning methods  Outcome: Progressing     Problem: RESPIRATORY - ADULT  Goal: Achieves optimal ventilation and oxygenation  Description: INTERVENTIONS:  - Assess for changes in respiratory status  - Assess for changes in mentation and behavior  - Position to facilitate oxygenation and minimize respiratory effort  - Oxygen administered by appropriate delivery if ordered  - Initiate smoking cessation education as indicated  - Encourage broncho-pulmonary hygiene including cough, deep breathe, Incentive Spirometry  - Assess the need for suctioning and aspirate as needed  - Assess and instruct to report SOB or any respiratory difficulty  - Respiratory Therapy support as indicated  Outcome: Progressing

## 2024-12-02 NOTE — ASSESSMENT & PLAN NOTE
Evident on ct  Wbc elevated  Pro-Fadi elevated at 0.77 downtrending to 0.47.  Continue with IV Rocephin/zithromax  Covid flu neg  Check urine legionella /urine strep  Follow-up sputum culture

## 2024-12-03 VITALS
HEIGHT: 64 IN | SYSTOLIC BLOOD PRESSURE: 132 MMHG | RESPIRATION RATE: 18 BRPM | OXYGEN SATURATION: 97 % | DIASTOLIC BLOOD PRESSURE: 71 MMHG | HEART RATE: 66 BPM | WEIGHT: 203 LBS | TEMPERATURE: 97.2 F | BODY MASS INDEX: 34.66 KG/M2

## 2024-12-03 LAB
ANION GAP SERPL CALCULATED.3IONS-SCNC: 9 MMOL/L (ref 4–13)
BASOPHILS # BLD AUTO: 0.03 THOUSANDS/ΜL (ref 0–0.1)
BASOPHILS NFR BLD AUTO: 0 % (ref 0–1)
BUN SERPL-MCNC: 35 MG/DL (ref 5–25)
CALCIUM SERPL-MCNC: 9.2 MG/DL (ref 8.4–10.2)
CHLORIDE SERPL-SCNC: 103 MMOL/L (ref 96–108)
CO2 SERPL-SCNC: 26 MMOL/L (ref 21–32)
CREAT SERPL-MCNC: 0.93 MG/DL (ref 0.6–1.3)
EOSINOPHIL # BLD AUTO: 0 THOUSAND/ΜL (ref 0–0.61)
EOSINOPHIL NFR BLD AUTO: 0 % (ref 0–6)
ERYTHROCYTE [DISTWIDTH] IN BLOOD BY AUTOMATED COUNT: 13.3 % (ref 11.6–15.1)
GFR SERPL CREATININE-BSD FRML MDRD: 61 ML/MIN/1.73SQ M
GLUCOSE SERPL-MCNC: 136 MG/DL (ref 65–140)
HCT VFR BLD AUTO: 35.9 % (ref 34.8–46.1)
HGB BLD-MCNC: 11.7 G/DL (ref 11.5–15.4)
IMM GRANULOCYTES # BLD AUTO: 0.2 THOUSAND/UL (ref 0–0.2)
IMM GRANULOCYTES NFR BLD AUTO: 2 % (ref 0–2)
LYMPHOCYTES # BLD AUTO: 1.25 THOUSANDS/ΜL (ref 0.6–4.47)
LYMPHOCYTES NFR BLD AUTO: 11 % (ref 14–44)
MCH RBC QN AUTO: 31.5 PG (ref 26.8–34.3)
MCHC RBC AUTO-ENTMCNC: 32.6 G/DL (ref 31.4–37.4)
MCV RBC AUTO: 97 FL (ref 82–98)
MONOCYTES # BLD AUTO: 0.68 THOUSAND/ΜL (ref 0.17–1.22)
MONOCYTES NFR BLD AUTO: 6 % (ref 4–12)
NEUTROPHILS # BLD AUTO: 8.85 THOUSANDS/ΜL (ref 1.85–7.62)
NEUTS SEG NFR BLD AUTO: 81 % (ref 43–75)
NRBC BLD AUTO-RTO: 0 /100 WBCS
PLATELET # BLD AUTO: 253 THOUSANDS/UL (ref 149–390)
PMV BLD AUTO: 8.6 FL (ref 8.9–12.7)
POTASSIUM SERPL-SCNC: 3.7 MMOL/L (ref 3.5–5.3)
RBC # BLD AUTO: 3.71 MILLION/UL (ref 3.81–5.12)
SODIUM SERPL-SCNC: 138 MMOL/L (ref 135–147)
WBC # BLD AUTO: 11.01 THOUSAND/UL (ref 4.31–10.16)

## 2024-12-03 PROCEDURE — 94761 N-INVAS EAR/PLS OXIMETRY MLT: CPT

## 2024-12-03 PROCEDURE — 94664 DEMO&/EVAL PT USE INHALER: CPT

## 2024-12-03 PROCEDURE — 99239 HOSP IP/OBS DSCHRG MGMT >30: CPT | Performed by: INTERNAL MEDICINE

## 2024-12-03 PROCEDURE — 94640 AIRWAY INHALATION TREATMENT: CPT

## 2024-12-03 PROCEDURE — 85025 COMPLETE CBC W/AUTO DIFF WBC: CPT | Performed by: INTERNAL MEDICINE

## 2024-12-03 PROCEDURE — 94760 N-INVAS EAR/PLS OXIMETRY 1: CPT

## 2024-12-03 PROCEDURE — 80048 BASIC METABOLIC PNL TOTAL CA: CPT | Performed by: INTERNAL MEDICINE

## 2024-12-03 RX ORDER — METHYLPREDNISOLONE SODIUM SUCCINATE 40 MG/ML
40 INJECTION, POWDER, LYOPHILIZED, FOR SOLUTION INTRAMUSCULAR; INTRAVENOUS EVERY 12 HOURS SCHEDULED
Status: DISCONTINUED | OUTPATIENT
Start: 2024-12-03 | End: 2024-12-03 | Stop reason: HOSPADM

## 2024-12-03 RX ORDER — PREDNISONE 10 MG/1
TABLET ORAL
Qty: 30 TABLET | Refills: 0 | Status: SHIPPED | OUTPATIENT
Start: 2024-12-03 | End: 2024-12-15

## 2024-12-03 RX ORDER — NICOTINE 21 MG/24HR
1 PATCH, TRANSDERMAL 24 HOURS TRANSDERMAL DAILY
Qty: 28 PATCH | Refills: 0 | Status: SHIPPED | OUTPATIENT
Start: 2024-12-04

## 2024-12-03 RX ORDER — IPRATROPIUM BROMIDE AND ALBUTEROL SULFATE 2.5; .5 MG/3ML; MG/3ML
3 SOLUTION RESPIRATORY (INHALATION)
Status: DISCONTINUED | OUTPATIENT
Start: 2024-12-03 | End: 2024-12-03 | Stop reason: HOSPADM

## 2024-12-03 RX ORDER — LEVALBUTEROL INHALATION SOLUTION 0.63 MG/3ML
0.63 SOLUTION RESPIRATORY (INHALATION) EVERY 6 HOURS PRN
Qty: 75 ML | Refills: 0 | Status: SHIPPED | OUTPATIENT
Start: 2024-12-03

## 2024-12-03 RX ORDER — CEFDINIR 300 MG/1
300 CAPSULE ORAL EVERY 12 HOURS SCHEDULED
Qty: 10 CAPSULE | Refills: 0 | Status: SHIPPED | OUTPATIENT
Start: 2024-12-03 | End: 2024-12-08

## 2024-12-03 RX ORDER — GUAIFENESIN 600 MG/1
600 TABLET, EXTENDED RELEASE ORAL EVERY 12 HOURS SCHEDULED
Qty: 60 TABLET | Refills: 0 | Status: SHIPPED | OUTPATIENT
Start: 2024-12-03

## 2024-12-03 RX ADMIN — PANTOPRAZOLE SODIUM 40 MG: 40 TABLET, DELAYED RELEASE ORAL at 05:04

## 2024-12-03 RX ADMIN — METHYLPREDNISOLONE SODIUM SUCCINATE 40 MG: 40 INJECTION, POWDER, FOR SOLUTION INTRAMUSCULAR; INTRAVENOUS at 06:27

## 2024-12-03 RX ADMIN — HEPARIN SODIUM 5000 UNITS: 5000 INJECTION, SOLUTION INTRAVENOUS; SUBCUTANEOUS at 05:03

## 2024-12-03 RX ADMIN — CEFTRIAXONE 1000 MG: 1 INJECTION, SOLUTION INTRAVENOUS at 12:05

## 2024-12-03 RX ADMIN — CELECOXIB 200 MG: 200 CAPSULE ORAL at 08:42

## 2024-12-03 RX ADMIN — NICOTINE 21 MG: 21 PATCH, EXTENDED RELEASE TRANSDERMAL at 08:41

## 2024-12-03 RX ADMIN — PRAMIPEXOLE DIHYDROCHLORIDE 0.5 MG: 0.5 TABLET ORAL at 08:42

## 2024-12-03 RX ADMIN — IPRATROPIUM BROMIDE AND ALBUTEROL SULFATE 3 ML: .5; 3 SOLUTION RESPIRATORY (INHALATION) at 07:23

## 2024-12-03 RX ADMIN — GUAIFENESIN 600 MG: 600 TABLET ORAL at 08:43

## 2024-12-03 RX ADMIN — NYSTATIN 500000 UNITS: 100000 SUSPENSION ORAL at 08:41

## 2024-12-03 RX ADMIN — BUDESONIDE INHALATION 0.5 MG: 0.5 SUSPENSION RESPIRATORY (INHALATION) at 07:23

## 2024-12-03 RX ADMIN — SERTRALINE 100 MG: 100 TABLET, FILM COATED ORAL at 08:43

## 2024-12-03 RX ADMIN — HYDROCHLOROTHIAZIDE 25 MG: 25 TABLET ORAL at 08:42

## 2024-12-03 RX ADMIN — NYSTATIN 500000 UNITS: 100000 SUSPENSION ORAL at 12:05

## 2024-12-03 RX ADMIN — LOSARTAN POTASSIUM 100 MG: 50 TABLET, FILM COATED ORAL at 08:43

## 2024-12-03 RX ADMIN — METOPROLOL SUCCINATE 25 MG: 25 TABLET, EXTENDED RELEASE ORAL at 08:42

## 2024-12-03 NOTE — PLAN OF CARE
Problem: PAIN - ADULT  Goal: Verbalizes/displays adequate comfort level or baseline comfort level  Description: Interventions:  - Encourage patient to monitor pain and request assistance  - Assess pain using appropriate pain scale  - Administer analgesics based on type and severity of pain and evaluate response  - Implement non-pharmacological measures as appropriate and evaluate response  - Consider cultural and social influences on pain and pain management  - Notify physician/advanced practitioner if interventions unsuccessful or patient reports new pain  12/3/2024 1034 by La Roblero LPN  Outcome: Adequate for Discharge  12/3/2024 0740 by La Roblero LPN  Outcome: Progressing     Problem: INFECTION - ADULT  Goal: Absence or prevention of progression during hospitalization  Description: INTERVENTIONS:  - Assess and monitor for signs and symptoms of infection  - Monitor lab/diagnostic results  - Monitor all insertion sites, i.e. indwelling lines, tubes, and drains  - Monitor endotracheal if appropriate and nasal secretions for changes in amount and color  - Roy appropriate cooling/warming therapies per order  - Administer medications as ordered  - Instruct and encourage patient and family to use good hand hygiene technique  - Identify and instruct in appropriate isolation precautions for identified infection/condition  12/3/2024 1034 by La Roblero LPN  Outcome: Adequate for Discharge  12/3/2024 0740 by La Roblero LPN  Outcome: Progressing     Problem: SAFETY ADULT  Goal: Patient will remain free of falls  Description: INTERVENTIONS:  - Educate patient/family on patient safety including physical limitations  - Instruct patient to call for assistance with activity   - Consult OT/PT to assist with strengthening/mobility   - Keep Call bell within reach  - Keep bed low and locked with side rails adjusted as appropriate  - Keep care items and personal belongings within reach  - Initiate and  maintain comfort rounds  - Make Fall Risk Sign visible to staff  - Offer Toileting every 2 Hours, in advance of need  12/3/2024 1034 by La Roblero LPN  Outcome: Adequate for Discharge  12/3/2024 0740 by La Roblero LPN  Outcome: Progressing     Problem: DISCHARGE PLANNING  Goal: Discharge to home or other facility with appropriate resources  Description: INTERVENTIONS:  - Identify barriers to discharge w/patient and caregiver  - Arrange for needed discharge resources and transportation as appropriate  - Identify discharge learning needs (meds, wound care, etc.)  - Arrange for interpretive services to assist at discharge as needed  - Refer to Case Management Department for coordinating discharge planning if the patient needs post-hospital services based on physician/advanced practitioner order or complex needs related to functional status, cognitive ability, or social support system  12/3/2024 1034 by La Roblero LPN  Outcome: Adequate for Discharge  12/3/2024 0740 by La Roblero LPN  Outcome: Progressing     Problem: Knowledge Deficit  Goal: Patient/family/caregiver demonstrates understanding of disease process, treatment plan, medications, and discharge instructions  Description: Complete learning assessment and assess knowledge base.  Interventions:  - Provide teaching at level of understanding  - Provide teaching via preferred learning methods  12/3/2024 1034 by La Roblero LPN  Outcome: Adequate for Discharge  12/3/2024 0740 by La Roblero LPN  Outcome: Progressing     Problem: RESPIRATORY - ADULT  Goal: Achieves optimal ventilation and oxygenation  Description: INTERVENTIONS:  - Assess for changes in respiratory status  - Assess for changes in mentation and behavior  - Position to facilitate oxygenation and minimize respiratory effort  - Oxygen administered by appropriate delivery if ordered  - Initiate smoking cessation education as indicated  - Encourage broncho-pulmonary  hygiene including cough, deep breathe, Incentive Spirometry  - Assess the need for suctioning and aspirate as needed  - Assess and instruct to report SOB or any respiratory difficulty  - Respiratory Therapy support as indicated  12/3/2024 1034 by La Roblero LPN  Outcome: Adequate for Discharge  12/3/2024 0740 by La Roblero LPN  Outcome: Progressing

## 2024-12-03 NOTE — DISCHARGE INSTR - OTHER ORDERS
Your goal pulse oximetry is 88 - 92%.  If your pulse ox is <88 - rest for 5 minutes and take deep breaths through your nose with the oxygen in place.  Make sure your finger is warm (cold fingers will give falsely low readings).  After 5 minutes, recheck your pulse ox.  If your pulse ox continues to be below < 88% and you feel short of breath, rest, breathe through your nose and call your primary care physician or pulmonologist 24/7 (if after office hours the answering service will contact the on call physician who will call you back).  If you continue to feel excessively short of breath (much more than your normal breathing pattern), consider further evaluation in the emergency department - remember that a mask must be worn to enter any [facility] Emergency Department.  If your pulse ox continues to be below < 88% and you do not feel short of breath increase your oxygen flow by 1 liter at a time to achieve a reading between 88 and 92%.  If you are needing more than 2 liters higher than your normal flow for more than a few hours call your primary care physician or pulmonologist, even if you are not feeling short of breath.  If your pulse ox is >92% it is safe to turn down oxygen by 1 liter at a time to achieve a reading of 88-92%.  You are recommended 4L O2 on activity.

## 2024-12-03 NOTE — RESPIRATORY THERAPY NOTE
Home Oxygen Qualifying Test     Patient name: Moriah Phillips        : 1950   Date of Test:  December 3, 2024  Diagnosis:    Home Oxygen Test:    **Medicare Guidelines require item(s) 1-5 on all ambulatory patients or 1 and 2 on non-ambulatory patients.    1. Baseline SPO2 on Room Air at rest 90 %   If <= 88% on Room Air add O2 via NC to obtain SpO2 >=88%. If LPM needed, document LPM N/A needed to reach =>88%    SPO2 during exertion on Room Air 85  %  During exertion monitor SPO2. If SPO2 increases >=89%, do not add supplemental oxygen    SPO2 on Oxygen at Rest N/A% at N/A LPM    SPO2 during exertion on Oxygen 90 % at 4 LPM    Test performed during exertion activity.      [x]  Supplemental Home Oxygen is indicated.    []  Client does not qualify for home oxygen.    Respiratory Additional Notes- Pt required 4LPM NC with exertion only to maintain SpO2>88%.     Natty Ward, RT

## 2024-12-03 NOTE — DISCHARGE SUMMARY
Discharge Summary - Hospitalist   Name: Moriah Phillips 74 y.o. female I MRN: [id number]  Unit/Bed#: -01 I Date of Admission: 12/1/2024   Date of Service: 12/3/2024 I Hospital Day: 2     Assessment & Plan  CAP (community acquired pneumonia)  Evident on ct  Wbc elevated  Pro-Fadi elevated at 0.77 downtrending to 0.47.  Continue with IV Rocephin/zithromax  Covid flu neg  Blood cultures remain negative.  Transition to Omnicef to complete total 7 days of treatment.    Hypertension  Micardis and toprol xl  COPD exacerbation (HCC)  Solumedrol 40 mg iv q8   Transition to tapering prednisone, Trelegy, albuterol inhaler, every 6 hourly as needed nebulizer with levalbuterol and ipratropium on discharge.   Abx for pneumonia  Close outpatient pulmonology follow-up  Acute on chronic respiratory failure with hypoxia (HCC)  Uses 2 liters of oxygen with cpap at night  81 % on RA ambulatory now on oxygen   Due to pna and copd exacerbation  Home oxygen evaluation done upon discharge and needs 4 L with ambulation which will be set up by case management.  Tobacco abuse  1 ppd - nrt ordered   Counseled on smoking cessation  ZINA (obstructive sleep apnea)  On cpap      Medical Problems       Resolved Problems  Date Reviewed: 12/1/2024   None         MESSAGE TO PCP (Devin Singh DO) FOR FOLLOW UP:   Thank you for allowing us to participate in the care of your patient, Moriah Phillips, who was hospitalized from 12/1/2024 through 12/03/24 with the admitting diagnosis of pneumonia and COPD exacerbation.  Discharged on tapering prednisone and antibiotics to complete course of treatment with close outpatient pulmonology follow-up.  If you have any additional questions or would like to discuss further, please feel free to contact me.  Ofelia Moreno MD  [facility] Internal Medicine, Hospitalist, [phone]     Admission Date:   Admission Orders (From admission, onward)       Ordered        12/01/24 1331  INPATIENT ADMISSION  Once       "                    Discharge Date: 12/03/24    Consultations During Hospital Stay:  NA    Procedures Performed:   CT chest:New bilateral pulmonary infiltrates likely reflecting multifocal pneumonia.  Emphysema.  No pulmonary embolism identified.  No acute intra-abdominal pathology.  Hepatomegaly.    Significant Findings / Test Results:   NA    Incidental Findings:   NA       Test Results Pending at Discharge (will require follow up):   NA     Complications:  None     Reason for Admission: Shortness of breath     Hospital Course:   Moriah Phillips is a 74 y.o. female patient who originally presented to the hospital on 12/1/2024 due to shortness of breath and was diagnosed with pneumonia in the setting of COPD exacerbation.  Treated with IV antibiotics and IV steroids along with scheduled nebulization treatment and antitussive therapy.  Upon improvement in symptoms with transition to tapering oral prednisone and antibiotics to complete course of treatment.  Counseled regarding smoking cessation.  Home oxygen evaluation was done again prior to discharge and home oxygen was set up by case management.  Patient is being discharged  in a stable condition.  Recommended close outpatient follow-up with primary care physician within 1 week of discharge as well as with pulmonologist with in 2 to 3 weeks.          Please see above list of diagnoses and related plan for additional information.     Condition at Discharge: stable    Discharge Day Visit / Exam:   Subjective: Patient seen and examined at bedside.  Reports improvement in breathing.  Cough has remarkably improved.  Vitals: Blood Pressure: 132/71 (12/03/24 0701)  Pulse: 66 (12/03/24 0701)  Temperature: (!) 97.2 °F (36.2 °C) (12/03/24 0701)  Temp Source: Temporal (12/01/24 2206)  Respirations: 18 (12/03/24 0701)  Height: 5' 4\" (162.6 cm) (12/01/24 1435)  Weight - Scale: 92.1 kg (203 lb) (12/01/24 1435)  SpO2: 97 % (12/03/24 0701)  Physical Exam  Constitutional:       " General: She is not in acute distress.  HENT:      Head: Normocephalic.      Nose: Nose normal.      Mouth/Throat:      Mouth: Mucous membranes are moist.   Eyes:      Extraocular Movements: Extraocular movements intact.      Pupils: Pupils are equal, round, and reactive to light.   Cardiovascular:      Rate and Rhythm: Normal rate and regular rhythm.   Pulmonary:      Comments: Improved air entry bilateral lung fields with diminished wheezing.  No increased work of breathing or tachypnea.  Abdominal:      General: Abdomen is flat. Bowel sounds are normal.      Palpations: Abdomen is soft.   Musculoskeletal:      Right lower leg: No edema.      Left lower leg: No edema.   Skin:     General: Skin is warm.   Neurological:      General: No focal deficit present.      Mental Status: She is alert and oriented to person, place, and time. Mental status is at baseline.   Psychiatric:         Mood and Affect: Mood normal.          Discussion with Family: Updated  () via phone.    Discharge instructions/Information to patient and family:   See after visit summary for information provided to patient and family.      Provisions for Follow-Up Care:  See after visit summary for information related to follow-up care and any pertinent home health orders.      Mobility at time of Discharge:   Basic Mobility Inpatient Raw Score: 24  JH-HLM Goal: 8: Walk 250 feet or more  JH-HLM Achieved: 7: Walk 25 feet or more  HLM Goal achieved. Continue to encourage appropriate mobility.     Disposition:   Home    Planned Readmission: No    Discharge Medications:  See after visit summary for reconciled discharge medications provided to patient and/or family.      Administrative Statements   Discharge Statement:  I have spent a total time of >30 minutes in caring for this patient on the day of the visit/encounter. >30 minutes of time was spent on: Risks and benefits of tx options, Instructions for management, Patient and family  education, Importance of tx compliance, Risk factor reductions, Impressions, Counseling / Coordination of care, Documenting in the medical record, Reviewing / ordering tests, medicine, procedures  , and Communicating with other healthcare professionals .    **Please Note: This note may have been constructed using a voice recognition system**

## 2024-12-03 NOTE — ASSESSMENT & PLAN NOTE
Uses 2 liters of oxygen with cpap at night  81 % on RA ambulatory now on oxygen   Due to pna and copd exacerbation  Home oxygen evaluation done upon discharge and needs 4 L with ambulation which will be set up by case management.

## 2024-12-03 NOTE — CASE MANAGEMENT
Case Management Discharge Planning Note    Patient name Moriah Phillips  Location /-01 MRN [id number]  : 1950 Date 12/3/2024       Current Admission Date: 2024  Current Admission Diagnosis:CAP (community acquired pneumonia)   Patient Active Problem List    Diagnosis Date Noted Date Diagnosed    CAP (community acquired pneumonia) 2024     COPD exacerbation ([facility]) 2024     Acute on chronic respiratory failure with hypoxia ([facility]) 2024     Tobacco abuse 2024     ZINA (obstructive sleep apnea) 2024     ILD (interstitial lung disease) ([facility]) 10/10/2024     Nicotine dependence, cigarettes, in remission 2024     Chronic hypoxic respiratory failure ([facility]) 2024     Abnormal CT of the chest 2024     Severe obesity ([facility]) 2024     History of recurrent UTIs 2024     PRESTON (acute kidney injury) ([facility]) 2024     GERD (gastroesophageal reflux disease) 2023     Hypertension 2023     Hyperlipidemia 2023     CPAP (continuous positive airway pressure) dependence 2023     History of bladder cancer 10/25/2023     Urinary urgency 10/25/2023     Dysuria 10/25/2023     Malignant neoplasm of urinary bladder, unspecified site ([facility]) 10/25/2023     Arthritis 2016     RLS (restless legs syndrome) 2016     Depression 2016     COPD (chronic obstructive pulmonary disease) ([facility])        LOS (days): 2  Geometric Mean LOS (GMLOS) (days): 4  Days to GMLOS:2     OBJECTIVE:  Risk of Unplanned Readmission Score: 12.97         Current admission status: Inpatient   Preferred Pharmacy:   [facility] - [place], [place] - [address] [place] [id number]  Phone: [phone] Fax: [phone]    [id number] - [place], [place] - [address] [place] [id number]  Phone: [phone] Fax: [phone]    Primary Care Provider: Devin Singh DO    Primary Insurance:  MEDICARE  Secondary Insurance:  FOR LIFE    DISCHARGE DETAILS:     DME Referral Provided  Referral made for DME?: Yes  DME referral completed for the following items:: Home Oxygen concentrator, Portable Oxygen concentrator, Portable Oxygen tanks  D[facility] Supp[facility]    Other Referral/Resources/Interventions Provided:  Interventions: DME  Referral Comments: [facility]    Would you like to participate in our [facility] service program?  : No - Declined    Treatment Team Recommendation: Home  Discharge Destination Plan:: Home  Transport at Discharge : Family       CM informed by respiratory team of O2 requirements at discharge - 0 rest, 4L activity.     CM submitted updated referral to [employer] via [place] with O2 changes.     CM documented O2 requirements/[employer] contact information AVS and informed patient and spouse at bedside of this information.   Patient has current POC and is ready for discharge.   Patient declined any other CM needs at discharge    CM to follow patient's care and discharge needs.

## 2024-12-03 NOTE — PLAN OF CARE
Problem: PAIN - ADULT  Goal: Verbalizes/displays adequate comfort level or baseline comfort level  Description: Interventions:  - Encourage patient to monitor pain and request assistance  - Assess pain using appropriate pain scale  - Administer analgesics based on type and severity of pain and evaluate response  - Implement non-pharmacological measures as appropriate and evaluate response  - Consider cultural and social influences on pain and pain management  - Notify physician/advanced practitioner if interventions unsuccessful or patient reports new pain  Outcome: Progressing     Problem: INFECTION - ADULT  Goal: Absence or prevention of progression during hospitalization  Description: INTERVENTIONS:  - Assess and monitor for signs and symptoms of infection  - Monitor lab/diagnostic results  - Monitor all insertion sites, i.e. indwelling lines, tubes, and drains  - Monitor endotracheal if appropriate and nasal secretions for changes in amount and color  - [place] appropriate cooling/warming therapies per order  - Administer medications as ordered  - Instruct and encourage patient and family to use good hand hygiene technique  - Identify and instruct in appropriate isolation precautions for identified infection/condition  Outcome: Progressing     Problem: SAFETY ADULT  Goal: Patient will remain free of falls  Description: INTERVENTIONS:  - Educate patient/family on patient safety including physical limitations  - Instruct patient to call for assistance with activity   - Consult OT/PT to assist with strengthening/mobility   - Keep Call bell within reach  - Keep bed low and locked with side rails adjusted as appropriate  - Keep care items and personal belongings within reach  - Initiate and maintain comfort rounds  - Make Fall Risk Sign visible to staff  - Offer Toileting every 2 Hours, in advance of need  Outcome: Progressing     Problem: DISCHARGE PLANNING  Goal: Discharge to home or other facility with  appropriate resources  Description: INTERVENTIONS:  - Identify barriers to discharge w/patient and caregiver  - Arrange for needed discharge resources and transportation as appropriate  - Identify discharge learning needs (meds, wound care, etc.)  - Arrange for interpretive services to assist at discharge as needed  - Refer to Case Management Department for coordinating discharge planning if the patient needs post-hospital services based on physician/advanced practitioner order or complex needs related to functional status, cognitive ability, or social support system  Outcome: Progressing     Problem: Knowledge Deficit  Goal: Patient/family/caregiver demonstrates understanding of disease process, treatment plan, medications, and discharge instructions  Description: Complete learning assessment and assess knowledge base.  Interventions:  - Provide teaching at level of understanding  - Provide teaching via preferred learning methods  Outcome: Progressing     Problem: RESPIRATORY - ADULT  Goal: Achieves optimal ventilation and oxygenation  Description: INTERVENTIONS:  - Assess for changes in respiratory status  - Assess for changes in mentation and behavior  - Position to facilitate oxygenation and minimize respiratory effort  - Oxygen administered by appropriate delivery if ordered  - Initiate smoking cessation education as indicated  - Encourage broncho-pulmonary hygiene including cough, deep breathe, Incentive Spirometry  - Assess the need for suctioning and aspirate as needed  - Assess and instruct to report SOB or any respiratory difficulty  - Respiratory Therapy support as indicated  Outcome: Progressing

## 2024-12-03 NOTE — ASSESSMENT & PLAN NOTE
Evident on ct  Wbc elevated  Pro-Fadi elevated at 0.77 downtrending to 0.47.  Continue with IV Rocephin/zithromax  Covid flu neg  Blood cultures remain negative.  Transition to Omnicef to complete total 7 days of treatment.

## 2024-12-03 NOTE — PLAN OF CARE
Problem: PAIN - ADULT  Goal: Verbalizes/displays adequate comfort level or baseline comfort level  Description: Interventions:  - Encourage patient to monitor pain and request assistance  - Assess pain using appropriate pain scale  - Administer analgesics based on type and severity of pain and evaluate response  - Implement non-pharmacological measures as appropriate and evaluate response  - Consider cultural and social influences on pain and pain management  - Notify physician/advanced practitioner if interventions unsuccessful or patient reports new pain  Outcome: Progressing     Problem: INFECTION - ADULT  Goal: Absence or prevention of progression during hospitalization  Description: INTERVENTIONS:  - Assess and monitor for signs and symptoms of infection  - Monitor lab/diagnostic results  - Monitor all insertion sites, i.e. indwelling lines, tubes, and drains  - Monitor endotracheal if appropriate and nasal secretions for changes in amount and color  - [place] appropriate cooling/warming therapies per order  - Administer medications as ordered  - Instruct and encourage patient and family to use good hand hygiene technique  - Identify and instruct in appropriate isolation precautions for identified infection/condition  Outcome: Progressing     Problem: SAFETY ADULT  Goal: Patient will remain free of falls  Description: INTERVENTIONS:  - Educate patient/family on patient safety including physical limitations  - Instruct patient to call for assistance with activity   - Consult OT/PT to assist with strengthening/mobility   - Keep Call bell within reach  - Keep bed low and locked with side rails adjusted as appropriate  - Keep care items and personal belongings within reach  - Initiate and maintain comfort rounds  - Make Fall Risk Sign visible to staff  - Offer Toileting every 2 Hours, in advance of need  Outcome: Progressing     Problem: DISCHARGE PLANNING  Goal: Discharge to home or other facility with  appropriate resources  Description: INTERVENTIONS:  - Identify barriers to discharge w/patient and caregiver  - Arrange for needed discharge resources and transportation as appropriate  - Identify discharge learning needs (meds, wound care, etc.)  - Arrange for interpretive services to assist at discharge as needed  - Refer to Case Management Department for coordinating discharge planning if the patient needs post-hospital services based on physician/advanced practitioner order or complex needs related to functional status, cognitive ability, or social support system  Outcome: Progressing     Problem: Knowledge Deficit  Goal: Patient/family/caregiver demonstrates understanding of disease process, treatment plan, medications, and discharge instructions  Description: Complete learning assessment and assess knowledge base.  Interventions:  - Provide teaching at level of understanding  - Provide teaching via preferred learning methods  Outcome: Progressing     Problem: RESPIRATORY - ADULT  Goal: Achieves optimal ventilation and oxygenation  Description: INTERVENTIONS:  - Assess for changes in respiratory status  - Assess for changes in mentation and behavior  - Position to facilitate oxygenation and minimize respiratory effort  - Oxygen administered by appropriate delivery if ordered  - Initiate smoking cessation education as indicated  - Encourage broncho-pulmonary hygiene including cough, deep breathe, Incentive Spirometry  - Assess the need for suctioning and aspirate as needed  - Assess and instruct to report SOB or any respiratory difficulty  - Respiratory Therapy support as indicated  Outcome: Progressing

## 2024-12-03 NOTE — ASSESSMENT & PLAN NOTE
Solumedrol 40 mg iv q8   Transition to tapering prednisone, Trelegy, albuterol inhaler, every 6 hourly as needed nebulizer with levalbuterol and ipratropium on discharge.   Abx for pneumonia  Close outpatient pulmonology follow-up

## 2024-12-06 LAB
BACTERIA BLD CULT: NORMAL
BACTERIA BLD CULT: NORMAL

## 2024-12-10 LAB

## 2024-12-31 PROBLEM — J18.9 CAP (COMMUNITY ACQUIRED PNEUMONIA): Status: RESOLVED | Noted: 2024-12-01 | Resolved: 2024-12-31

## 2025-01-14 ENCOUNTER — TELEPHONE (OUTPATIENT)
Age: 75
End: 2025-01-14

## 2025-01-14 NOTE — TELEPHONE ENCOUNTER
Paco from [employer] called in to following up on medical request on the patient clinical note on 1/7/25, I advise Paco patient r.s her appt for 1/21/25. Paco was fax over another request for the up coming office visit note

## 2025-01-21 ENCOUNTER — OFFICE VISIT (OUTPATIENT)
Dept: PULMONOLOGY | Facility: CLINIC | Age: 75
End: 2025-01-21
Payer: MEDICARE

## 2025-01-21 ENCOUNTER — TELEPHONE (OUTPATIENT)
Dept: PULMONOLOGY | Facility: CLINIC | Age: 75
End: 2025-01-21

## 2025-01-21 VITALS
WEIGHT: 208 LBS | DIASTOLIC BLOOD PRESSURE: 74 MMHG | SYSTOLIC BLOOD PRESSURE: 120 MMHG | BODY MASS INDEX: 35.51 KG/M2 | TEMPERATURE: 97.2 F | HEART RATE: 75 BPM | HEIGHT: 64 IN | OXYGEN SATURATION: 96 %

## 2025-01-21 DIAGNOSIS — J18.9 COMMUNITY ACQUIRED PNEUMONIA OF LEFT LOWER LOBE OF LUNG: ICD-10-CM

## 2025-01-21 DIAGNOSIS — J44.9 CHRONIC OBSTRUCTIVE PULMONARY DISEASE, UNSPECIFIED COPD TYPE (HCC): ICD-10-CM

## 2025-01-21 DIAGNOSIS — J96.11 CHRONIC HYPOXIC RESPIRATORY FAILURE (HCC): ICD-10-CM

## 2025-01-21 DIAGNOSIS — J84.9 ILD (INTERSTITIAL LUNG DISEASE) (HCC): ICD-10-CM

## 2025-01-21 DIAGNOSIS — G47.33 OSA (OBSTRUCTIVE SLEEP APNEA): Primary | ICD-10-CM

## 2025-01-21 DIAGNOSIS — J44.1 COPD EXACERBATION (HCC): ICD-10-CM

## 2025-01-21 PROCEDURE — G2211 COMPLEX E/M VISIT ADD ON: HCPCS | Performed by: NURSE PRACTITIONER

## 2025-01-21 PROCEDURE — 99214 OFFICE O/P EST MOD 30 MIN: CPT | Performed by: NURSE PRACTITIONER

## 2025-01-21 NOTE — ASSESSMENT & PLAN NOTE
Compliance reviewed 10/3/24-12/31/24: She has used 89/90 days (98%)  Average nightly use 9h 56m (98%)  Residual AHI 3.1    She is fully compliant on PAP therapy. Continue to utilize for all hours of sleep

## 2025-01-21 NOTE — PROGRESS NOTES
Pulmonary Follow-Up Note   Moriah Phillips 74 y.o. female MRN: [id number]  1/21/2025      Assessment/Plan:    Problem List Items Addressed This Visit       COPD (chronic obstructive pulmonary disease) (HCC)    Mild obstruction on PFT with FEV1 70% predicted. Emphysema noted on radiographic studies.   Continue Trelegy daily  Nebs with Xopenex/ipratropium up to 4 times per day if needed         Chronic hypoxic respiratory failure (HCC)    Recent hospital discharge where 4L/m with ambulation was prescribed; she has weaned herself down to 3L/m.    Patient may opt to keep at home O2 at 3L/m during the day to help improve air flow - she has a long tube and 2L/m not strong enough to push to the end.    Continue 2L/m bled in to CPAP         ILD (interstitial lung disease) (HCC)    Most recent PFT results more consistent with obstruction rather than restriction.  She has atypical pANCA but no prior history of ulcerative colitis, sclerosing cholangitis or hepatitis. Remainder of labs are unremarkable.    More than likely the fibrosis seen in CT imaging is CPFE         COPD exacerbation (HCC)    ZINA (obstructive sleep apnea) - Primary    Compliance reviewed 10/3/24-12/31/24: She has used 89/90 days (98%)  Average nightly use 9h 56m (98%)  Residual AHI 3.1    She is fully compliant on PAP therapy. Continue to utilize for all hours of sleep            Other Visit Diagnoses         Community acquired pneumonia of left lower lobe of lung        Relevant Orders    CT chest without contrast          Vaccines: up to date    Return in about 3 months (around 4/21/2025).    All of Moriah's questions were answered prior to leaving the office today.  She is aware to call our office with any further questions or concerns.    History of Present Illness   Reason for Visit: HFU  Chief Complaint: sick  HPI: Moriah Phillips is a 74 y.o. female who presents to the office today following recent hospital visit 12/1/24-12/3/24 for COPD  exacerbation due to pneumonia. She had a total of 7 days antibiotic with Rocephin/azithromycin transitioned to omnicef at discharge. She did complete a prednisone taper. She was discharged home with 4L/m supplemental O2 with ambulation in addition to her usual 2L/m overnight. Patient in the past has declined to wear oxygen with ambulation.    Today she feels she is doing well. She denies cough or wheeze. Some SOB with exertion. No sleep disturbance due to pulmonary issues (does awaken to use the restroom). She has not needed the rescue inhaler.    Review of Systems  Please note that a 14-point review of systems was performed to include Constitutional, HEENT, Respiratory, CVS, GI, , Musculoskeletal, Integumentary, Neurologic, Rheumatologic, Endocrinologic, Psychiatric, Lymphatic, and Hematologic/Oncologic systems were reviewed and are negative unless otherwise stated in HPI. Positive and negative findings pertinent to this evaluation are incorporated into the history of present illness.       Historical Information   Past Medical History:   Diagnosis Date    Acute UTI 2014    Arthritis 2016    Bladder cancer ([facility])     COPD (chronic obstructive pulmonary disease) ([facility]) 2016    CPAP (continuous positive airway pressure) dependence     Dependence on nicotine from cigarettes 2016    Depression 2016    Dupuytren's disease of palm 2018    Dyspnea on exertion     GERD (gastroesophageal reflux disease)     Heart murmur     Hyperlipidemia     Hypertension     Hypokalemia 2016    Left ventricular hypertrophy     Mixed stress and urge urinary incontinence     Non-rheumatic mitral regurgitation 2023    Osteoarthritis 2016    left knee    Osteopenia     Panlobular emphysema (HCC)     Recurrent UTI     RLS (restless legs syndrome) 2016    Sleep apnea     Sleep apnea 2023     Past Surgical History:   Procedure Laterality Date    CATARACT EXTRACTION Bilateral     CYSTOSCOPY      ILEOSTOMY      NC CYSTOURETHROSCOPY N/A 11/17/2023     Procedure: CYSTOSCOPY  bilateral retrograde pyelograms;  Surgeon: Ryan Saab MD;  Location:  MAIN OR;  Service: Urology    REPLACEMENT TOTAL KNEE BILATERAL      TONSILLECTOMY       History reviewed. No pertinent family history.  Social History   Social History     Substance and Sexual Activity   Alcohol Use Yes    Alcohol/week: 2.0 standard drinks of alcohol    Types: 2 Standard drinks or equivalent per week    Comment: Occasionally     Social History     Substance and Sexual Activity   Drug Use Not Currently     Social History     Tobacco Use   Smoking Status Former    Current packs/day: 1.00    Average packs/day: 1 pack/day for 50.0 years (50.0 ttl pk-yrs)    Types: Cigarettes   Smokeless Tobacco Never   Tobacco Comments    Has not smoked since hospital     E-Cigarette/Vaping    E-Cigarette Use Never User      E-Cigarette/Vaping Substances    Nicotine No     THC No     CBD No     Flavoring No     Other No     Unknown No        Meds/Allergies     Current Outpatient Medications:     celecoxib (CeleBREX) 200 mg capsule, Take 1 capsule (200 mg total) by mouth daily, Disp: 90 capsule, Rfl: 3    Cholecalciferol 1.25 MG (08963 UT) capsule, Take 1 capsule by mouth once a week, Disp: , Rfl:     ipratropium (ATROVENT) 0.02 % nebulizer solution, Take 2.5 mL (0.5 mg total) by nebulization every 6 (six) hours as needed for wheezing or shortness of breath, Disp: 60 mL, Rfl: 0    ipratropium-albuterol (DUO-NEB) 0.5-2.5 mg/3 mL nebulizer solution, Inhale 3 mL 4 (four) times a day as needed, Disp: , Rfl:     levalbuterol (Xopenex HFA) 45 mcg/act inhaler, Inhale 1-2 puffs every 4 (four) hours as needed for wheezing, Disp: 15 g, Rfl: 3    levalbuterol (Xopenex) 0.63 mg/3 mL nebulizer solution, Take 3 mL (0.63 mg total) by nebulization every 6 (six) hours as needed for wheezing or shortness of breath, Disp: 75 mL, Rfl: 0    metoprolol succinate (Toprol XL) 25 mg 24 hr tablet, Take 25 mg by mouth daily, Disp: , Rfl:      "omeprazole (PriLOSEC) 20 mg delayed release capsule, 20 mg daily, Disp: , Rfl:     patient supplied medication, Take 1 each by mouth in the morning UQORA, Disp: , Rfl:     pramipexole (MIRAPEX) 0.5 mg tablet, Take 1 tablet by mouth 2 (two) times a day, Disp: , Rfl:     rosuvastatin (CRESTOR) 5 mg tablet, Take 5 mg by mouth daily, Disp: , Rfl:     sertraline (ZOLOFT) 100 mg tablet, Take 100 mg by mouth daily, Disp: , Rfl:     telmisartan-hydrochlorothiazide (MICARDIS HCT) 80-25 MG per tablet, Take 1 tablet by mouth daily, Disp: , Rfl:     Trelegy Ellipta 200-62.5-25 MCG/ACT AEPB inhaler, Inhale 1 puff daily, Disp: 180 blister, Rfl: 3    guaiFENesin (MUCINEX) 600 mg 12 hr tablet, Take 1 tablet (600 mg total) by mouth every 12 (twelve) hours (Patient not taking: Reported on 1/21/2025), Disp: 60 tablet, Rfl: 0  Allergies   Allergen Reactions    Penicillins Hives    Morphine GI Intolerance     n, v       Vitals: Blood pressure 120/74, pulse 75, temperature (!) 97.2 °F (36.2 °C), temperature source Temporal, height 5' 4\" (1.626 m), weight 94.3 kg (208 lb), SpO2 96%. Body mass index is 35.7 kg/m². Oxygen Therapy  SpO2: 96 % (3 liters)      Physical Exam  Vitals reviewed.   Constitutional:       Appearance: Normal appearance.   HENT:      Head: Normocephalic.      Nose: Nose normal.      Mouth/Throat:      Mouth: Mucous membranes are moist.      Pharynx: Oropharynx is clear.   Cardiovascular:      Rate and Rhythm: Normal rate and regular rhythm.      Pulses: Normal pulses.      Heart sounds: Normal heart sounds.   Pulmonary:      Effort: Pulmonary effort is normal.      Breath sounds: Normal breath sounds.   Musculoskeletal:         General: Normal range of motion.   Skin:     General: Skin is warm.      Capillary Refill: Capillary refill takes less than 2 seconds.   Neurological:      General: No focal deficit present.      Mental Status: She is alert and oriented to person, place, and time.   Psychiatric:         Mood and " Affect: Mood normal.         Behavior: Behavior normal.         Labs:   Component      Latest Ref Rng 12/17/2024   GLUCOSE      65 - 99 mg/dL 122 (H) (E)   BUN      7 - 25 mg/dL 23 (E)   Creatinine      0.40 - 1.10 mg/dL 0.85 (E)   Sodium      135 - 145 mmol/L 142 (E)   Potassium      3.5 - 5.2 mmol/L 4.2 (E)   Chloride      100 - 109 mmol/L 101 (E)   Carbon Dioxide      21 - 31 mmol/L 33 (H) (E)   Calcium      8.5 - 10.5 mg/dL 9.2 (E)   ALK PHOS      35 - 120 U/L 74 (E)   Albumin      3.5 - 5.7 g/dL 3.9 (E)   Total Bilirubin      0.2 - 1.0 mg/dL 0.6 (E)   Total Protein      6.3 - 8.3 g/dL 5.9 (L) (E)   AST      <41 U/L 20 (E)   ALT      <56 U/L 33 (E)   ANION GAP      3 - 11  8 (E)   GFR, Calculated      >59  72 (E)   eGFR Comment Interpretive information: calculated GFR (E)   CBC AND DIFFERENTIAL  Order: [id number]  Component  Ref Range & Units 12/17/24  1:38 PM   Hemoglobin  11.5 - 14.5 g/dL 12.3   Hematocrit  35.0 - 43.0 % 33.9 Low    WBC  4.0 - 10.0 thou/cmm 9.8   RBC  3.70 - 4.70 mill/cmm 3.52 Low    Platelet  140 - 350 thou/cmm 195   MPV  7.5 - 11.3 fL 7.8   MCV  80 - 100 fL 96   MCH  26.0 - 34.0 pg 34.9 High    MCHC  32.0 - 37.0 g/dL 36.2   RDW  12.0 - 16.0 % 14.1   Differential Type AUTO   Absolute Neutrophils  1.8 - 7.8 thou/cmm 7.2   Absolute Lymphocytes  1.0 - 3.0 thou/cmm 1.8   Absolute Monocytes  0.3 - 1.0 thou/cmm 0.6   Absolute Eosinophils  0.0 - 0.5 thou/cmm 0.1   Absolute Basophils  0.0 - 0.1 thou/cmm 0.1   Neutrophils  % 74   Lymphocytes Manual  % 18   Monocytes  % 6   Eosinophils  % 1   Basophils  % 1      Legend:  (H) High  (L) Low  (E) External lab result    Imaging and other studies: Results Review Statement: I reviewed radiology reports from this admission including: CT chest and CT abdomen/pelvis.  CT PE study w abdomen pelvis w wo contrast 12/1/24  New bilateral pulmonary infiltrates likely reflecting multifocal pneumonia.  Emphysema.  No pulmonary embolism identified.  No acute  "intra-abdominal pathology.  Hepatomegaly.  Chronic findings, as per the body of the report.      MELONIE Ortiz  [facility]        Portions of the record may have been created with voice recognition software.  Occasional wrong word or \"sound a like\" substitutions may have occurred due to the inherent limitations of voice recognition software.  Read the chart carefully and recognize, using context, where substitutions have occurred or contact the dictating provider.  "

## 2025-01-21 NOTE — ASSESSMENT & PLAN NOTE
Mild obstruction on PFT with FEV1 70% predicted. Emphysema noted on radiographic studies.   Continue Trelegy daily  Nebs with Xopenex/ipratropium up to 4 times per day if needed

## 2025-01-21 NOTE — TELEPHONE ENCOUNTER
Called and left message for pt to see if she wanted to come in any sooner as we did have a cancel at 2:00 today

## 2025-01-21 NOTE — ASSESSMENT & PLAN NOTE
Most recent PFT results more consistent with obstruction rather than restriction.  She has atypical pANCA but no prior history of ulcerative colitis, sclerosing cholangitis or hepatitis. Remainder of labs are unremarkable.    More than likely the fibrosis seen in CT imaging is CPFE

## 2025-01-21 NOTE — ASSESSMENT & PLAN NOTE
Recent hospital discharge where 4L/m with ambulation was prescribed; she has weaned herself down to 3L/m.    Patient may opt to keep at home O2 at 3L/m during the day to help improve air flow - she has a long tube and 2L/m not strong enough to push to the end.    Continue 2L/m bled in to CPAP

## 2025-01-22 ENCOUNTER — TELEPHONE (OUTPATIENT)
Age: 75
End: 2025-01-22

## 2025-01-22 NOTE — TELEPHONE ENCOUNTER
Chris from [facility] called in regards to the patient.  He stated he will need the office visit notes from the patients appointment yesterday 1/21/25 faxed over to their office.  They need the notes in order to renew the patients home oxygen.  Chris gave me the fax number [phone]

## 2025-01-27 ENCOUNTER — HOSPITAL ENCOUNTER (OUTPATIENT)
Dept: CT IMAGING | Facility: HOSPITAL | Age: 75
Discharge: HOME | End: 2025-01-27
Payer: MEDICARE

## 2025-01-27 DIAGNOSIS — J18.9 COMMUNITY ACQUIRED PNEUMONIA OF LEFT LOWER LOBE OF LUNG: ICD-10-CM

## 2025-01-27 PROCEDURE — 71250 CT THORAX DX C-: CPT

## 2025-01-27 NOTE — TELEPHONE ENCOUNTER
Cari from [employer] is calling as they have no received the office visit notes from the patient's visit on 1/21. Cari gave another fax number: [phone].    Please advise.

## (undated) DEVICE — CATH URETERAL 5FR X 70 CM FLEX TIP POLYUR BARD

## (undated) DEVICE — SCD SEQUENTIAL COMPRESSION COMFORT SLEEVE MEDIUM KNEE LENGTH: Brand: KENDALL SCD

## (undated) DEVICE — PACK TUR

## (undated) DEVICE — GLOVE SRG BIOGEL 7.5

## (undated) DEVICE — DISPOSABLE OR TOWEL: Brand: CARDINAL HEALTH

## (undated) DEVICE — TELFA NON-ADHERENT ABSORBENT DRESSING: Brand: TELFA

## (undated) DEVICE — CHLORHEXIDINE 4PCT 4 OZ

## (undated) DEVICE — STERILE SURGICAL LUBRICANT,  TUBE: Brand: SURGILUBE

## (undated) DEVICE — URO CATCHER BAG STERILE 0-UC32

## (undated) DEVICE — GUIDEWIRE STRGHT TIP 0.035 IN  SOLO PLUS

## (undated) DEVICE — PREMIUM DRY TRAY LF: Brand: MEDLINE INDUSTRIES, INC.

## (undated) DEVICE — TUBING SUCTION 5MM X 12 FT

## (undated) DEVICE — CYSTO TUBING TUR Y IRRIGATION